# Patient Record
Sex: FEMALE | Race: WHITE | NOT HISPANIC OR LATINO | Employment: OTHER | URBAN - METROPOLITAN AREA
[De-identification: names, ages, dates, MRNs, and addresses within clinical notes are randomized per-mention and may not be internally consistent; named-entity substitution may affect disease eponyms.]

---

## 2017-01-03 ENCOUNTER — TELEPHONE (OUTPATIENT)
Dept: INTERNAL MEDICINE | Facility: CLINIC | Age: 82
End: 2017-01-03

## 2017-01-03 NOTE — TELEPHONE ENCOUNTER
Discussed with her. Recently hospitalized for anemia and non-ST elevation MI.  She will keep her appointment scheduled January 24.  She has cardiology follow-up this week.

## 2017-01-03 NOTE — TELEPHONE ENCOUNTER
----- Message from Epifanio Betancur sent at 1/3/2017  1:05 PM EST -----  Contact: pt  Pt calling would like a call back. Pt has been admitted to [facility]. She says the doctors instructed her to call Dr. Casillas. Please advise.     #[phone]

## 2017-01-05 ENCOUNTER — TELEPHONE (OUTPATIENT)
Dept: CARDIOLOGY | Facility: CLINIC | Age: 82
End: 2017-01-05

## 2017-01-05 ENCOUNTER — LAB (OUTPATIENT)
Dept: LAB | Facility: HOSPITAL | Age: 82
End: 2017-01-05

## 2017-01-05 ENCOUNTER — OFFICE VISIT (OUTPATIENT)
Dept: CARDIOLOGY | Facility: CLINIC | Age: 82
End: 2017-01-05

## 2017-01-05 VITALS
SYSTOLIC BLOOD PRESSURE: 130 MMHG | WEIGHT: 164 LBS | HEART RATE: 74 BPM | HEIGHT: 63 IN | BODY MASS INDEX: 29.06 KG/M2 | DIASTOLIC BLOOD PRESSURE: 72 MMHG

## 2017-01-05 DIAGNOSIS — Z87.19 H/O: GI BLEED: ICD-10-CM

## 2017-01-05 DIAGNOSIS — I21.4 NSTEMI (NON-ST ELEVATED MYOCARDIAL INFARCTION) (HCC): ICD-10-CM

## 2017-01-05 DIAGNOSIS — I10 ESSENTIAL HYPERTENSION: Primary | ICD-10-CM

## 2017-01-05 DIAGNOSIS — D50.0 IRON DEFICIENCY ANEMIA DUE TO CHRONIC BLOOD LOSS: ICD-10-CM

## 2017-01-05 LAB
ANISOCYTOSIS BLD QL: NORMAL
BASOPHILS # BLD AUTO: 0.03 10*3/MM3 (ref 0–0.2)
BASOPHILS NFR BLD AUTO: 0.7 % (ref 0–1.5)
C3 FRG RBC-MCNC: NORMAL
DACRYOCYTES BLD QL SMEAR: NORMAL
DEPRECATED RDW RBC AUTO: 76.8 FL (ref 37–54)
EOSINOPHIL # BLD AUTO: 0.08 10*3/MM3 (ref 0–0.7)
EOSINOPHIL NFR BLD AUTO: 1.8 % (ref 0.3–6.2)
ERYTHROCYTE [DISTWIDTH] IN BLOOD BY AUTOMATED COUNT: 29.2 % (ref 11.7–13)
HCT VFR BLD AUTO: 41.2 % (ref 35.6–45.5)
HGB BLD-MCNC: 12.5 G/DL (ref 11.9–15.5)
IMM GRANULOCYTES # BLD: 0 10*3/MM3 (ref 0–0.03)
IMM GRANULOCYTES NFR BLD: 0 % (ref 0–0.5)
LYMPHOCYTES # BLD AUTO: 0.85 10*3/MM3 (ref 0.9–4.8)
LYMPHOCYTES NFR BLD AUTO: 18.6 % (ref 19.6–45.3)
MCH RBC QN AUTO: 23.3 PG (ref 26.9–32)
MCHC RBC AUTO-ENTMCNC: 30.3 G/DL (ref 32.4–36.3)
MCV RBC AUTO: 76.7 FL (ref 80.5–98.2)
MONOCYTES # BLD AUTO: 0.39 10*3/MM3 (ref 0.2–1.2)
MONOCYTES NFR BLD AUTO: 8.6 % (ref 5–12)
NEUTROPHILS # BLD AUTO: 3.21 10*3/MM3 (ref 1.9–8.1)
NEUTROPHILS NFR BLD AUTO: 70.3 % (ref 42.7–76)
OVALOCYTES BLD QL SMEAR: NORMAL
PLAT MORPH BLD: NORMAL
PLATELET # BLD AUTO: 88 10*3/MM3 (ref 140–500)
POIKILOCYTOSIS BLD QL SMEAR: NORMAL
RBC # BLD AUTO: 5.37 10*6/MM3 (ref 3.9–5.2)
WBC MORPH BLD: NORMAL
WBC NRBC COR # BLD: 4.56 10*3/MM3 (ref 4.5–10.7)

## 2017-01-05 PROCEDURE — 93000 ELECTROCARDIOGRAM COMPLETE: CPT | Performed by: PHYSICIAN ASSISTANT

## 2017-01-05 PROCEDURE — 85025 COMPLETE CBC W/AUTO DIFF WBC: CPT

## 2017-01-05 PROCEDURE — 36415 COLL VENOUS BLD VENIPUNCTURE: CPT

## 2017-01-05 PROCEDURE — 85007 BL SMEAR W/DIFF WBC COUNT: CPT

## 2017-01-05 PROCEDURE — 99214 OFFICE O/P EST MOD 30 MIN: CPT | Performed by: PHYSICIAN ASSISTANT

## 2017-01-05 NOTE — PROGRESS NOTES
Date of Office Visit: 2017  Encounter Provider: MIKE Guerrero  Place of Service: [facility] CARDIOLOGY  Patient Name: Pamela Durham  :1935    Chief Complaint   Patient presents with   • Shortness of Breath     1 week hospital f/u   :     HPI: Pamela Durham is a 81 y.o. female , new to me, who presents today for follow-up.  Old records have been obtained and reviewed by me.  She is a patient with a past medical history significant for cholelithiasis, osteoarthritis, chronic anemia, and hypertension who presented to the emergency room on 2016 with complaints of increased fatigue and dyspnea on exertion ×1 week.  As part of her workup, she was found to have elevated cardiac biomarkers that were consistent with a non-STEMI.  She was also found to have bilateral pleural effusions and microcytic anemia.  Because of her anemia and the fact that she reported bright red blood in her stool, a GI consult was obtained.  Dr. Earl did evaluate the patient for her non-STEMI.  Because there was concern for her bleeding in her intestines, she was not taken to the cardiac Cath Lab.  She ultimately received a lower and upper endoscopy, and was found to have gastric ulcers that were superficial and colonic angiodysplasias that were treated successfully.  For her non-STEMI, she was treated with a beta blocker, aspirin, and a statin.  She was also diuresed, which helped her pleural effusion.  Recommendations were to follow-up with cardiology for consideration of a left heart catheterization in the future when dual antiplatelet therapy would be an option.  She was discharged home in stable condition on 2016.   Since she's been home, she's much improved.  She denies chest pain, shortness of breath, palpitations, edema, dizziness, or syncope.  She is much less fatigued.  She has not had any lab work rechecked since she left the hospital.  She has been weighing herself daily.   On her scale at home, she's lost a total of about 10 pounds.      Past Medical History   Diagnosis Date   • Gallbladder stone without cholecystitis or obstruction      gallstone seen on ultrasound 2007   • Hypertension    • Osteoarthritis        Past Surgical History   Procedure Laterality Date   • Knee surgery Bilateral      2007 & 2008   • Hysterectomy  1986   • Tonsillectomy     • Lung surgery  1965   • Hip arthroplasty Right 04/01/2015   • Endoscopy N/A 12/27/2016     Procedure: ESOPHAGOGASTRODUODENOSCOPY WITH COLD BIOPSIES;  Surgeon: Javier Peñaloza MD;  Location: [facility] ENDOSCOPY;  Service:    • Colonoscopy N/A 12/27/2016     Procedure: COLONOSCOPY INTO CECUM WITH ARGON PLASMA COAGULATION;  Surgeon: Javier Peñaloza MD;  Location: [facility] ENDOSCOPY;  Service:        Social History     Social History   • Marital status:      Spouse name: N/A   • Number of children: N/A   • Years of education: N/A     Occupational History   • Not on file.     Social History Main Topics   • Smoking status: Never Smoker   • Smokeless tobacco: Not on file   • Alcohol use No   • Drug use: Defer   • Sexual activity: Defer     Other Topics Concern   • Not on file     Social History Narrative       Family History   Problem Relation Age of Onset   • Hypertension Other    • Heart disease Other    • Liver cancer Brother    • Ovarian cancer Daughter      diagnosed 2012   • No Known Problems Mother    • Heart disease Father    • Heart attack Father    • Cancer Maternal Grandmother    • No Known Problems Maternal Grandfather    • No Known Problems Paternal Grandmother    • No Known Problems Paternal Grandfather        Review of Systems   Constitution: Negative for chills, fever, malaise/fatigue, weight gain and weight loss.   HENT: Negative for ear pain, headaches, hearing loss, nosebleeds and sore throat.    Eyes: Negative for double vision, pain and visual disturbance.   Cardiovascular: Negative for chest pain, dyspnea on exertion,  irregular heartbeat, leg swelling, near-syncope, orthopnea, palpitations, paroxysmal nocturnal dyspnea and syncope.   Respiratory: Negative for cough, shortness of breath, sleep disturbances due to breathing, snoring and wheezing.    Endocrine: Negative for cold intolerance, heat intolerance and polyuria.   Skin: Negative for itching and rash.   Musculoskeletal: Negative for joint pain, joint swelling and myalgias.   Gastrointestinal: Negative for abdominal pain, diarrhea, melena, nausea and vomiting.   Genitourinary: Negative for frequency, hematuria and hesitancy.   Neurological: Negative for excessive daytime sleepiness, light-headedness, numbness, paresthesias and seizures.   Psychiatric/Behavioral: Negative for altered mental status and depression.   Allergic/Immunologic: Negative.    All other systems reviewed and are negative.      No Known Allergies      Current Outpatient Prescriptions:   •  acetaminophen (TYLENOL) 325 MG tablet, Take 2 tablets by mouth Every 4 (Four) Hours As Needed for mild pain (1-3)., Disp: , Rfl: 0  •  aspirin 81 MG EC tablet, Take 1 tablet by mouth Daily., Disp: 90 tablet, Rfl: 0  •  atorvastatin (LIPITOR) 80 MG tablet, Take 1 tablet by mouth Every Night., Disp: 30 tablet, Rfl: 0  •  BIOTIN PO, Take  by mouth daily., Disp: , Rfl:   •  CALCIUM PO, Take  by mouth 2 (two) times a day., Disp: , Rfl:   •  carvedilol (COREG) 3.125 MG tablet, Take 1 tablet by mouth Every 12 (Twelve) Hours., Disp: 60 tablet, Rfl: 0  •  Cholecalciferol (VITAMIN D-3 PO), Take  by mouth., Disp: , Rfl:   •  docusate sodium 100 MG capsule, Take 100 mg by mouth 2 (Two) Times a Day., Disp: , Rfl: 0  •  felodipine (PLENDIL) 2.5 MG 24 hr tablet, Take  by mouth., Disp: , Rfl:   •  ferrous sulfate 325 (65 FE) MG tablet, Take 1 tablet by mouth 2 (Two) Times a Day With Meals., Disp: 60 tablet, Rfl: 1  •  furosemide (LASIX) 40 MG tablet, Take 1 tablet by mouth Daily., Disp: 30 tablet, Rfl: 0  •  HYDROcodone-acetaminophen  "(NORCO) 7.5-325 MG per tablet, Take 1 tablet by mouth Every 6 (Six) Hours As Needed for moderate pain (4-6)., Disp: 60 tablet, Rfl: 0  •  Multiple Vitamins-Minerals (CENTRUM SILVER) tablet, Take 1 tablet by mouth Daily., Disp: , Rfl:   •  pantoprazole (PROTONIX) 40 MG EC tablet, Take 1 tablet by mouth Daily., Disp: 90 tablet, Rfl: 0  •  potassium chloride (K-DUR,KLOR-CON) 10 MEQ CR tablet, Take 1 tablet by mouth Daily., Disp: 30 tablet, Rfl: 0  •  PROAIR  (90 BASE) MCG/ACT inhaler, INHALE 2 PUFFS QID, Disp: , Rfl: 0  •  QVAR 80 MCG/ACT inhaler, INHALE 2 PUFFS BID, Disp: , Rfl: 0     Objective:     Vitals:    01/05/17 0922 01/05/17 0927   BP: 134/84 130/72   BP Location: Right arm Left arm   Pulse: 74    Weight: 164 lb (74.4 kg)    Height: 63\" (160 cm)      Body mass index is 29.05 kg/(m^2).    PHYSICAL EXAM:    Physical Exam   Constitutional: She is oriented to person, place, and time. She appears well-developed and well-nourished. No distress.   HENT:   Head: Normocephalic and atraumatic.   Eyes: Pupils are equal, round, and reactive to light.   Neck: No JVD present. No thyromegaly present.   Cardiovascular: Normal rate, regular rhythm, normal heart sounds and intact distal pulses.    No murmur heard.  Pulmonary/Chest: Effort normal and breath sounds normal. No respiratory distress.   Abdominal: Soft. Bowel sounds are normal. She exhibits no distension. There is no splenomegaly or hepatomegaly. There is no tenderness.   Musculoskeletal: Normal range of motion. She exhibits no edema.   Neurological: She is alert and oriented to person, place, and time.   Skin: Skin is warm and dry. She is not diaphoretic. No erythema.   Psychiatric: She has a normal mood and affect. Her behavior is normal. Judgment normal.         ECG 12 Lead  Date/Time: 1/5/2017 9:39 AM  Performed by: KACIE SEGAL.  Authorized by: KACIE SEGAL   Comparison: compared with previous ECG from 12/25/2016  Similar to previous ECG  Rhythm: " sinus rhythm  BPM: 74  T flattening: V1, V3, V4, V5, V6 and aVL  Clinical impression: abnormal ECG  Comments: Indication: Non-STEMI.              Assessment:       Diagnosis Plan   1. Essential hypertension     2. NSTEMI (non-ST elevated myocardial infarction)  ECG 12 Lead   3. Iron deficiency anemia due to chronic blood loss  CBC & Differential   4. H/O: GI bleed  CBC & Differential     Orders Placed This Encounter   Procedures   • ECG 12 Lead     This order was created via procedure documentation          Plan:       1.  Hypertension.  Her blood pressure is well-controlled today at 130/72.  Continue current medical regimen.    2.  Non-STEMI.  She has had no chest pain.  She was less fatigued and feeling better.  She denies any shortness of breath.  I did discuss this with Dr. Earl.  I'm going to have the patient follow back up with him in 3 weeks.  At that time, discussion will be had about whether or not to take the patient for a left heart catheterization.  She currently has no symptoms of angina or heart failure.    3.  Iron deficiency anemia/history of GI bleed.  I think that she is improved from the standpoint as well.  She will follow-up with Dr. Peñaloza in 3 months.  I'm going to check a CBC today to assess her anemia.  Further recommendations will be made pending the results of that test.    She will follow-up with Dr. Earl in 3 weeks.    As always, it has been a pleasure to participate in your patient's care.      Sincerely,         Amelia Perez PA-C

## 2017-01-05 NOTE — TELEPHONE ENCOUNTER
The patient  called and would like to know if you can please call him back with the patient RBC results. He can be reached at [phone]        Thanks  Louis

## 2017-01-05 NOTE — MR AVS SNAPSHOT
Pamela Durham   1/5/2017 8:40 AM   Office Visit    Dept Phone:  [phone]   Encounter #:  [id number]    Provider:  MIKE Guerrero   Department:  [facility] CARDIOLOGY                Your Full Care Plan              Your Updated Medication List          This list is accurate as of: 1/5/17  9:55 AM.  Always use your most recent med list.                acetaminophen 325 MG tablet   Commonly known as:  TYLENOL   Take 2 tablets by mouth Every 4 (Four) Hours As Needed for mild pain (1-3).       aspirin 81 MG EC tablet   Take 1 tablet by mouth Daily.       atorvastatin 80 MG tablet   Commonly known as:  LIPITOR   Take 1 tablet by mouth Every Night.       BIOTIN PO       CALCIUM PO       carvedilol 3.125 MG tablet   Commonly known as:  COREG   Take 1 tablet by mouth Every 12 (Twelve) Hours.       CENTRUM SILVER tablet        MG capsule   Take 100 mg by mouth 2 (Two) Times a Day.       felodipine 2.5 MG 24 hr tablet   Commonly known as:  PLENDIL       ferrous sulfate 325 (65 FE) MG tablet   Take 1 tablet by mouth 2 (Two) Times a Day With Meals.       furosemide 40 MG tablet   Commonly known as:  LASIX   Take 1 tablet by mouth Daily.       HYDROcodone-acetaminophen 7.5-325 MG per tablet   Commonly known as:  NORCO   Take 1 tablet by mouth Every 6 (Six) Hours As Needed for moderate pain (4-6).       pantoprazole 40 MG EC tablet   Commonly known as:  PROTONIX   Take 1 tablet by mouth Daily.       potassium chloride 10 MEQ CR tablet   Commonly known as:  K-DUR,KLOR-CON   Take 1 tablet by mouth Daily.       PROAIR  (90 BASE) MCG/ACT inhaler   Generic drug:  albuterol       QVAR 80 MCG/ACT inhaler   Generic drug:  beclomethasone       VITAMIN D-3 PO               We Performed the Following     ECG 12 Lead       You Were Diagnosed With        Codes Comments    Essential hypertension    -  Primary ICD-10-CM: I10  ICD-9-CM: 401.9     NSTEMI (non-ST elevated  myocardial infarction)     ICD-10-CM: I21.4  ICD-9-CM: 410.70     Iron deficiency anemia due to chronic blood loss     ICD-10-CM: D50.0  ICD-9-CM: 280.0     H/O: GI bleed     ICD-10-CM: Z87.19  ICD-9-CM: V12.79       Instructions     None    Patient Instructions History      Upcoming Appointments     Visit Type Date Time Department    HOSPITAL FOLLOW UP 1/5/2017  8:40 AM [facility]    FOLLOW UP 1/24/2017  8:45 AM [facility]    FOLLOW UP 1/26/2017  9:40 AM [facility]      MyChart Signup     Our records indicate that you have an active [email or web address] account.    You can view your After Visit Summary by going to [employer] and logging in with your [employer] username and password.  If you don't have a [employer] username and password but a parent or guardian has access to your record, the parent or guardian should login with their own [employer] username and password and access your record to view the After Visit Summary.    If you have questions, you can email [email or web address] or call [phone] to talk to our [employer] staff.  Remember, [employer] is NOT to be used for urgent needs.  For medical emergencies, dial 911.               Other Info from Your Visit           Your Appointments     Jan 24, 2017  8:45 AM EST   Follow Up with Crista Casillas MD   [facility] INTERNAL MEDICINE (--)    [address]  [facility] [address] [phone]           Arrive 15 minutes prior to appointment.            Jan 26, 2017  9:40 AM EST   Follow Up with Desean Earl MD   [facility] CARDIOLOGY (--)    [address]  [facility] [address] [phone]           Arrive 15 minutes prior to appointment.              Allergies     No Known Allergies      Reason for Visit     Shortness of Breath 1 week hospital f/u      Vital Signs     Blood Pressure Pulse Height Weight Body Mass Index Smoking Status    130/72 (BP  "Location: Left arm) 74 63\" (160 cm) 164 lb (74.4 kg) 29.05 kg/m2 Never Smoker      Problems and Diagnoses Noted     High blood pressure    Iron deficiency anemia    Heart attack    H/O: GI bleed          Results     ECG 12 Lead               "

## 2017-01-05 NOTE — TELEPHONE ENCOUNTER
I called the patient and informed her of the results of her CBC.  Her hemoglobin and hematocrit are now within normal limits.  She indicated that she understood.

## 2017-01-18 RX ORDER — FELODIPINE 2.5 MG/1
2.5 TABLET, EXTENDED RELEASE ORAL DAILY
Qty: 90 TABLET | Refills: 1 | Status: SHIPPED | OUTPATIENT
Start: 2017-01-18 | End: 2017-02-28

## 2017-01-24 ENCOUNTER — OFFICE VISIT (OUTPATIENT)
Dept: INTERNAL MEDICINE | Facility: CLINIC | Age: 82
End: 2017-01-24

## 2017-01-24 VITALS
WEIGHT: 155 LBS | DIASTOLIC BLOOD PRESSURE: 72 MMHG | BODY MASS INDEX: 27.46 KG/M2 | SYSTOLIC BLOOD PRESSURE: 120 MMHG | HEIGHT: 63 IN

## 2017-01-24 DIAGNOSIS — I10 ESSENTIAL HYPERTENSION: Primary | ICD-10-CM

## 2017-01-24 DIAGNOSIS — D69.6 THROMBOCYTOPENIA (HCC): ICD-10-CM

## 2017-01-24 DIAGNOSIS — E78.5 HYPERLIPIDEMIA, UNSPECIFIED HYPERLIPIDEMIA TYPE: ICD-10-CM

## 2017-01-24 DIAGNOSIS — R53.1 GENERAL WEAKNESS: ICD-10-CM

## 2017-01-24 LAB
ALBUMIN SERPL-MCNC: 3.99 G/DL (ref 3.4–4.6)
ALBUMIN/GLOB SERPL: 1.2 G/DL
ALP SERPL-CCNC: 126 U/L (ref 46–116)
ALT SERPL W P-5'-P-CCNC: 44 U/L (ref 14–59)
ANION GAP SERPL CALCULATED.3IONS-SCNC: 8 MMOL/L
AST SERPL-CCNC: 53 U/L (ref 7–37)
BASOPHILS # BLD AUTO: 0.04 10*3/MM3 (ref 0–0.2)
BASOPHILS NFR BLD AUTO: 0.8 % (ref 0–1.5)
BILIRUB SERPL-MCNC: 0.9 MG/DL (ref 0.2–1)
BUN BLD-MCNC: 16 MG/DL (ref 6–22)
BUN/CREAT SERPL: 12 (ref 7–25)
CALCIUM SPEC-SCNC: 9.7 MG/DL (ref 8.6–10.5)
CHLORIDE SERPL-SCNC: 104 MMOL/L (ref 95–107)
CHOLEST SERPL-MCNC: 125 MG/DL (ref 0–200)
CO2 SERPL-SCNC: 30 MMOL/L (ref 23–32)
CREAT BLD-MCNC: 1.33 MG/DL (ref 0.55–1.02)
DIFFERENTIAL COMMENT: NORMAL
EOSINOPHIL # BLD AUTO: 0.22 10*3/MM3 (ref 0–0.7)
EOSINOPHIL # BLD AUTO: 4.6 % (ref 0.3–6.2)
GFR SERPL CREATININE-BSD FRML MDRD: 38 ML/MIN/1.73
GLOBULIN UR ELPH-MCNC: 3.2 GM/DL
GLUCOSE BLD-MCNC: 154 MG/DL (ref 70–100)
HCT VFR BLD AUTO: 45.2 % (ref 35.6–45.5)
HDLC SERPL-MCNC: 42 MG/DL (ref 40–81)
HGB BLD-MCNC: 13.9 G/DL (ref 11.9–15.5)
IMM GRANULOCYTES # BLD: 0 10*3/MM3 (ref 0–0.03)
IMM GRANULOCYTES NFR BLD: 0 % (ref 0–0.5)
LABORATORY COMMENT REPORT: NORMAL
LDLC SERPL CALC-MCNC: 60 MG/DL (ref 0–100)
LDLC/HDLC SERPL: 1.42 {RATIO}
LYMPHOCYTES # BLD AUTO: 1.19 10*3/MM3 (ref 0.9–4.8)
LYMPHOCYTES NFR BLD AUTO: 25.1 % (ref 19.6–45.3)
MCH RBC QN AUTO: 24.6 PG (ref 26.9–32)
MCHC RBC AUTO-ENTMCNC: 30.8 G/DL (ref 32.4–36.3)
MCV RBC AUTO: 80.1 FL (ref 80.5–98.2)
MONOCYTES # BLD AUTO: 0.27 10*3/MM3 (ref 0.2–1.2)
MONOCYTES NFR BLD AUTO: 5.7 % (ref 5–12)
NEUTROPHILS # BLD AUTO: 3.02 10*3/MM3 (ref 1.9–8.1)
NEUTROPHILS NFR BLD AUTO: 63.8 % (ref 42.7–76)
NRBC BLD AUTO-RTO: 0 /100 WBC (ref 0–0)
PLATELET # BLD AUTO: 80 10*3/MM3 (ref 140–500)
PLT COMMENT: NORMAL
POTASSIUM BLD-SCNC: 4.7 MMOL/L (ref 3.3–5.3)
PROT SERPL-MCNC: 7.2 G/DL (ref 6.3–8.4)
RBC # BLD AUTO: 5.64 10*6/MM3 (ref 3.9–5.2)
SODIUM BLD-SCNC: 142 MMOL/L (ref 136–145)
TRIGL SERPL-MCNC: 116 MG/DL (ref 0–150)
VLDLC SERPL-MCNC: 23.2 MG/DL
WBC # BLD AUTO: 4.74 10*3/MM3 (ref 4.5–10.7)

## 2017-01-24 PROCEDURE — 99214 OFFICE O/P EST MOD 30 MIN: CPT | Performed by: INTERNAL MEDICINE

## 2017-01-24 PROCEDURE — 80061 LIPID PANEL: CPT | Performed by: INTERNAL MEDICINE

## 2017-01-24 PROCEDURE — 80053 COMPREHEN METABOLIC PANEL: CPT | Performed by: INTERNAL MEDICINE

## 2017-01-24 NOTE — MR AVS SNAPSHOT
Pamela Durham   1/24/2017 8:45 AM   Office Visit    Dept Phone:  [phone]   Encounter #:  [id number]    Provider:  Crista Casillas MD   Department:  [facility] INTERNAL MEDICINE                Your Full Care Plan              Your Updated Medication List          This list is accurate as of: 1/24/17  9:30 AM.  Always use your most recent med list.                acetaminophen 325 MG tablet   Commonly known as:  TYLENOL   Take 2 tablets by mouth Every 4 (Four) Hours As Needed for mild pain (1-3).       aspirin 81 MG EC tablet   Take 1 tablet by mouth Daily.       atorvastatin 80 MG tablet   Commonly known as:  LIPITOR   Take 1 tablet by mouth Every Night.       BIOTIN PO       CALCIUM PO       carvedilol 3.125 MG tablet   Commonly known as:  COREG   Take 1 tablet by mouth Every 12 (Twelve) Hours.       CENTRUM SILVER tablet        MG capsule   Take 100 mg by mouth 2 (Two) Times a Day.       felodipine 2.5 MG 24 hr tablet   Commonly known as:  PLENDIL   Take 1 tablet by mouth Daily.       ferrous sulfate 325 (65 FE) MG tablet   Take 1 tablet by mouth 2 (Two) Times a Day With Meals.       furosemide 40 MG tablet   Commonly known as:  LASIX   Take 1 tablet by mouth Daily.       HYDROcodone-acetaminophen 7.5-325 MG per tablet   Commonly known as:  NORCO   Take 1 tablet by mouth Every 6 (Six) Hours As Needed for moderate pain (4-6).       pantoprazole 40 MG EC tablet   Commonly known as:  PROTONIX   Take 1 tablet by mouth Daily.       potassium chloride 10 MEQ CR tablet   Commonly known as:  K-DUR,KLOR-CON   Take 1 tablet by mouth Daily.       PROAIR  (90 BASE) MCG/ACT inhaler   Generic drug:  albuterol       QVAR 80 MCG/ACT inhaler   Generic drug:  beclomethasone       VITAMIN D-3 PO               We Performed the Following     Ambulatory Referral to Physical Therapy Evaluate and treat       You Were Diagnosed With        Codes Comments    Essential hypertension     -  Primary ICD-10-CM: I10  ICD-9-CM: 401.9     Hyperlipidemia, unspecified hyperlipidemia type     ICD-10-CM: E78.5  ICD-9-CM: 272.4     General weakness     ICD-10-CM: R53.1  ICD-9-CM: 780.79       Instructions     None    Patient Instructions History      Upcoming Appointments     Visit Type Date Time Department    FOLLOW UP 1/24/2017  8:45 AM [facility]    FOLLOW UP 1/26/2017  9:40 AM [facility]    FOLLOW UP 5/31/2017  8:00 AM [facility] Signup     Our records indicate that you have an active [employer] account.    You can view your After Visit Summary by going to [employer] and logging in with your [email or web address] username and password.  If you don't have a [email or web address] username and password but a parent or guardian has access to your record, the parent or guardian should login with their own [email or web address] username and password and access your record to view the After Visit Summary.    If you have questions, you can email [email or web address] or call [phone] to talk to our [email or web address] staff.  Remember, [email or web address] is NOT to be used for urgent needs.  For medical emergencies, dial 911.               Other Info from Your Visit           Your Appointments     Jan 26, 2017  9:40 AM EST   Follow Up with Desean Earl MD   [facility] CARDIOLOGY (--)    [address]  [facility] [address] [phone]           Arrive 15 minutes prior to appointment.            May 31, 2017  8:00 AM EDT   Follow Up with Crista Casillas MD   [facility] INTERNAL MEDICINE (--)    [address]  [facility] [address] [phone]           Arrive 15 minutes prior to appointment.              Allergies     No Known Allergies      Reason for Visit     Hypertension     Hyperlipidemia           Vital Signs     Blood Pressure Height Weight Body Mass Index Smoking Status       120/72 (BP Location: Left arm, Patient  "Position: Sitting, Cuff Size: Adult) 63\" (160 cm) 155 lb (70.3 kg) 27.46 kg/m2 Never Smoker       Problems and Diagnoses Noted     High blood pressure    General weakness    High cholesterol or triglycerides        "

## 2017-01-24 NOTE — PROGRESS NOTES
Chief Complaint   Patient presents with   • Hypertension   • Hyperlipidemia        Subjective   Pamela Durham is a 81 y.o. female     Hypertension   This is a chronic problem. The problem is controlled. Pertinent negatives include no chest pain, palpitations or shortness of breath. There are no associated agents to hypertension. Risk factors for coronary artery disease include dyslipidemia. Past treatments include calcium channel blockers and beta blockers (Current treatment). The current treatment provides significant improvement. There are no compliance problems.  Hypertensive end-organ damage includes CAD/MI.   Hyperlipidemia   This is a chronic problem. The problem is controlled. Recent lipid tests were reviewed and are normal. Factors aggravating her hyperlipidemia include beta blockers. Pertinent negatives include no chest pain, leg pain, myalgias or shortness of breath. Current antihyperlipidemic treatment includes statins. The current treatment provides significant improvement of lipids. There are no compliance problems.    :     Elevated fasting glucose:  She does not have history of diabetes.  However, she has had elevated fasting glucose in the past.  She denies polyuria, polydipsia, vision change appetite change, unexplained weight loss.   Lab Results   Component Value Date    HGBA1C 5.40 12/25/2016      She was recently hospitalized for weakness.  Found to have profound anemia, GI bleed and also had NSTEMI.  She is now home.  She still feels somewhat weak.  She walks with a cane.  Her appetite is fair.  Energy is fair.  She has had follow-up with cardiology and will see cardiology again this week.  She'll see the gastroenterologist in 3 months.  Lab Results   Component Value Date    WBC 4.56 01/05/2017    HGB 12.5 01/05/2017    HCT 41.2 01/05/2017    MCV 76.7 (L) 01/05/2017    PLT 88 (L) 01/05/2017          The following portions of the patient's history were reviewed and updated as appropriate:  "allergies, current medications, past social history, problem list, past surgical history    Review of Systems   Constitutional: Positive for appetite change (appetite is fair). Negative for fatigue.   Respiratory: Negative for cough and shortness of breath.    Cardiovascular: Negative for chest pain, palpitations and leg swelling.   Gastrointestinal: Negative for abdominal pain, constipation, diarrhea, nausea and vomiting.   Endocrine: Negative for cold intolerance and heat intolerance.   Musculoskeletal: Positive for back pain. Negative for myalgias.   Psychiatric/Behavioral: Negative for sleep disturbance.       Visit Vitals   • /72 (BP Location: Left arm, Patient Position: Sitting, Cuff Size: Adult)   • Ht 63\" (160 cm)   • Wt 155 lb (70.3 kg)   • BMI 27.46 kg/m2        Objective   Physical Exam   Constitutional: She is oriented to person, place, and time. She appears well-developed and well-nourished. No distress.   Neck: Normal carotid pulses present. Carotid bruit is not present.   Cardiovascular: Normal rate, regular rhythm, S1 normal, S2 normal and intact distal pulses.  Exam reveals no gallop and no friction rub.    No murmur heard.  Pulses:       Carotid pulses are 2+ on the right side, and 2+ on the left side.  Pulmonary/Chest: Effort normal and breath sounds normal. No respiratory distress. She has no wheezes. She has no rales. Chest wall is not dull to percussion.   Musculoskeletal: She exhibits no edema.   Neurological: She is alert and oriented to person, place, and time.   Skin: Skin is warm and dry.   Nursing note and vitals reviewed.      Assessment/Plan   Problem List Items Addressed This Visit        Cardiovascular and Mediastinum    Essential hypertension - Primary    Relevant Orders    Comprehensive Metabolic Panel    Lipid Panel    CBC & Differential    Hyperlipemia       Other    General weakness    Relevant Orders    Ambulatory Referral to Physical Therapy Evaluate and treat (Completed) "      Other Visit Diagnoses     Thrombocytopenia        Relevant Orders    CBC & Differential        CBC done earlier this month shows improving hemoglobin but low platelet count.

## 2017-01-26 ENCOUNTER — OFFICE VISIT (OUTPATIENT)
Dept: CARDIOLOGY | Facility: CLINIC | Age: 82
End: 2017-01-26

## 2017-01-26 VITALS
HEIGHT: 63 IN | SYSTOLIC BLOOD PRESSURE: 122 MMHG | WEIGHT: 157 LBS | HEART RATE: 63 BPM | DIASTOLIC BLOOD PRESSURE: 60 MMHG | BODY MASS INDEX: 27.82 KG/M2

## 2017-01-26 DIAGNOSIS — I21.4 NSTEMI (NON-ST ELEVATED MYOCARDIAL INFARCTION) (HCC): ICD-10-CM

## 2017-01-26 DIAGNOSIS — I10 ESSENTIAL HYPERTENSION: ICD-10-CM

## 2017-01-26 PROCEDURE — 99214 OFFICE O/P EST MOD 30 MIN: CPT | Performed by: INTERNAL MEDICINE

## 2017-01-26 PROCEDURE — 93000 ELECTROCARDIOGRAM COMPLETE: CPT | Performed by: INTERNAL MEDICINE

## 2017-01-26 NOTE — MR AVS SNAPSHOT
Pamela Durham   1/26/2017 9:40 AM   Office Visit    Dept Phone:  [phone]   Encounter #:  [id number]    Provider:  Desean Earl MD   Department:  [facility] CARDIOLOGY                Your Full Care Plan              Your Updated Medication List          This list is accurate as of: 1/26/17 10:22 AM.  Always use your most recent med list.                acetaminophen 325 MG tablet   Commonly known as:  TYLENOL   Take 2 tablets by mouth Every 4 (Four) Hours As Needed for mild pain (1-3).       aspirin 81 MG EC tablet   Take 1 tablet by mouth Daily.       atorvastatin 80 MG tablet   Commonly known as:  LIPITOR   Take 1 tablet by mouth Every Night.       BIOTIN PO       CALCIUM PO       carvedilol 3.125 MG tablet   Commonly known as:  COREG   Take 1 tablet by mouth Every 12 (Twelve) Hours.       CENTRUM SILVER tablet        MG capsule   Take 100 mg by mouth 2 (Two) Times a Day.       felodipine 2.5 MG 24 hr tablet   Commonly known as:  PLENDIL   Take 1 tablet by mouth Daily.       ferrous sulfate 325 (65 FE) MG tablet   Take 1 tablet by mouth 2 (Two) Times a Day With Meals.       furosemide 40 MG tablet   Commonly known as:  LASIX   Take 1 tablet by mouth Daily.       HYDROcodone-acetaminophen 7.5-325 MG per tablet   Commonly known as:  NORCO   Take 1 tablet by mouth Every 6 (Six) Hours As Needed for moderate pain (4-6).       pantoprazole 40 MG EC tablet   Commonly known as:  PROTONIX   Take 1 tablet by mouth Daily.       potassium chloride 10 MEQ CR tablet   Commonly known as:  K-DUR,KLOR-CON   Take 1 tablet by mouth Daily.       PROAIR  (90 BASE) MCG/ACT inhaler   Generic drug:  albuterol       QVAR 80 MCG/ACT inhaler   Generic drug:  beclomethasone       VITAMIN D-3 PO               We Performed the Following     ECG 12 Lead     Stress Test With Myocardial Perfusion One Day       You Were Diagnosed With        Codes Comments    NSTEMI  (non-ST elevated myocardial infarction)     ICD-10-CM: I21.4  ICD-9-CM: 410.70     Essential hypertension     ICD-10-CM: I10  ICD-9-CM: 401.9       Instructions     None    Patient Instructions History      Upcoming Appointments     Visit Type Date Time Department    FOLLOW UP 1/26/2017  9:40 AM [facility] STRESS [place] 2/6/2017  9:00 AM  [facility] NUC CARD    FOLLOW UP 5/31/2017  8:00 AM [facility]    FOLLOW UP 7/24/2017 10:20 AM [facility] [place]      MyChart Signup     Our records indicate that you have an active [employer] account.    You can view your After Visit Summary by going to [employer] and logging in with your [facility] username and password.  If you don't have a [employer] username and password but a parent or guardian has access to your record, the parent or guardian should login with their own [employer] username and password and access your record to view the After Visit Summary.    If you have questions, you can email [email or web address] or call [phone] to talk to our [employer] staff.  Remember, [employer] is NOT to be used for urgent needs.  For medical emergencies, dial 911.               Other Info from Your Visit           Your Appointments     Feb 06, 2017  9:00 AM EST    [facility] STRESS [place] with [facility]   [facility]    [address]  [facility] [address] [phone]            May 31, 2017  8:00 AM EDT   Follow Up with Crista Casillas MD   [facility] INTERNAL MEDICINE (--)    [address]  [facility] [address] [phone]           Arrive 15 minutes prior to appointment.            Jul 24, 2017 10:20 AM EDT   Follow Up with Desean Earl MD   [facility] CARDIOLOGY (--)    [address]  [facility] [address] [phone]           Arrive 15 minutes prior to appointment.             "  Allergies     No Known Allergies      Reason for Visit     Follow-up from [facility]    NSTEMI     Hypertension           Vital Signs     Blood Pressure Pulse Height Weight Body Mass Index Smoking Status    122/60 63 63\" (160 cm) 157 lb (71.2 kg) 27.81 kg/m2 Never Smoker      Problems and Diagnoses Noted     High blood pressure    Heart attack      Results         "

## 2017-01-26 NOTE — PROGRESS NOTES
Pamela Durham  1935  Date of Office Visit: 01/26/2017  Encounter Provider: Desean Earl MD  Place of Service: [facility] CARDIOLOGY      CHIEF COMPLAINT:   1. Non-ST elevation myocardial infarction.    2. Iron deficiency anemia.   3. Inferior wall motion abnormality on echocardiogram.    4. Mild-to-moderate mitral regurgitation.         HISTORY OF PRESENT ILLNESS: Dr. Casillas,     I had the pleasure of seeing your patient Pamela Durham in followup after her recent hospitalization with iron deficiency anemia, non-ST elevation myocardial infarction, small bilateral pleural effusions, and the finding on GI evaluation of multiple colonic angiodysplastic lesions treated with argon plasma coagulation.  The patient denied any chest pain at that time.  Secondary to her non-ST elevation myocardial infarction, she did undergo a thoracic echocardiogram which showed her to have a normal ejection fraction but an inferior wall motion defect consistent with mild hypokinesis.  She had mild-to-moderate mitral regurgitation, mild tricuspid regurgitation and a right ventricular systolic pressure of 43 mmHg.      Secondary to her anemia and need for transfusion, she did not undergo an ischemic evaluation at that time as dual antiplatelet therapy would be contraindicated.  In addition, she was asymptomatic from an angina standpoint.      Since receiving blood, she has not noticed any additional bright red blood per stools or melena.  She feels great.  She has no angina or dyspnea with minimal levels of exertion.  No orthopnea, PND or lower extremity edema.        Review of Systems   Constitution: Negative for fever, weakness and malaise/fatigue.   HENT: Negative for nosebleeds and sore throat.    Eyes: Negative for blurred vision and double vision.   Cardiovascular: Negative for chest pain, claudication, palpitations and syncope.   Respiratory: Negative for cough, shortness of breath and  snoring.    Endocrine: Negative for cold intolerance, heat intolerance and polydipsia.   Skin: Negative for itching, poor wound healing and rash.   Musculoskeletal: Negative for joint pain, joint swelling, muscle weakness and myalgias.   Gastrointestinal: Negative for abdominal pain, melena, nausea and vomiting.   Neurological: Negative for light-headedness, loss of balance, seizures and vertigo.   Psychiatric/Behavioral: Negative for altered mental status and depression.          Past Medical History   Diagnosis Date   • Gallbladder stone without cholecystitis or obstruction      gallstone seen on ultrasound 2007   • Health care maintenance    • Hypertension    • Lumbar canal stenosis    • Lumbar radiculopathy    • Osteoarthritis        The following portions of the patient's history were reviewed and updated as appropriate: Social history , Family history and Surgical history     Current Outpatient Prescriptions on File Prior to Visit   Medication Sig Dispense Refill   • acetaminophen (TYLENOL) 325 MG tablet Take 2 tablets by mouth Every 4 (Four) Hours As Needed for mild pain (1-3).  0   • aspirin 81 MG EC tablet Take 1 tablet by mouth Daily. 90 tablet 0   • atorvastatin (LIPITOR) 80 MG tablet Take 1 tablet by mouth Every Night. 30 tablet 0   • BIOTIN PO Take  by mouth daily.     • CALCIUM PO Take  by mouth 2 (two) times a day.     • carvedilol (COREG) 3.125 MG tablet Take 1 tablet by mouth Every 12 (Twelve) Hours. 60 tablet 0   • Cholecalciferol (VITAMIN D-3 PO) Take  by mouth.     • docusate sodium 100 MG capsule Take 100 mg by mouth 2 (Two) Times a Day.  0   • felodipine (PLENDIL) 2.5 MG 24 hr tablet Take 1 tablet by mouth Daily. 90 tablet 1   • ferrous sulfate 325 (65 FE) MG tablet Take 1 tablet by mouth 2 (Two) Times a Day With Meals. 60 tablet 1   • furosemide (LASIX) 40 MG tablet Take 1 tablet by mouth Daily. 30 tablet 0   • HYDROcodone-acetaminophen (NORCO) 7.5-325 MG per tablet Take 1 tablet by mouth  "Every 6 (Six) Hours As Needed for moderate pain (4-6). 60 tablet 0   • Multiple Vitamins-Minerals (CENTRUM SILVER) tablet Take 1 tablet by mouth Daily.     • pantoprazole (PROTONIX) 40 MG EC tablet Take 1 tablet by mouth Daily. 90 tablet 0   • potassium chloride (K-DUR,KLOR-CON) 10 MEQ CR tablet Take 1 tablet by mouth Daily. 30 tablet 0   • PROAIR  (90 BASE) MCG/ACT inhaler INHALE 2 PUFFS QID  0   • QVAR 80 MCG/ACT inhaler INHALE 2 PUFFS BID  0     No current facility-administered medications on file prior to visit.        No Known Allergies    Vitals:    01/26/17 0936   BP: 122/60   Pulse: 63   Weight: 157 lb (71.2 kg)   Height: 63\" (160 cm)     Physical Exam   Constitutional: She is oriented to person, place, and time. She appears well-developed and well-nourished.   HENT:   Head: Normocephalic and atraumatic.   Eyes: Conjunctivae and EOM are normal. No scleral icterus.   Neck: Normal range of motion. Neck supple. Normal carotid pulses, no hepatojugular reflux and no JVD present. Carotid bruit is not present. No tracheal deviation present. No thyromegaly present.   Cardiovascular: Normal rate and regular rhythm.  Exam reveals no gallop and no friction rub.    No murmur heard.  Pulses:       Carotid pulses are 2+ on the right side, and 2+ on the left side.       Radial pulses are 2+ on the right side, and 2+ on the left side.        Femoral pulses are 2+ on the right side, and 2+ on the left side.       Dorsalis pedis pulses are 2+ on the right side, and 2+ on the left side.        Posterior tibial pulses are 2+ on the right side, and 2+ on the left side.   Pulmonary/Chest: Breath sounds normal. No respiratory distress. She has no decreased breath sounds. She has no wheezes. She has no rhonchi. She has no rales. She exhibits no tenderness.   Abdominal: Soft. Bowel sounds are normal. She exhibits no distension. There is no tenderness. There is no rebound.   Musculoskeletal: She exhibits no edema or deformity. "   Neurological: She is alert and oriented to person, place, and time. She has normal strength. No sensory deficit.   Skin: No rash noted. No erythema.   Psychiatric: She has a normal mood and affect. Her behavior is normal.           No components found for: CBC  No results found for: CMP  No components found for: LIPID  No results found for: BMP      ECG 12 Lead  Date/Time: 1/26/2017 10:12 AM  Performed by: ERWIN SWEENEY  Authorized by: ERWIN SWEENEY   Comparison: compared with previous ECG from 1/5/2017  Similar to previous ECG  Rhythm: sinus rhythm  Rate: normal  ST Segments: ST segments normal  QRS axis: normal  Comments: nonspecific T-wave abnormalities anterior leads           DISCUSSION/SUMMARY She is a very pleasant 81-year-old female with a medical history as documented above including non-ST elevation myocardial infarction in the setting of acute blood loss anemia and colonic angiodysplastic lesions requiring argon laser therapy, wall motion abnormality of the inferior wall with a preserved ejection fraction, mitral and tricuspid regurgitation who presents back for followup.  She has been doing very well since her discharge and denies any symptoms consistent with heart failure or angina.  She has minimal levels of exertion, but has no symptoms with that.      1. Non-ST elevation myocardial infarction; she did have this in the setting of significant anemia requiring blood transfusion.  This is likely a type 2 myocardial infarction.  Supply-demand mismatch.  However, she did have an inferior wall motion abnormality and she certainly is likely to have some degree of coronary artery disease.  In the setting of her minimal levels of exertion and wall motion abnormality, I do think that starting out with a stress test to ensure that she does not have a large ischemic defect would be the first route.  The reasoning behind this would be that we need to have a high threshold to put her on dual  antiplatelet therapy and if she is asymptomatic we need to at least confirm that she has a large amount of ischemia and that our intervention would be warranted.    2. Essential hypertension; at goal.  Continue current therapy.     I will plan to see the patient back in the office in six months or earlier with problems.  We will discuss the stress test findings prior to her followup visit.

## 2017-01-27 ENCOUNTER — DOCUMENTATION (OUTPATIENT)
Dept: INTERNAL MEDICINE | Facility: CLINIC | Age: 82
End: 2017-01-27

## 2017-01-27 ENCOUNTER — TELEPHONE (OUTPATIENT)
Dept: INTERNAL MEDICINE | Facility: CLINIC | Age: 82
End: 2017-01-27

## 2017-01-27 DIAGNOSIS — D69.6 THROMBOCYTOPENIA (HCC): Primary | ICD-10-CM

## 2017-01-27 RX ORDER — FUROSEMIDE 40 MG/1
40 TABLET ORAL DAILY
Qty: 90 TABLET | Refills: 3 | Status: SHIPPED | OUTPATIENT
Start: 2017-01-27 | End: 2017-02-28

## 2017-01-27 RX ORDER — POTASSIUM CHLORIDE 750 MG/1
10 TABLET, EXTENDED RELEASE ORAL DAILY
Qty: 90 TABLET | Refills: 3 | Status: SHIPPED | OUTPATIENT
Start: 2017-01-27 | End: 2017-02-28

## 2017-01-27 RX ORDER — ATORVASTATIN CALCIUM 80 MG/1
80 TABLET, FILM COATED ORAL NIGHTLY
Qty: 90 TABLET | Refills: 3 | Status: SHIPPED | OUTPATIENT
Start: 2017-01-27 | End: 2017-02-28

## 2017-01-27 RX ORDER — CARVEDILOL 3.12 MG/1
3.12 TABLET ORAL EVERY 12 HOURS SCHEDULED
Qty: 180 TABLET | Refills: 3 | Status: SHIPPED | OUTPATIENT
Start: 2017-01-27 | End: 2017-02-28

## 2017-01-27 NOTE — TELEPHONE ENCOUNTER
She would like to come in on 2/6/2017 for her blood count.  I already have an order in the chart for blood count.  I think that should be fine for 6 February.

## 2017-01-27 NOTE — PROGRESS NOTES
I spoke to her about her platelet count.  Advised her to stop aspirin.  We will recheck the blood count next week.

## 2017-01-27 NOTE — TELEPHONE ENCOUNTER
"----- Message from Loan Quesada sent at 1/27/2017  1:11 PM EST -----  Contact: Patient  Patient called.  She states she talked to Dr. Casillas today, and now has the \"information.\"  Wants to speak with Dr. Casillas again.  Please advise.     Patient:  [phone]  "

## 2017-02-02 ENCOUNTER — LAB (OUTPATIENT)
Dept: INTERNAL MEDICINE | Facility: CLINIC | Age: 82
End: 2017-02-02

## 2017-02-02 DIAGNOSIS — D69.6 THROMBOCYTOPENIA (HCC): ICD-10-CM

## 2017-02-03 DIAGNOSIS — D69.6 THROMBOCYTOPENIA (HCC): Primary | ICD-10-CM

## 2017-02-03 LAB
HCT VFR BLD AUTO: 43.5 % (ref 34–46.6)
HGB BLD-MCNC: 13.7 G/DL (ref 11.1–15.9)
MCH RBC QN AUTO: 24.7 PG (ref 26.6–33)
MCHC RBC AUTO-ENTMCNC: 31.5 G/DL (ref 31.5–35.7)
MCV RBC AUTO: 78 FL (ref 79–97)
MORPHOLOGY BLD-IMP: ABNORMAL
NRBC BLD AUTO-RTO: 0 %
PLATELET # BLD AUTO: 98 X10E3/UL (ref 150–379)
RBC # BLD AUTO: 5.55 X10E6/UL (ref 3.77–5.28)
WBC # BLD AUTO: 3.5 X10E3/UL (ref 3.4–10.8)

## 2017-02-06 ENCOUNTER — HOSPITAL ENCOUNTER (OUTPATIENT)
Dept: CARDIOLOGY | Facility: HOSPITAL | Age: 82
Discharge: HOME | End: 2017-02-06
Attending: INTERNAL MEDICINE | Admitting: INTERNAL MEDICINE

## 2017-02-06 LAB
BH CV NUCLEAR PRIOR STUDY: 3
BH CV STRESS BP STAGE 1: NORMAL
BH CV STRESS COMMENTS STAGE 1: NORMAL
BH CV STRESS DOSE REGADENOSON STAGE 1: 0.4
BH CV STRESS DURATION MIN STAGE 1: 0
BH CV STRESS DURATION SEC STAGE 1: 15
BH CV STRESS HR STAGE 1: 96
BH CV STRESS PROTOCOL 1: NORMAL
BH CV STRESS RECOVERY BP: NORMAL MMHG
BH CV STRESS RECOVERY HR: 81 BPM
BH CV STRESS STAGE 1: 1
LV EF NUC BP: 50 %
MAXIMAL PREDICTED HEART RATE: 140 BPM
PERCENT MAX PREDICTED HR: 68.57 %
STRESS BASELINE BP: NORMAL MMHG
STRESS BASELINE HR: 66 BPM
STRESS PERCENT HR: 81 %
STRESS POST EXERCISE DUR SEC: 15 SEC
STRESS POST PEAK BP: NORMAL MMHG
STRESS POST PEAK HR: 96 BPM
STRESS TARGET HR: 119 BPM

## 2017-02-06 PROCEDURE — A9502 TC99M TETROFOSMIN: HCPCS | Performed by: INTERNAL MEDICINE

## 2017-02-06 PROCEDURE — 93017 CV STRESS TEST TRACING ONLY: CPT

## 2017-02-06 PROCEDURE — 25010000002 REGADENOSON 0.4 MG/5ML SOLUTION

## 2017-02-06 PROCEDURE — 78452 HT MUSCLE IMAGE SPECT MULT: CPT

## 2017-02-06 PROCEDURE — 93018 CV STRESS TEST I&R ONLY: CPT | Performed by: INTERNAL MEDICINE

## 2017-02-06 PROCEDURE — 93016 CV STRESS TEST SUPVJ ONLY: CPT | Performed by: INTERNAL MEDICINE

## 2017-02-06 PROCEDURE — 0 TECHNETIUM TETROFOSMIN KIT: Performed by: INTERNAL MEDICINE

## 2017-02-06 PROCEDURE — 0 TECHNETIUM TETROFOSMIN KIT

## 2017-02-06 PROCEDURE — A9502 TC99M TETROFOSMIN: HCPCS

## 2017-02-06 PROCEDURE — 78452 HT MUSCLE IMAGE SPECT MULT: CPT | Performed by: INTERNAL MEDICINE

## 2017-02-06 RX ADMIN — TETROFOSMIN 1 DOSE: 1.38 INJECTION, POWDER, LYOPHILIZED, FOR SOLUTION INTRAVENOUS at 08:54

## 2017-02-14 ENCOUNTER — LAB (OUTPATIENT)
Dept: INTERNAL MEDICINE | Facility: CLINIC | Age: 82
End: 2017-02-14

## 2017-02-14 DIAGNOSIS — D69.6 THROMBOCYTOPENIA (HCC): ICD-10-CM

## 2017-02-14 LAB
BASOPHILS # BLD AUTO: 0.01 10*3/MM3 (ref 0–0.2)
BASOPHILS NFR BLD AUTO: 0.2 % (ref 0–1.5)
DIFFERENTIAL COMMENT: NORMAL
EOSINOPHIL # BLD AUTO: 0.15 10*3/MM3 (ref 0–0.7)
EOSINOPHIL # BLD AUTO: 3.6 % (ref 0.3–6.2)
HCT VFR BLD AUTO: 40.5 % (ref 35.6–45.5)
HGB BLD-MCNC: 13.2 G/DL (ref 11.9–15.5)
IMM GRANULOCYTES # BLD: 0 10*3/MM3 (ref 0–0.03)
IMM GRANULOCYTES NFR BLD: 0 % (ref 0–0.5)
LABORATORY COMMENT REPORT: NORMAL
LYMPHOCYTES # BLD AUTO: 0.78 10*3/MM3 (ref 0.9–4.8)
LYMPHOCYTES NFR BLD AUTO: 18.7 % (ref 19.6–45.3)
MCH RBC QN AUTO: 26.2 PG (ref 26.9–32)
MCHC RBC AUTO-ENTMCNC: 32.6 G/DL (ref 32.4–36.3)
MCV RBC AUTO: 80.4 FL (ref 80.5–98.2)
MONOCYTES # BLD AUTO: 0.51 10*3/MM3 (ref 0.2–1.2)
MONOCYTES NFR BLD AUTO: 12.2 % (ref 5–12)
NEUTROPHILS # BLD AUTO: 2.72 10*3/MM3 (ref 1.9–8.1)
NEUTROPHILS NFR BLD AUTO: 65.3 % (ref 42.7–76)
PLATELET # BLD AUTO: 117 10*3/MM3 (ref 140–500)
PLT COMMENT: NORMAL
RBC # BLD AUTO: 5.04 10*6/MM3 (ref 3.9–5.2)
WBC # BLD AUTO: 4.17 10*3/MM3 (ref 4.5–10.7)

## 2017-02-15 ENCOUNTER — TELEPHONE (OUTPATIENT)
Dept: CARDIOLOGY | Facility: CLINIC | Age: 82
End: 2017-02-15

## 2017-02-16 ENCOUNTER — TELEPHONE (OUTPATIENT)
Dept: INTERNAL MEDICINE | Facility: CLINIC | Age: 82
End: 2017-02-16

## 2017-02-16 DIAGNOSIS — I21.4 NSTEMI (NON-ST ELEVATED MYOCARDIAL INFARCTION) (HCC): Primary | ICD-10-CM

## 2017-02-16 NOTE — TELEPHONE ENCOUNTER
I called pt Re:lab results.  Spoke with her son Jesus.  He wanted to make sure that you knew they are trying to get pt scheduled for cath and stent placement with Dr. Earl.  JOAN

## 2017-02-24 ENCOUNTER — LAB (OUTPATIENT)
Dept: LAB | Facility: HOSPITAL | Age: 82
End: 2017-02-24
Attending: INTERNAL MEDICINE

## 2017-02-24 ENCOUNTER — TRANSCRIBE ORDERS (OUTPATIENT)
Dept: CARDIOLOGY | Facility: CLINIC | Age: 82
End: 2017-02-24

## 2017-02-24 DIAGNOSIS — I21.4 ACUTE MYOCARDIAL INFARCTION, SUBENDOCARDIAL INFARCTION, INITIAL EPISODE OF CARE (HCC): ICD-10-CM

## 2017-02-24 DIAGNOSIS — Z13.6 SCREENING FOR ISCHEMIC HEART DISEASE: ICD-10-CM

## 2017-02-24 DIAGNOSIS — Z01.810 PRE-OPERATIVE CARDIOVASCULAR EXAMINATION: Primary | ICD-10-CM

## 2017-02-24 DIAGNOSIS — Z01.810 PRE-OPERATIVE CARDIOVASCULAR EXAMINATION: ICD-10-CM

## 2017-02-24 LAB
ANION GAP SERPL CALCULATED.3IONS-SCNC: 16.3 MMOL/L
BASOPHILS # BLD AUTO: 0.03 10*3/MM3 (ref 0–0.2)
BASOPHILS NFR BLD AUTO: 0.5 % (ref 0–1.5)
BUN BLD-MCNC: 15 MG/DL (ref 8–23)
BUN/CREAT SERPL: 14.6 (ref 7–25)
CALCIUM SPEC-SCNC: 9.8 MG/DL (ref 8.6–10.5)
CHLORIDE SERPL-SCNC: 98 MMOL/L (ref 98–107)
CO2 SERPL-SCNC: 27.7 MMOL/L (ref 22–29)
CREAT BLD-MCNC: 1.03 MG/DL (ref 0.57–1)
DEPRECATED RDW RBC AUTO: 70.2 FL (ref 37–54)
EOSINOPHIL # BLD AUTO: 0.17 10*3/MM3 (ref 0–0.7)
EOSINOPHIL NFR BLD AUTO: 3.1 % (ref 0.3–6.2)
ERYTHROCYTE [DISTWIDTH] IN BLOOD BY AUTOMATED COUNT: 25.8 % (ref 11.7–13)
GFR SERPL CREATININE-BSD FRML MDRD: 52 ML/MIN/1.73
GLUCOSE BLD-MCNC: 154 MG/DL (ref 65–99)
HCT VFR BLD AUTO: 43 % (ref 35.6–45.5)
HGB BLD-MCNC: 14.4 G/DL (ref 11.9–15.5)
IMM GRANULOCYTES # BLD: 0 10*3/MM3 (ref 0–0.03)
IMM GRANULOCYTES NFR BLD: 0 % (ref 0–0.5)
LYMPHOCYTES # BLD AUTO: 1.38 10*3/MM3 (ref 0.9–4.8)
LYMPHOCYTES NFR BLD AUTO: 24.8 % (ref 19.6–45.3)
MCH RBC QN AUTO: 26.6 PG (ref 26.9–32)
MCHC RBC AUTO-ENTMCNC: 33.5 G/DL (ref 32.4–36.3)
MCV RBC AUTO: 79.3 FL (ref 80.5–98.2)
MONOCYTES # BLD AUTO: 0.4 10*3/MM3 (ref 0.2–1.2)
MONOCYTES NFR BLD AUTO: 7.2 % (ref 5–12)
NEUTROPHILS # BLD AUTO: 3.58 10*3/MM3 (ref 1.9–8.1)
NEUTROPHILS NFR BLD AUTO: 64.4 % (ref 42.7–76)
NRBC BLD MANUAL-RTO: 0 /100 WBC (ref 0–0)
PLATELET # BLD AUTO: 130 10*3/MM3 (ref 140–500)
PMV BLD AUTO: ABNORMAL FL (ref 6–12)
POTASSIUM BLD-SCNC: 3.5 MMOL/L (ref 3.5–5.2)
RBC # BLD AUTO: 5.42 10*6/MM3 (ref 3.9–5.2)
SODIUM BLD-SCNC: 142 MMOL/L (ref 136–145)
WBC NRBC COR # BLD: 5.56 10*3/MM3 (ref 4.5–10.7)

## 2017-02-24 PROCEDURE — 80048 BASIC METABOLIC PNL TOTAL CA: CPT

## 2017-02-24 PROCEDURE — 85025 COMPLETE CBC W/AUTO DIFF WBC: CPT

## 2017-02-24 PROCEDURE — 36415 COLL VENOUS BLD VENIPUNCTURE: CPT

## 2017-02-28 RX ORDER — ATORVASTATIN CALCIUM 80 MG/1
80 TABLET, FILM COATED ORAL DAILY
COMMUNITY
End: 2018-02-28 | Stop reason: SDUPTHER

## 2017-02-28 RX ORDER — FELODIPINE 2.5 MG/1
2.5 TABLET, EXTENDED RELEASE ORAL DAILY
COMMUNITY
End: 2018-02-23

## 2017-02-28 RX ORDER — HYDROCODONE BITARTRATE AND ACETAMINOPHEN 7.5; 325 MG/1; MG/1
1 TABLET ORAL EVERY 6 HOURS PRN
Status: ON HOLD | COMMUNITY
End: 2017-03-01

## 2017-02-28 RX ORDER — ACETAMINOPHEN 325 MG/1
650 TABLET ORAL EVERY 6 HOURS PRN
Status: ON HOLD | COMMUNITY
End: 2017-03-01

## 2017-02-28 RX ORDER — FUROSEMIDE 40 MG/1
40 TABLET ORAL 2 TIMES DAILY
COMMUNITY
End: 2017-08-31

## 2017-02-28 RX ORDER — CARVEDILOL 3.12 MG/1
3.12 TABLET ORAL 2 TIMES DAILY WITH MEALS
COMMUNITY
End: 2018-02-28 | Stop reason: SDUPTHER

## 2017-02-28 RX ORDER — ASPIRIN 81 MG/1
81 TABLET, CHEWABLE ORAL DAILY
Status: ON HOLD | COMMUNITY
End: 2017-03-01

## 2017-02-28 RX ORDER — FERROUS SULFATE 325(65) MG
325 TABLET ORAL
COMMUNITY
End: 2017-03-09 | Stop reason: SDUPTHER

## 2017-02-28 RX ORDER — POTASSIUM CHLORIDE 750 MG/1
10 TABLET, EXTENDED RELEASE ORAL 2 TIMES DAILY
COMMUNITY
End: 2017-08-31

## 2017-02-28 RX ORDER — PANTOPRAZOLE SODIUM 40 MG/1
40 TABLET, DELAYED RELEASE ORAL DAILY
Status: ON HOLD | COMMUNITY
End: 2017-03-01

## 2017-02-28 RX ORDER — DOCUSATE SODIUM 100 MG/1
100 CAPSULE, LIQUID FILLED ORAL 2 TIMES DAILY
COMMUNITY
End: 2019-04-03

## 2017-03-01 ENCOUNTER — HOSPITAL ENCOUNTER (OUTPATIENT)
Facility: HOSPITAL | Age: 82
Setting detail: HOSPITAL OUTPATIENT SURGERY
Discharge: HOME | End: 2017-03-01
Attending: INTERNAL MEDICINE | Admitting: INTERNAL MEDICINE

## 2017-03-01 VITALS
TEMPERATURE: 98.8 F | DIASTOLIC BLOOD PRESSURE: 72 MMHG | RESPIRATION RATE: 20 BRPM | WEIGHT: 150 LBS | HEART RATE: 70 BPM | BODY MASS INDEX: 26.58 KG/M2 | HEIGHT: 63 IN | OXYGEN SATURATION: 94 % | SYSTOLIC BLOOD PRESSURE: 145 MMHG

## 2017-03-01 DIAGNOSIS — I21.4 NSTEMI (NON-ST ELEVATED MYOCARDIAL INFARCTION) (HCC): ICD-10-CM

## 2017-03-01 PROCEDURE — S0260 H&P FOR SURGERY: HCPCS | Performed by: INTERNAL MEDICINE

## 2017-03-01 PROCEDURE — 93458 L HRT ARTERY/VENTRICLE ANGIO: CPT | Performed by: INTERNAL MEDICINE

## 2017-03-01 PROCEDURE — C1769 GUIDE WIRE: HCPCS | Performed by: INTERNAL MEDICINE

## 2017-03-01 PROCEDURE — 99153 MOD SED SAME PHYS/QHP EA: CPT | Performed by: INTERNAL MEDICINE

## 2017-03-01 PROCEDURE — 0 IOPAMIDOL PER 1 ML: Performed by: INTERNAL MEDICINE

## 2017-03-01 PROCEDURE — C1894 INTRO/SHEATH, NON-LASER: HCPCS | Performed by: INTERNAL MEDICINE

## 2017-03-01 PROCEDURE — 25010000002 HEPARIN (PORCINE) PER 1000 UNITS: Performed by: INTERNAL MEDICINE

## 2017-03-01 PROCEDURE — 25010000002 FENTANYL CITRATE (PF) 100 MCG/2ML SOLUTION: Performed by: INTERNAL MEDICINE

## 2017-03-01 PROCEDURE — 25010000002 MIDAZOLAM PER 1 MG: Performed by: INTERNAL MEDICINE

## 2017-03-01 PROCEDURE — 99152 MOD SED SAME PHYS/QHP 5/>YRS: CPT | Performed by: INTERNAL MEDICINE

## 2017-03-01 RX ORDER — LIDOCAINE HYDROCHLORIDE 20 MG/ML
INJECTION, SOLUTION INFILTRATION; PERINEURAL AS NEEDED
Status: DISCONTINUED | OUTPATIENT
Start: 2017-03-01 | End: 2017-03-01 | Stop reason: HOSPADM

## 2017-03-01 RX ORDER — SODIUM CHLORIDE 0.9 % (FLUSH) 0.9 %
1-10 SYRINGE (ML) INJECTION AS NEEDED
Status: DISCONTINUED | OUTPATIENT
Start: 2017-03-01 | End: 2017-03-01 | Stop reason: HOSPADM

## 2017-03-01 RX ORDER — SODIUM CHLORIDE 0.9 % (FLUSH) 0.9 %
1-10 SYRINGE (ML) INJECTION AS NEEDED
Status: CANCELLED | OUTPATIENT
Start: 2017-03-01

## 2017-03-01 RX ORDER — SODIUM CHLORIDE 9 MG/ML
75 INJECTION, SOLUTION INTRAVENOUS CONTINUOUS
Status: DISCONTINUED | OUTPATIENT
Start: 2017-03-01 | End: 2017-03-01 | Stop reason: HOSPADM

## 2017-03-01 RX ORDER — MIDAZOLAM HYDROCHLORIDE 1 MG/ML
INJECTION INTRAMUSCULAR; INTRAVENOUS AS NEEDED
Status: DISCONTINUED | OUTPATIENT
Start: 2017-03-01 | End: 2017-03-01 | Stop reason: HOSPADM

## 2017-03-01 RX ORDER — LIDOCAINE HYDROCHLORIDE 10 MG/ML
0.1 INJECTION, SOLUTION EPIDURAL; INFILTRATION; INTRACAUDAL; PERINEURAL ONCE AS NEEDED
Status: DISCONTINUED | OUTPATIENT
Start: 2017-03-01 | End: 2017-03-01 | Stop reason: HOSPADM

## 2017-03-01 RX ORDER — SODIUM CHLORIDE 9 MG/ML
100 INJECTION, SOLUTION INTRAVENOUS CONTINUOUS
Status: CANCELLED | OUTPATIENT
Start: 2017-03-01

## 2017-03-01 RX ORDER — FENTANYL CITRATE 50 UG/ML
INJECTION, SOLUTION INTRAMUSCULAR; INTRAVENOUS AS NEEDED
Status: DISCONTINUED | OUTPATIENT
Start: 2017-03-01 | End: 2017-03-01 | Stop reason: HOSPADM

## 2017-03-01 RX ADMIN — SODIUM CHLORIDE 75 ML/HR: 9 INJECTION, SOLUTION INTRAVENOUS at 09:45

## 2017-03-01 NOTE — DISCHARGE INSTRUCTIONS
Outpatient Surgery Guidelines, Adult  Outpatient procedures are those for which the person having the procedure is allowed to go home the same day as the procedure. Various procedures are done on an outpatient basis. You should follow some general guidelines if you will be having an outpatient procedure.  AFTER THE  PROCEDURE  After surgery, you will be taken to a recovery area, where your progress will be monitored. If there are no complications, you will be allowed to go home when you are awake, stable, and taking fluids well. You may have numbness around the surgical site. Healing will take some time. You will have tenderness at the surgical site and may have some swelling and bruising. You may also have some nausea.  HOME CARE INSTRUCTIONS  · Do not drive for 24 hours, or as directed by your health care provider. Do not drive while taking prescription pain medicines.  · Do not drink alcohol for 24 hours.  · Do not make important decisions or sign legal documents for 24 hours.  · Plan on having a responsible adult stay with your for 24 hours following your procedure.  · You may resume a normal diet and activities as directed.  · Do not lift anything heavier than 10 pounds (4.5 kg) or play contact sports until your health care provider says it is okay.  · Only take over-the-counter or prescription medicines as directed by your health care provider.  · Follow up with your health care provider as directed.  SEEK MEDICAL CARE IF:  · You have increased bleeding (more than a small spot) from the surgical site.  · You have redness, swelling, or increasing pain in the wound.  · You see pus coming from the wound.  · You have a fever > 101.  · You notice a bad smell coming from the wound or dressing.  · You feel lightheaded or faint.  · You develop a rash.  · You have trouble breathing.  · You develop allergies.  MAKE SURE YOU:  · Understand these instructions.  · Will watch your condition.  · Will get help right away if  you are not doing well or get worse          .[facility]  [address] [place] [address]    Coronary Angiogram (Radial/Ulnar Approach) After Care    Refer to this sheet in the next few weeks. These instructions provide you with information on caring for yourself after your procedure. Your caregiver may also give you more specific instructions. Your treatment has been planned according to current medical practices, but problems sometimes occur. Call your caregiver if you have any problems or questions after your procedure.    Home Care Instructions:  You may shower the day after the procedure. Remove the bandage (dressing) and gently wash the site with plain soap and water. Gently pat the site dry. You may apply a band aid daily for 2 days if desired.    Do not apply powder or lotion to the site.  Do not submerge the affected site in water for 3 to 5 days or until the site is completely healed.   Do not lift, push or pull anything over 10 pounds for 2 days after your procedure.  Inspect the site at least twice daily. You may notice some bruising at the site and it may be tender for 1 to 2 weeks.     Increase your fluid intake for the next 2 days.    Keep arm elevated for 24 hours. For the remainder of the day, keep your arm in “Pledge of Allegiance” position when up and about.     You may drive 24 hours after the procedure unless otherwise instructed by your caregiver.  Do not operate machinery or power tools for 24 hours.  A responsible adult should be with you for the first 24 hours after you arrive home. Do not make any important legal decisions or sign legal papers for 24 hours.      Call Your Doctor if:   You have unusual pain at the radial/ulnar (wrist) site.  You have redness, warmth, swelling, or pain at the radial/ulnar (wrist) site.  You have drainage (other than a small amount of blood on the dressing).  You have chills or a fever > 101.  Your arm becomes pale or dark, cool, tingly,  or numb.  You have heavy bleeding from the site, hold pressure on the site for 20 minutes.  If the bleeding stops, apply a fresh bandage and call your cardiologist.  However, if you continue to have bleeding, call 911.      ·

## 2017-03-01 NOTE — H&P
CHIEF COMPLAINT:  1. Non-ST elevation myocardial infarction.   2. Positive stress    HISTORY OF PRESENT ILLNESS:   Recent iron deficiency anemia, non-ST elevation myocardial infarction, small bilateral pleural effusions, and the finding on GI evaluation of multiple colonic angiodysplastic lesions treated with argon plasma coagulation. The patient denied any chest pain at that time. Secondary to her non-ST elevation myocardial infarction, she did undergo a thoracic echocardiogram which showed her to have a normal ejection fraction but an inferior wall motion defect consistent with mild hypokinesis. She had mild-to-moderate mitral regurgitation, mild tricuspid regurgitation and a right ventricular systolic pressure of 43 mmHg.      Secondary to her anemia and need for transfusion, she did not undergo an ischemic evaluation at that time as dual antiplatelet therapy would be contraindicated. In addition, she was asymptomatic from an angina standpoint.      Since receiving blood, she has not noticed any additional bright red blood per stools or melena.     Patient underwent stress test showing a medium-sized infarct in the inferior wall with moderate nova-infarct anemia.  Secondary to the stress test, along with prior non-ST elevation MI she has been referred for cath        Review of Systems   Constitution: Negative for fever, weakness and malaise/fatigue.   HENT: Negative for nosebleeds and sore throat.   Eyes: Negative for blurred vision and double vision.   Cardiovascular: Negative for chest pain, claudication, palpitations and syncope.   Respiratory: Negative for cough, shortness of breath and snoring.   Endocrine: Negative for cold intolerance, heat intolerance and polydipsia.   Skin: Negative for itching, poor wound healing and rash.   Musculoskeletal: Negative for joint pain, joint swelling, muscle weakness and myalgias.   Gastrointestinal: Negative for abdominal pain, melena, nausea and vomiting.   Neurological:  Negative for light-headedness, loss of balance, seizures and vertigo.   Psychiatric/Behavioral: Negative for altered mental status and depression.             Medical History          Past Medical History   Diagnosis Date   • Gallbladder stone without cholecystitis or obstruction         gallstone seen on ultrasound 2007   • Health care maintenance     • Hypertension     • Lumbar canal stenosis     • Lumbar radiculopathy     • Osteoarthritis              The following portions of the patient's history were reviewed and updated as appropriate: Social history , Family history and Surgical history             Current Outpatient Prescriptions on File Prior to Visit   Medication Sig Dispense Refill   • acetaminophen (TYLENOL) 325 MG tablet Take 2 tablets by mouth Every 4 (Four) Hours As Needed for mild pain (1-3).   0   • aspirin 81 MG EC tablet Take 1 tablet by mouth Daily. 90 tablet 0   • atorvastatin (LIPITOR) 80 MG tablet Take 1 tablet by mouth Every Night. 30 tablet 0   • BIOTIN PO Take by mouth daily.       • CALCIUM PO Take by mouth 2 (two) times a day.       • carvedilol (COREG) 3.125 MG tablet Take 1 tablet by mouth Every 12 (Twelve) Hours. 60 tablet 0   • Cholecalciferol (VITAMIN D-3 PO) Take by mouth.       • docusate sodium 100 MG capsule Take 100 mg by mouth 2 (Two) Times a Day.   0   • felodipine (PLENDIL) 2.5 MG 24 hr tablet Take 1 tablet by mouth Daily. 90 tablet 1   • ferrous sulfate 325 (65 FE) MG tablet Take 1 tablet by mouth 2 (Two) Times a Day With Meals. 60 tablet 1   • furosemide (LASIX) 40 MG tablet Take 1 tablet by mouth Daily. 30 tablet 0   • HYDROcodone-acetaminophen (NORCO) 7.5-325 MG per tablet Take 1 tablet by mouth Every 6 (Six) Hours As Needed for moderate pain (4-6). 60 tablet 0   • Multiple Vitamins-Minerals (CENTRUM SILVER) tablet Take 1 tablet by mouth Daily.       • pantoprazole (PROTONIX) 40 MG EC tablet Take 1 tablet by mouth Daily. 90 tablet 0   • potassium chloride  (K-DUR,KLOR-CON) 10 MEQ CR tablet Take 1 tablet by mouth Daily. 30 tablet 0   • PROAIR  (90 BASE) MCG/ACT inhaler INHALE 2 PUFFS QID   0   • QVAR 80 MCG/ACT inhaler INHALE 2 PUFFS BID   0      No current facility-administered medications on file prior to visit.          No Known Allergies      Physical Exam   Constitutional: She is oriented to person, place, and time. She appears well-developed and well-nourished.   HENT:   Head: Normocephalic and atraumatic.   Eyes: Conjunctivae and EOM are normal. No scleral icterus.   Neck: Normal range of motion. Neck supple. Normal carotid pulses, no hepatojugular reflux and no JVD present. Carotid bruit is not present. No tracheal deviation present. No thyromegaly present.   Cardiovascular: Normal rate and regular rhythm. Exam reveals no gallop and no friction rub.   No murmur heard.  Pulses:  Carotid pulses are 2+ on the right side, and 2+ on the left side.  Radial pulses are 2+ on the right side, and 2+ on the left side.   Femoral pulses are 2+ on the right side, and 2+ on the left side.  Dorsalis pedis pulses are 2+ on the right side, and 2+ on the left side.   Posterior tibial pulses are 2+ on the right side, and 2+ on the left side.   Pulmonary/Chest: Breath sounds normal. No respiratory distress. She has no decreased breath sounds. She has no wheezes. She has no rhonchi. She has no rales. She exhibits no tenderness.   Abdominal: Soft. Bowel sounds are normal. She exhibits no distension. There is no tenderness. There is no rebound.   Musculoskeletal: She exhibits no edema or deformity.   Neurological: She is alert and oriented to person, place, and time. She has normal strength. No sensory deficit.   Skin: No rash noted. No erythema.   Psychiatric: She has a normal mood and affect. Her behavior is normal.               No components found for: CBC  No results found for: CMP  No components found for: LIPID  No results found for: BMP           DISCUSSION/SUMMARY    She is a very pleasant 81-year-old female with a medical history as documented above including non-ST elevation myocardial infarction in the setting of acute blood loss anemia and colonic angiodysplastic lesions requiring argon laser therapy, wall motion abnormality of the inferior wall with a preserved ejection fraction, mitral and tricuspid regurgitation who presents back for followup.  She underwent stress test showing an infarct with nova-infarct ischemia that was at least moderate in size.     1. Non-ST elevation myocardial infarction  2. Essential hypertension; at goal. Continue current therapy.   3. Positive stress test

## 2017-03-01 NOTE — PLAN OF CARE
Problem: Patient Care Overview (Adult)  Goal: Plan of Care Review  Outcome: Outcome(s) achieved Date Met:  03/01/17 03/01/17 1253   Coping/Psychosocial Response Interventions   Plan Of Care Reviewed With patient;erica   Patient Care Overview   Progress improving   Outcome Evaluation   Outcome Summary/Follow up Plan READY FOR DISCHARGE       Goal: Adult Individualization and Mutuality  Outcome: Outcome(s) achieved Date Met:  03/01/17  Goal: Discharge Needs Assessment  Outcome: Outcome(s) achieved Date Met:  03/01/17    Problem: Cardiac Catheterization with/without PCI (Adult)  Goal: Signs and Symptoms of Listed Potential Problems Will be Absent or Manageable (Cardiac Catheterization with/without PCI)  Outcome: Outcome(s) achieved Date Met:  03/01/17

## 2017-03-01 NOTE — PERIOPERATIVE NURSING NOTE
.bleeding precautions and activity restrictions reviewed with patient, verbalizes and demonstrate understanding

## 2017-03-03 ENCOUNTER — TELEPHONE (OUTPATIENT)
Dept: CARDIOLOGY | Facility: CLINIC | Age: 82
End: 2017-03-03

## 2017-03-03 NOTE — TELEPHONE ENCOUNTER
Pt son Dewey called stating his mom was recently Cathed (coronary angiography). Son is requesting a call from you to have a review with you about mother's meds. Son states mom went from having no medications to 13. dewey is requesting that his mom be taken off some of the meds. Please call. Thank, Mery

## 2017-03-03 NOTE — TELEPHONE ENCOUNTER
I spoke with her son.  He is going to decrease her Lasix and potassium to once daily and monitor for swelling and shortness of air.  If after one week she is doing well off of both of these medications I told him that he can discontinue as long as he does daily weight monitoring and checks on her swelling

## 2017-03-09 RX ORDER — FERROUS SULFATE 325(65) MG
325 TABLET ORAL
Qty: 30 TABLET | Refills: 1 | Status: SHIPPED | OUTPATIENT
Start: 2017-03-09 | End: 2017-04-18 | Stop reason: SDUPTHER

## 2017-03-09 NOTE — TELEPHONE ENCOUNTER
----- Message from Epifanio Betancur sent at 3/9/2017  8:16 AM EST -----  Contact: pt  Pt calling and would like a refill on RX sent into Pharmacy. Please advise.     ferrous sulfate 325 (65 FE) MG tablet    Prescribed by cardiologist when in the hospital, pt needs Dr. Casillas to refill it.     Send to :[facility] [address] [place] [address] [id number] AT [facility] [place] [facility] - [phone] [facility] [phone] FX    Pt# [phone]

## 2017-03-10 RX ORDER — PANTOPRAZOLE SODIUM 40 MG/1
40 TABLET, DELAYED RELEASE ORAL DAILY
Qty: 30 TABLET | Refills: 3 | Status: SHIPPED | OUTPATIENT
Start: 2017-03-10 | End: 2017-07-13 | Stop reason: SDUPTHER

## 2017-03-10 RX ORDER — PANTOPRAZOLE SODIUM 40 MG/1
TABLET, DELAYED RELEASE ORAL
Qty: 90 TABLET | Refills: 3 | OUTPATIENT
Start: 2017-03-10

## 2017-03-10 NOTE — TELEPHONE ENCOUNTER
----- Message from Loan Quesada sent at 3/10/2017  8:30 AM EST -----  Contact: Patient  Patient called stating she needs refills on     CALCIUM PO  Pantoprazole 40 mg    She was in hospital recently and she cannot get these filled as she was seeing hospitalist.  Please advise.     Patient:  [phone]    Pharmacy:  [facility] [address] [place] [address] [address] AT [facility] - [phone] [facility] [phone] FX

## 2017-03-10 NOTE — TELEPHONE ENCOUNTER
Spoke with patient, patient is unaware of the MG of the calcium or biotin. Patient would like to know what MG she should be taken. Also can she get these through her pharmacy or should they be bought over the counter. Please advise    Also patient is requesting refill of pantoprazole 40 once daily    Last OV 01/24/2017

## 2017-04-18 RX ORDER — FERROUS SULFATE 325(65) MG
TABLET ORAL
Qty: 30 TABLET | Refills: 0 | Status: SHIPPED | OUTPATIENT
Start: 2017-04-18 | End: 2017-05-19 | Stop reason: SDUPTHER

## 2017-05-22 RX ORDER — FERROUS SULFATE 325(65) MG
TABLET ORAL
Qty: 30 TABLET | Refills: 0 | Status: SHIPPED | OUTPATIENT
Start: 2017-05-22 | End: 2017-06-21 | Stop reason: SDUPTHER

## 2017-05-31 ENCOUNTER — OFFICE VISIT (OUTPATIENT)
Dept: INTERNAL MEDICINE | Facility: CLINIC | Age: 82
End: 2017-05-31

## 2017-05-31 VITALS
DIASTOLIC BLOOD PRESSURE: 62 MMHG | SYSTOLIC BLOOD PRESSURE: 158 MMHG | WEIGHT: 149 LBS | BODY MASS INDEX: 26.4 KG/M2 | RESPIRATION RATE: 14 BRPM | HEIGHT: 63 IN

## 2017-05-31 DIAGNOSIS — E78.5 HYPERLIPIDEMIA, UNSPECIFIED HYPERLIPIDEMIA TYPE: ICD-10-CM

## 2017-05-31 DIAGNOSIS — R73.01 ELEVATED FASTING GLUCOSE: ICD-10-CM

## 2017-05-31 DIAGNOSIS — I10 ESSENTIAL HYPERTENSION: Primary | ICD-10-CM

## 2017-05-31 LAB
ALBUMIN SERPL-MCNC: 3.7 G/DL (ref 3.4–4.6)
ALBUMIN/GLOB SERPL: 1.3 G/DL
ALP SERPL-CCNC: 144 U/L (ref 46–116)
ALT SERPL W P-5'-P-CCNC: 60 U/L (ref 14–59)
ANION GAP SERPL CALCULATED.3IONS-SCNC: 11 MMOL/L
AST SERPL-CCNC: 70 U/L (ref 7–37)
BILIRUB SERPL-MCNC: 0.9 MG/DL (ref 0.2–1)
BUN BLD-MCNC: 13 MG/DL (ref 6–22)
BUN/CREAT SERPL: 15.3 (ref 7–25)
CALCIUM SPEC-SCNC: 9.5 MG/DL (ref 8.6–10.5)
CHLORIDE SERPL-SCNC: 106 MMOL/L (ref 95–107)
CHOLEST SERPL-MCNC: 120 MG/DL (ref 0–200)
CO2 SERPL-SCNC: 26 MMOL/L (ref 23–32)
CREAT BLD-MCNC: 0.85 MG/DL (ref 0.55–1.02)
DEPRECATED RDW RBC AUTO: 47.7 FL (ref 37–54)
ERYTHROCYTE [DISTWIDTH] IN BLOOD BY AUTOMATED COUNT: 14.6 % (ref 11.5–15)
GFR SERPL CREATININE-BSD FRML MDRD: 64 ML/MIN/1.73
GLOBULIN UR ELPH-MCNC: 2.9 GM/DL
GLUCOSE BLD-MCNC: 120 MG/DL (ref 70–100)
HBA1C MFR BLD: 5.9 % (ref 4–6)
HCT VFR BLD AUTO: 39 % (ref 34.1–44.9)
HDLC SERPL-MCNC: 39 MG/DL (ref 40–81)
HGB BLD-MCNC: 13 G/DL (ref 11.2–15.7)
LDLC SERPL CALC-MCNC: 59 MG/DL (ref 0–100)
LDLC/HDLC SERPL: 1.52 {RATIO}
MCH RBC QN AUTO: 30.2 PG (ref 26–34)
MCHC RBC AUTO-ENTMCNC: 33.3 G/DL (ref 31–37)
MCV RBC AUTO: 90.5 FL (ref 80–100)
PLATELET # BLD AUTO: 73 10*3/MM3 (ref 150–450)
PMV BLD AUTO: 12.9 FL (ref 6–12)
POTASSIUM BLD-SCNC: 4.2 MMOL/L (ref 3.3–5.3)
PROT SERPL-MCNC: 6.6 G/DL (ref 6.3–8.4)
RBC # BLD AUTO: 4.31 10*6/MM3 (ref 3.93–5.22)
SODIUM BLD-SCNC: 143 MMOL/L (ref 136–145)
TRIGL SERPL-MCNC: 109 MG/DL (ref 0–150)
VLDLC SERPL-MCNC: 21.8 MG/DL
WBC NRBC COR # BLD: 3.56 10*3/MM3 (ref 5–10)

## 2017-05-31 PROCEDURE — 80061 LIPID PANEL: CPT | Performed by: INTERNAL MEDICINE

## 2017-05-31 PROCEDURE — 83036 HEMOGLOBIN GLYCOSYLATED A1C: CPT | Performed by: INTERNAL MEDICINE

## 2017-05-31 PROCEDURE — 36415 COLL VENOUS BLD VENIPUNCTURE: CPT | Performed by: INTERNAL MEDICINE

## 2017-05-31 PROCEDURE — 80053 COMPREHEN METABOLIC PANEL: CPT | Performed by: INTERNAL MEDICINE

## 2017-05-31 PROCEDURE — 85027 COMPLETE CBC AUTOMATED: CPT | Performed by: INTERNAL MEDICINE

## 2017-05-31 PROCEDURE — 99213 OFFICE O/P EST LOW 20 MIN: CPT | Performed by: INTERNAL MEDICINE

## 2017-05-31 RX ORDER — HYDROCODONE BITARTRATE AND ACETAMINOPHEN 7.5; 325 MG/1; MG/1
1 TABLET ORAL EVERY 6 HOURS PRN
Qty: 50 TABLET | Refills: 0 | Status: SHIPPED | OUTPATIENT
Start: 2017-05-31 | End: 2018-02-28 | Stop reason: SDUPTHER

## 2017-06-05 ENCOUNTER — TELEPHONE (OUTPATIENT)
Dept: INTERNAL MEDICINE | Facility: CLINIC | Age: 82
End: 2017-06-05

## 2017-06-05 DIAGNOSIS — D69.6 THROMBOCYTOPENIA (HCC): Primary | ICD-10-CM

## 2017-06-19 ENCOUNTER — LAB (OUTPATIENT)
Dept: LAB | Facility: HOSPITAL | Age: 82
End: 2017-06-19

## 2017-06-19 ENCOUNTER — CONSULT (OUTPATIENT)
Dept: ONCOLOGY | Facility: CLINIC | Age: 82
End: 2017-06-19

## 2017-06-19 VITALS
HEIGHT: 63 IN | DIASTOLIC BLOOD PRESSURE: 70 MMHG | OXYGEN SATURATION: 96 % | SYSTOLIC BLOOD PRESSURE: 156 MMHG | TEMPERATURE: 98 F | RESPIRATION RATE: 16 BRPM | HEART RATE: 57 BPM | BODY MASS INDEX: 26.12 KG/M2 | WEIGHT: 147.4 LBS

## 2017-06-19 DIAGNOSIS — D70.9 NEUTROPENIA, UNSPECIFIED TYPE (HCC): ICD-10-CM

## 2017-06-19 DIAGNOSIS — D69.6 THROMBOCYTOPENIA (HCC): Primary | ICD-10-CM

## 2017-06-19 DIAGNOSIS — D69.6 THROMBOCYTOPENIA (HCC): ICD-10-CM

## 2017-06-19 DIAGNOSIS — D50.9 IRON DEFICIENCY ANEMIA, UNSPECIFIED IRON DEFICIENCY ANEMIA TYPE: ICD-10-CM

## 2017-06-19 DIAGNOSIS — R53.1 GENERAL WEAKNESS: ICD-10-CM

## 2017-06-19 DIAGNOSIS — M48.061 LUMBAR CANAL STENOSIS: Primary | ICD-10-CM

## 2017-06-19 PROBLEM — D72.819 LEUKOPENIA: Status: ACTIVE | Noted: 2017-06-19

## 2017-06-19 LAB
ALBUMIN SERPL-MCNC: 3.9 G/DL (ref 3.5–5.2)
ALBUMIN/GLOB SERPL: 1.3 G/DL (ref 1.1–2.4)
ALP SERPL-CCNC: 128 U/L (ref 38–116)
ALT SERPL W P-5'-P-CCNC: 40 U/L (ref 0–33)
ANION GAP SERPL CALCULATED.3IONS-SCNC: 12.6 MMOL/L
AST SERPL-CCNC: 56 U/L (ref 0–32)
BASOPHILS # BLD AUTO: 0.02 10*3/MM3 (ref 0–0.1)
BASOPHILS NFR BLD AUTO: 0.6 % (ref 0–1.1)
BILIRUB SERPL-MCNC: 0.6 MG/DL (ref 0.1–1.2)
BUN BLD-MCNC: 16 MG/DL (ref 6–20)
BUN/CREAT SERPL: 17 (ref 7.3–30)
CALCIUM SPEC-SCNC: 9.7 MG/DL (ref 8.5–10.2)
CHLORIDE SERPL-SCNC: 103 MMOL/L (ref 98–107)
CHROMATIN AB SERPL-ACNC: 57.4 IU/ML (ref 0–14)
CO2 SERPL-SCNC: 27.4 MMOL/L (ref 22–29)
CREAT BLD-MCNC: 0.94 MG/DL (ref 0.6–1.1)
CRP SERPL-MCNC: 0.12 MG/DL (ref 0–0.5)
DEPRECATED RDW RBC AUTO: 45.7 FL (ref 37–49)
EOSINOPHIL # BLD AUTO: 0.13 10*3/MM3 (ref 0–0.36)
EOSINOPHIL NFR BLD AUTO: 3.6 % (ref 1–5)
ERYTHROCYTE [DISTWIDTH] IN BLOOD BY AUTOMATED COUNT: 14.2 % (ref 11.7–14.5)
ERYTHROCYTE [SEDIMENTATION RATE] IN BLOOD: 11 MM/HR (ref 0–30)
FERRITIN SERPL-MCNC: 136.8 NG/ML
GFR SERPL CREATININE-BSD FRML MDRD: 57 ML/MIN/1.73
GLOBULIN UR ELPH-MCNC: 3 GM/DL (ref 1.8–3.5)
GLUCOSE BLD-MCNC: 123 MG/DL (ref 74–124)
HCT VFR BLD AUTO: 40.6 % (ref 34–45)
HGB BLD-MCNC: 13.8 G/DL (ref 11.5–14.9)
HGB RETIC QN: 35.7 PG (ref 29.8–36.1)
IMM GRANULOCYTES # BLD: 0 10*3/MM3 (ref 0–0.03)
IMM GRANULOCYTES NFR BLD: 0 % (ref 0–0.5)
IMM RETICS NFR: 4.2 % (ref 3–15.8)
IRON 24H UR-MRATE: 89 MCG/DL (ref 37–145)
IRON SATN MFR SERPL: 29 % (ref 14–48)
LDH SERPL-CCNC: 198 U/L (ref 99–259)
LYMPHOCYTES # BLD AUTO: 1.31 10*3/MM3 (ref 1–3.5)
LYMPHOCYTES NFR BLD AUTO: 36.1 % (ref 20–49)
MCH RBC QN AUTO: 29.9 PG (ref 27–33)
MCHC RBC AUTO-ENTMCNC: 34 G/DL (ref 32–35)
MCV RBC AUTO: 88.1 FL (ref 83–97)
MONOCYTES # BLD AUTO: 0.33 10*3/MM3 (ref 0.25–0.8)
MONOCYTES NFR BLD AUTO: 9.1 % (ref 4–12)
NEUTROPHILS # BLD AUTO: 1.84 10*3/MM3 (ref 1.5–7)
NEUTROPHILS NFR BLD AUTO: 50.6 % (ref 39–75)
NRBC BLD MANUAL-RTO: 0 /100 WBC (ref 0–0)
PLATELET # BLD AUTO: 86 10*3/MM3 (ref 150–375)
PLATELETS.RETICULATED NFR BLD AUTO: 9.7 % (ref 1.1–6.1)
PMV BLD AUTO: 11.2 FL (ref 8.9–12.1)
POTASSIUM BLD-SCNC: 4.1 MMOL/L (ref 3.5–4.7)
PROT SERPL-MCNC: 6.9 G/DL (ref 6.3–8)
RBC # BLD AUTO: 4.61 10*6/MM3 (ref 3.9–5)
RETICS/RBC NFR AUTO: 1.33 % (ref 0.6–2)
SODIUM BLD-SCNC: 143 MMOL/L (ref 134–145)
TIBC SERPL-MCNC: 309 MCG/DL (ref 249–505)
TRANSFERRIN SERPL-MCNC: 221 MG/DL (ref 200–360)
VIT B12 BLD-MCNC: 1170 PG/ML (ref 250–999)
WBC NRBC COR # BLD: 3.63 10*3/MM3 (ref 4–10)

## 2017-06-19 PROCEDURE — 82607 VITAMIN B-12: CPT | Performed by: INTERNAL MEDICINE

## 2017-06-19 PROCEDURE — 36415 COLL VENOUS BLD VENIPUNCTURE: CPT | Performed by: INTERNAL MEDICINE

## 2017-06-19 PROCEDURE — 83615 LACTATE (LD) (LDH) ENZYME: CPT | Performed by: INTERNAL MEDICINE

## 2017-06-19 PROCEDURE — 86140 C-REACTIVE PROTEIN: CPT | Performed by: INTERNAL MEDICINE

## 2017-06-19 PROCEDURE — 85652 RBC SED RATE AUTOMATED: CPT | Performed by: INTERNAL MEDICINE

## 2017-06-19 PROCEDURE — 83540 ASSAY OF IRON: CPT | Performed by: INTERNAL MEDICINE

## 2017-06-19 PROCEDURE — 82728 ASSAY OF FERRITIN: CPT | Performed by: INTERNAL MEDICINE

## 2017-06-19 PROCEDURE — 99204 OFFICE O/P NEW MOD 45 MIN: CPT | Performed by: INTERNAL MEDICINE

## 2017-06-19 PROCEDURE — 88189 FLOWCYTOMETRY/READ 16 & >: CPT | Performed by: INTERNAL MEDICINE

## 2017-06-19 PROCEDURE — 88185 FLOWCYTOMETRY/TC ADD-ON: CPT | Performed by: INTERNAL MEDICINE

## 2017-06-19 PROCEDURE — 85046 RETICYTE/HGB CONCENTRATE: CPT | Performed by: INTERNAL MEDICINE

## 2017-06-19 PROCEDURE — 88184 FLOWCYTOMETRY/ TC 1 MARKER: CPT | Performed by: INTERNAL MEDICINE

## 2017-06-19 PROCEDURE — 84466 ASSAY OF TRANSFERRIN: CPT | Performed by: INTERNAL MEDICINE

## 2017-06-19 PROCEDURE — 86431 RHEUMATOID FACTOR QUANT: CPT | Performed by: INTERNAL MEDICINE

## 2017-06-19 PROCEDURE — 85055 RETICULATED PLATELET ASSAY: CPT | Performed by: INTERNAL MEDICINE

## 2017-06-19 PROCEDURE — 80053 COMPREHEN METABOLIC PANEL: CPT | Performed by: INTERNAL MEDICINE

## 2017-06-19 NOTE — PROGRESS NOTES
"  Subjective     REASON FOR CONSULTATION:  Thrombocytopenia  Provide an opinion on any further workup or treatment                             REQUESTING PHYSICIAN:  Dr. Crista Casillas    RECORDS OBTAINED:  Records of the patients history including those obtained from the referring provider were reviewed and summarized in detail.    HISTORY OF PRESENT ILLNESS:  The patient is a 81 y.o. year old female who is here for an opinion about the above issue.    History of Present Illness patient is a 81-year-old female who was referred here for evaluation of thrombus cytopenia.  She has a history of hypertension and hyperlipidemia..  Her CBC her hemoglobin was 13.0 white count of 3.56 platelet of 73 on May 31, 2017.  Looking back even in December 2016 her hemoglobin was 9.2 white count of 7.57\" platelet of 98.  She did drop her hemoglobin to 7.9.  Today her hemoglobin is back up to 13.8.  She has no complaints.  She feels reasonably good and is active.  She is not on any medications that can affect her platelet count.  She is been on Plendil 4 years and we will check if Plendil can cause low platelet count and low white count.  Rest of the drugs do not seem to affect her platelet count.  She has no active bleeding.  Patient underwent EGD and colonoscopy by Dr. Yeboah and apparently underwent EGD which showed chronic gastritis in December 2016.  Colonoscopy showed evidence of multiple angiodysplasias in the ascending colon for which he patient underwent coagulation.  Since then patient has not had GI bleeding.  Patient also had a non-ST elevation myocardial infarction in March 2017.  Which patient is on treatment with medications.  Patient has some chronic fatigue otherwise feeling reasonably well.  She has lost significant amount of weight in the last 6 months.    Past Medical History:   Diagnosis Date   • Anemia    • Gallbladder stone without cholecystitis or obstruction     gallstone seen on ultrasound 2007   • GERD " (gastroesophageal reflux disease)    • Health care maintenance    • Hyperlipidemia    • Hypertension    • Lumbar canal stenosis    • Lumbar radiculopathy    • MI (myocardial infarction) 12/25/2016    NON-STEMI   • Osteoarthritis    • Thrombophlebitis of superficial veins of upper extremities     LEFT        Past Surgical History:   Procedure Laterality Date   • CARDIAC CATHETERIZATION N/A 3/1/2017    Procedure: Coronary angiography;  Surgeon: Desean Earl MD;  Location: [facility] CATH INVASIVE LOCATION;  Service:    • COLONOSCOPY N/A 12/27/2016    Procedure: COLONOSCOPY INTO CECUM WITH ARGON PLASMA COAGULATION;  Surgeon: Javier Peñaloza MD;  Location: [facility] ENDOSCOPY;  Service:    • ENDOSCOPY N/A 12/27/2016    Procedure: ESOPHAGOGASTRODUODENOSCOPY WITH COLD BIOPSIES;  Surgeon: Javier Peñaloza MD;  Location: [facility] ENDOSCOPY;  Service:    • HIP ARTHROPLASTY Right 04/01/2015   • HYSTERECTOMY  1986   • KNEE SURGERY Bilateral     2007 & 2008   • LUNG SURGERY  1965   • TONSILLECTOMY          Current Outpatient Prescriptions on File Prior to Visit   Medication Sig Dispense Refill   • atorvastatin (LIPITOR) 80 MG tablet Take 80 mg by mouth Daily.     • BIOTIN PO Take 1 tablet by mouth Daily.     • CALCIUM PO Take 2 tablets by mouth Daily.     • carvedilol (COREG) 3.125 MG tablet Take 3.125 mg by mouth 2 (Two) Times a Day With Meals.     • Cholecalciferol (VITAMIN D-3 PO) Take 1 tablet by mouth Daily.     • docusate sodium (COLACE) 100 MG capsule Take 100 mg by mouth 2 (Two) Times a Day.     • felodipine (PLENDIL) 2.5 MG 24 hr tablet Take 2.5 mg by mouth Daily.     • ferrous sulfate 325 (65 FE) MG tablet TAKE 1 TABLET BY MOUTH DAILY WITH BREAKFAST 30 tablet 0   • furosemide (LASIX) 40 MG tablet Take 40 mg by mouth 2 (Two) Times a Day.     • HYDROcodone-acetaminophen (NORCO) 7.5-325 MG per tablet Take 1 tablet by mouth Every 6 (Six) Hours As Needed for Moderate Pain (4-6). 50 tablet 0   • Multiple Vitamins-Minerals  "(CENTRUM SILVER) tablet Take 1 tablet by mouth Daily.     • pantoprazole (PROTONIX) 40 MG EC tablet Take 1 tablet by mouth Daily. 30 tablet 3   • potassium chloride (K-DUR,KLOR-CON) 10 MEQ CR tablet Take 10 mEq by mouth 2 (Two) Times a Day.     • [DISCONTINUED] aspirin 81 MG tablet Take 1 tablet by mouth Daily. 30 tablet 11   • [DISCONTINUED] PROAIR  (90 BASE) MCG/ACT inhaler INHALE 2 PUFFS QID  0     No current facility-administered medications on file prior to visit.         ALLERGIES:  No Known Allergies     Social History     Social History   • Marital status:      Spouse name: N/A   • Number of children: N/A   • Years of education: N/A     Occupational History   •  Retired     Social History Main Topics   • Smoking status: Never Smoker   • Smokeless tobacco: None   • Alcohol use No   • Drug use: Defer   • Sexual activity: Defer     Other Topics Concern   • None     Social History Narrative        Family History   Problem Relation Age of Onset   • Hypertension Other    • Heart disease Other    • Liver cancer Brother    • Ovarian cancer Daughter      diagnosed 2012   • No Known Problems Mother    • Heart disease Father    • Heart attack Father    • Cancer Maternal Grandmother    • No Known Problems Maternal Grandfather    • No Known Problems Paternal Grandmother    • No Known Problems Paternal Grandfather         Review of Systems   Constitutional: Negative for appetite change, fatigue and fever.   Respiratory: Negative for apnea, shortness of breath and wheezing.    Cardiovascular: Negative for chest pain and palpitations.   Gastrointestinal: Negative for abdominal distention, blood in stool, diarrhea, nausea and vomiting.   All other systems reviewed and are negative.       Objective     Vitals:    06/19/17 0818   BP: 156/70   Pulse: 57   Resp: 16   Temp: 98 °F (36.7 °C)   TempSrc: Oral   SpO2: 96%   Weight: 147 lb 6.4 oz (66.9 kg)   Height: 62.99\" (160 cm)  Comment: new height with out shoes on "   PainSc: 0-No pain     Current Status 6/19/2017   ECOG score 1       Physical Exam    GENERAL:  Well-developed, well-nourished in no acute distress.   SKIN:  Warm, dry without rashes, purpura or petechiae.  EYES:  Pupils equal, round and reactive to light.  EOMs intact.  Conjunctivae normal.  EARS:  Hearing intact.  NOSE:  Septum midline.  No excoriations or nasal discharge.  MOUTH:  Tongue is well-papillated; no stomatitis or ulcers.  Lips normal.  THROAT:  Oropharynx without lesions or exudates.  NECK:  Supple with good range of motion; no thyromegaly or masses, no JVD.  LYMPHATICS:  No cervical, supraclavicular, axillary or inguinal adenopathy.  CHEST:  Lungs clear to auscultation. Good airflow.  CARDIAC:  Regular rate and rhythm without murmurs, rubs or gallops. Normal S1,S2.  ABDOMEN:  Soft, nontender with no hepatosplenomegaly or masses.  EXTREMITIES:  No clubbing, cyanosis or edema.  NEUROLOGICAL:  Cranial Nerves II-XII grossly intact.  No focal neurological deficits.  PSYCHIATRIC:  Normal affect and mood.        RECENT LABS:  Hematology WBC   Date Value Ref Range Status   06/19/2017 3.63 (L) 4.00 - 10.00 10*3/mm3 Final     RBC   Date Value Ref Range Status   06/19/2017 4.61 3.90 - 5.00 10*6/mm3 Final     Hemoglobin   Date Value Ref Range Status   06/19/2017 13.8 11.5 - 14.9 g/dL Final     Hematocrit   Date Value Ref Range Status   06/19/2017 40.6 34.0 - 45.0 % Final     Platelets   Date Value Ref Range Status   06/19/2017 86 (L) 150 - 375 10*3/mm3 Final          Assessment/Plan     1.  Chronic pancytopenia, hemoglobin improved now after patient underwent coagulation of angiodysplasia of the ascending colon.  However the white count and the platelet count are still low.  I do not believe any of her medications caused her counts to be low.  Certainly we can obtain a B12, RBC folate, MIRANDA and rheumatoid factor as well as C-reactive protein and ESR.  She'll also obtain a flow cytometry.  On examination of the  peripheral smear there is decreased platelet counts, there is no evidence of platelet clumps, no evidence of immature cells including blasts.  I do not appreciate a splenomegaly on examination.  We did discuss about obtaining a bone marrow aspiration and biopsy to rule out underlying myelodysplasia.  Patient does not want to go through any bone marrow aspiration or biopsy.  She has lost significant amount of weight about 15 pounds in the last 6 months and we will obtain a CT scan of the chest abdomen pelvis to rule out an underlying malignancy.  We will see her back in 2 weeks to check on the results.    Leyla Gutiérrez MD

## 2017-06-20 LAB
ALBUMIN SERPL-MCNC: 3.6 G/DL (ref 2.9–4.4)
ALBUMIN/GLOB SERPL: 1.3 {RATIO} (ref 0.7–1.7)
ALPHA1 GLOB FLD ELPH-MCNC: 0.2 G/DL (ref 0–0.4)
ALPHA2 GLOB SERPL ELPH-MCNC: 0.7 G/DL (ref 0.4–1)
B-GLOBULIN SERPL ELPH-MCNC: 0.9 G/DL (ref 0.7–1.3)
CENTROMERE B AB SER-ACNC: <0.2 AI (ref 0–0.9)
CHROMATIN AB SERPL-ACNC: 0.2 AI (ref 0–0.9)
DSDNA AB SER-ACNC: 2 IU/ML (ref 0–9)
ENA JO1 AB SER-ACNC: <0.2 AI (ref 0–0.9)
ENA RNP AB SER-ACNC: 1.2 AI (ref 0–0.9)
ENA SCL70 AB SER-ACNC: 5 AI (ref 0–0.9)
ENA SM AB SER-ACNC: <0.2 AI (ref 0–0.9)
ENA SS-A AB SER-ACNC: <0.2 AI (ref 0–0.9)
ENA SS-B AB SER-ACNC: <0.2 AI (ref 0–0.9)
ETHNIC BACKGROUND STATED: 15.6 MIU/ML (ref 2.6–18.5)
FOLATE BLD-MCNC: 566.5 NG/ML
FOLATE RBC-MCNC: 1388 NG/ML
GAMMA GLOB SERPL ELPH-MCNC: 1.1 G/DL (ref 0.4–1.8)
GLOBULIN SER CALC-MCNC: 3 G/DL (ref 2.2–3.9)
HCT VFR BLD AUTO: 40.8 % (ref 34–46.6)
IGA SERPL-MCNC: 304 MG/DL (ref 64–422)
IGG SERPL-MCNC: 848 MG/DL (ref 700–1600)
IGM SERPL-MCNC: 132 MG/DL (ref 26–217)
INTERPRETATION SERPL IEP-IMP: ABNORMAL
KAPPA LC SERPL-MCNC: 28 MG/L (ref 3.3–19.4)
KAPPA LC/LAMBDA SER: 0.97 {RATIO} (ref 0.26–1.65)
LAMBDA LC FREE SERPL-MCNC: 28.9 MG/L (ref 5.7–26.3)
Lab: ABNORMAL
Lab: ABNORMAL
M-SPIKE: ABNORMAL G/DL
PROT SERPL-MCNC: 6.6 G/DL (ref 6–8.5)

## 2017-06-22 LAB — REF LAB TEST METHOD: NORMAL

## 2017-06-23 RX ORDER — FERROUS SULFATE 325(65) MG
TABLET ORAL
Qty: 30 TABLET | Refills: 0 | Status: SHIPPED | OUTPATIENT
Start: 2017-06-23 | End: 2017-07-16 | Stop reason: SDUPTHER

## 2017-06-23 RX ORDER — FERROUS SULFATE 325(65) MG
TABLET ORAL
Qty: 30 TABLET | Refills: 0 | Status: SHIPPED | OUTPATIENT
Start: 2017-06-23 | End: 2018-02-28 | Stop reason: SDUPTHER

## 2017-06-24 LAB — METHYLMALONATE SERPL-SCNC: 243 NMOL/L (ref 0–378)

## 2017-06-28 ENCOUNTER — HOSPITAL ENCOUNTER (OUTPATIENT)
Dept: CT IMAGING | Facility: HOSPITAL | Age: 82
Discharge: HOME | End: 2017-06-28
Attending: INTERNAL MEDICINE | Admitting: INTERNAL MEDICINE

## 2017-06-28 DIAGNOSIS — D69.6 THROMBOCYTOPENIA (HCC): ICD-10-CM

## 2017-06-28 PROCEDURE — 74177 CT ABD & PELVIS W/CONTRAST: CPT

## 2017-06-28 PROCEDURE — 0 IOPAMIDOL 61 % SOLUTION: Performed by: INTERNAL MEDICINE

## 2017-06-28 PROCEDURE — 82565 ASSAY OF CREATININE: CPT

## 2017-06-28 PROCEDURE — 0 DIATRIZOATE MEGLUMINE & SODIUM PER 1 ML: Performed by: INTERNAL MEDICINE

## 2017-06-28 PROCEDURE — 71260 CT THORAX DX C+: CPT

## 2017-06-28 RX ADMIN — IOPAMIDOL 85 ML: 612 INJECTION, SOLUTION INTRAVENOUS at 13:30

## 2017-06-28 RX ADMIN — DIATRIZOATE MEGLUMINE AND DIATRIZOATE SODIUM 30 ML: 660; 100 LIQUID ORAL; RECTAL at 12:35

## 2017-06-29 LAB — CREAT BLDA-MCNC: 0.9 MG/DL (ref 0.6–1.3)

## 2017-07-05 DIAGNOSIS — D69.6 THROMBOCYTOPENIA (HCC): ICD-10-CM

## 2017-07-05 DIAGNOSIS — D50.9 IRON DEFICIENCY ANEMIA, UNSPECIFIED IRON DEFICIENCY ANEMIA TYPE: Primary | ICD-10-CM

## 2017-07-06 ENCOUNTER — APPOINTMENT (OUTPATIENT)
Dept: OTHER | Facility: HOSPITAL | Age: 82
End: 2017-07-06

## 2017-07-06 ENCOUNTER — OFFICE VISIT (OUTPATIENT)
Dept: ONCOLOGY | Facility: CLINIC | Age: 82
End: 2017-07-06

## 2017-07-06 ENCOUNTER — APPOINTMENT (OUTPATIENT)
Dept: ONCOLOGY | Facility: CLINIC | Age: 82
End: 2017-07-06

## 2017-07-06 ENCOUNTER — LAB (OUTPATIENT)
Dept: OTHER | Facility: HOSPITAL | Age: 82
End: 2017-07-06

## 2017-07-06 VITALS
DIASTOLIC BLOOD PRESSURE: 64 MMHG | RESPIRATION RATE: 16 BRPM | HEART RATE: 64 BPM | OXYGEN SATURATION: 95 % | TEMPERATURE: 98.8 F | HEIGHT: 63 IN | BODY MASS INDEX: 26.26 KG/M2 | SYSTOLIC BLOOD PRESSURE: 132 MMHG | WEIGHT: 148.2 LBS

## 2017-07-06 DIAGNOSIS — D69.6 THROMBOCYTOPENIA (HCC): ICD-10-CM

## 2017-07-06 DIAGNOSIS — D61.818 PANCYTOPENIA (HCC): Primary | ICD-10-CM

## 2017-07-06 DIAGNOSIS — D70.9 NEUTROPENIA, UNSPECIFIED TYPE (HCC): ICD-10-CM

## 2017-07-06 LAB
BASOPHILS # BLD AUTO: 0.03 10*3/MM3 (ref 0–0.2)
BASOPHILS NFR BLD AUTO: 0.7 % (ref 0–1.5)
DEPRECATED RDW RBC AUTO: 46 FL (ref 37–54)
EOSINOPHIL # BLD AUTO: 0.16 10*3/MM3 (ref 0–0.7)
EOSINOPHIL NFR BLD AUTO: 3.9 % (ref 0.3–6.2)
ERYTHROCYTE [DISTWIDTH] IN BLOOD BY AUTOMATED COUNT: 14.4 % (ref 11.7–13)
HCT VFR BLD AUTO: 40.4 % (ref 35.6–45.5)
HGB BLD-MCNC: 14.1 G/DL (ref 11.9–15.5)
IMM GRANULOCYTES # BLD: 0.03 10*3/MM3 (ref 0–0.03)
IMM GRANULOCYTES NFR BLD: 0.7 % (ref 0–0.5)
LYMPHOCYTES # BLD AUTO: 1.09 10*3/MM3 (ref 0.9–4.8)
LYMPHOCYTES NFR BLD AUTO: 26.6 % (ref 19.6–45.3)
MCH RBC QN AUTO: 30.3 PG (ref 26.9–32)
MCHC RBC AUTO-ENTMCNC: 34.9 G/DL (ref 32.4–36.3)
MCV RBC AUTO: 86.9 FL (ref 80.5–98.2)
MONOCYTES # BLD AUTO: 0.27 10*3/MM3 (ref 0.2–1.2)
MONOCYTES NFR BLD AUTO: 6.6 % (ref 5–12)
NEUTROPHILS # BLD AUTO: 2.52 10*3/MM3 (ref 1.9–8.1)
NEUTROPHILS NFR BLD AUTO: 61.5 % (ref 42.7–76)
NRBC BLD MANUAL-RTO: 0 /100 WBC (ref 0–0)
PLATELET # BLD AUTO: 79 10*3/MM3 (ref 140–500)
PMV BLD AUTO: 12.4 FL (ref 6–12)
RBC # BLD AUTO: 4.65 10*6/MM3 (ref 3.9–5.2)
WBC NRBC COR # BLD: 4.1 10*3/MM3 (ref 4.5–10.7)

## 2017-07-06 PROCEDURE — 36415 COLL VENOUS BLD VENIPUNCTURE: CPT

## 2017-07-06 PROCEDURE — 99214 OFFICE O/P EST MOD 30 MIN: CPT | Performed by: INTERNAL MEDICINE

## 2017-07-06 PROCEDURE — 85025 COMPLETE CBC W/AUTO DIFF WBC: CPT | Performed by: INTERNAL MEDICINE

## 2017-07-06 NOTE — PROGRESS NOTES
"  Subjective .     REASONS FOR FOLLOWUP:1.  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  Patient has no history of alcohol use.    HISTORY OF PRESENT ILLNESS:  The patient is a 81 y.o. year old female who is here for follow-up with the above-mentioned history.    History of Present Illness   he shouldn't is a 81-year-old female who was referred here for thrombocytopenia.  She has leukopenia and thrombocytopenia which has been stable.  Her white count goes anywhere from 3-4.  And her platelet count is anywhere from 70-90.  Today's her level is 79.  Nasal negative except aren't.  The port is mildly elevated at 1.2.  Her anti-scleroderma-70 antibodies is elevated to 5.0 which is significantly high as the normal lumbar 0-0.9.  MMA is 243.  RBC folate is normal.  ESR is normal.  SPEP does not show any monoclonal protein.  Rheumatoid factor is elevated to 57.  IPF is mildly elevated to 9.7.  Vitamin B12 is normal.  MMA is normal.  Past Medical History:   Diagnosis Date   • Anemia    • Gallbladder stone without cholecystitis or obstruction     gallstone seen on ultrasound 2007   • GERD (gastroesophageal reflux disease)    • Health care maintenance    • Hyperlipidemia    • Hypertension    • Lumbar canal stenosis    • Lumbar radiculopathy    • MI (myocardial infarction) 12/25/2016    NON-STEMI   • Osteoarthritis    • Thrombophlebitis of superficial veins of upper extremities     LEFT       ONCOLOGIC HISTORY:  (History from previous dates can be found in the separate document.)   patient is a 81-year-old female who was referred here for evaluation of thrombus cytopenia. She has a history of hypertension and hyperlipidemia.. Her CBC her hemoglobin was 13.0 white count of 3.56 platelet of 73 on May 31, 2017. Looking back even in December 2016 her hemoglobin was 9.2 white count of 7.57\" platelet of 98. She did drop her hemoglobin to 7.9. Today her hemoglobin is back up to 13.8. She has " no complaints. She feels reasonably good and is active. She is not on any medications that can affect her platelet count. She is been on Plendil 4 years and we will check if Plendil can cause low platelet count and low white count. Rest of the drugs do not seem to affect her platelet count. She has no active bleeding. Patient underwent EGD and colonoscopy by Dr. Yeboah and apparently underwent EGD which showed chronic gastritis in December 2016. Colonoscopy showed evidence of multiple angiodysplasias in the ascending colon for which he patient underwent coagulation. Since then patient has not had GI bleeding. Patient also had a non-ST elevation myocardial infarction in March 2017. Which patient is on treatment with medications. Patient has some chronic fatigue otherwise feeling reasonably well. She has lost significant amount of weight in the last 6 months.    Chronic pancytopenia, hemoglobin improved now after patient underwent coagulation of angiodysplasia of the ascending colon. However the white count and the platelet count are still low. I do not believe any of her medications caused her counts to be low. Certainly we can obtain a B12, RBC folate, MIRANDA and rheumatoid factor as well as C-reactive protein and ESR. She'll also obtain a flow cytometry. On examination of the peripheral smear there is decreased platelet counts, there is no evidence of platelet clumps, no evidence of immature cells including blasts. I do not appreciate a splenomegaly on examination. We did discuss about obtaining a bone marrow aspiration and biopsy to rule out underlying myelodysplasia. Patient does not want to go through       Current Outpatient Prescriptions on File Prior to Visit   Medication Sig Dispense Refill   • atorvastatin (LIPITOR) 80 MG tablet Take 80 mg by mouth Daily.     • BIOTIN PO Take 1 tablet by mouth Daily.     • CALCIUM PO Take 2 tablets by mouth Daily.     • carvedilol (COREG) 3.125 MG tablet Take 3.125 mg by mouth 2  "(Two) Times a Day With Meals.     • Cholecalciferol (VITAMIN D-3 PO) Take 1 tablet by mouth Daily.     • docusate sodium (COLACE) 100 MG capsule Take 100 mg by mouth 2 (Two) Times a Day.     • felodipine (PLENDIL) 2.5 MG 24 hr tablet Take 2.5 mg by mouth Daily.     • ferrous sulfate 325 (65 FE) MG tablet TAKE 1 TABLET BY MOUTH DAILY WITH BREAKFAST 30 tablet 0   • ferrous sulfate 325 (65 FE) MG tablet TAKE 1 TABLET BY MOUTH DAILY WITH BREAKFAST 30 tablet 0   • furosemide (LASIX) 40 MG tablet Take 40 mg by mouth 2 (Two) Times a Day.     • HYDROcodone-acetaminophen (NORCO) 7.5-325 MG per tablet Take 1 tablet by mouth Every 6 (Six) Hours As Needed for Moderate Pain (4-6). 50 tablet 0   • Multiple Vitamins-Minerals (CENTRUM SILVER) tablet Take 1 tablet by mouth Daily.     • pantoprazole (PROTONIX) 40 MG EC tablet Take 1 tablet by mouth Daily. 30 tablet 3   • potassium chloride (K-DUR,KLOR-CON) 10 MEQ CR tablet Take 10 mEq by mouth 2 (Two) Times a Day.       No current facility-administered medications on file prior to visit.        ALLERGIES:   No Known Allergies    Social History     Social History   • Marital status:      Spouse name: N/A   • Number of children: N/A   • Years of education: College     Occupational History   • Secretarial Retired     Social History Main Topics   • Smoking status: Never Smoker   • Smokeless tobacco: Not on file   • Alcohol use No   • Drug use: No   • Sexual activity: Defer     Other Topics Concern   • Not on file     Social History Narrative         Cancer-related family history includes Cancer in her maternal grandmother; Liver cancer in her brother; Ovarian cancer in her daughter.     Review of Systems  A comprehensive 14 point review of systems was performed and was negative except as mentioned.    Objective      Vitals:    07/06/17 1429   BP: 132/64   Pulse: 64   Resp: 16   Temp: 98.8 °F (37.1 °C)   TempSrc: Oral   SpO2: 95%   Weight: 148 lb 3.2 oz (67.2 kg)   Height: 62.99\" " (160 cm)   PainSc: 0-No pain     Current Status 7/6/2017   ECOG score 1       Physical Exam    GENERAL:  Well-developed, well-nourished in no acute distress.   NECK:  Supple with good range of motion; no thyromegaly or masses, no JVD.  LYMPHATICS:  No cervical, supraclavicular, axillary or inguinal adenopathy.  CHEST:  Lungs clear to auscultation. Good airflow.  CARDIAC:  Regular rate and rhythm without murmurs, rubs or gallops. Normal S1,S2.  ABDOMEN:  Soft, nontender with no hepatosplenomegaly or masses.  EXTREMITIES:  No clubbing, cyanosis or edema.  NEUROLOGICAL:  Cranial Nerves II-XII grossly intact.  No focal neurological deficits.  PSYCHIATRIC:  Normal affect and mood.        RECENT LABS:  Hematology WBC   Date Value Ref Range Status   07/06/2017 4.10 (L) 4.50 - 10.70 10*3/mm3 Final     RBC   Date Value Ref Range Status   07/06/2017 4.65 3.90 - 5.20 10*6/mm3 Final     Hemoglobin   Date Value Ref Range Status   07/06/2017 14.1 11.9 - 15.5 g/dL Final     Hematocrit   Date Value Ref Range Status   07/06/2017 40.4 35.6 - 45.5 % Final     Platelets   Date Value Ref Range Status   07/06/2017 79 (L) 140 - 500 10*3/mm3 Final        Assessment/Plan   1.  Chronic pancytopenia, hemoglobin improved now after patient underwent coagulation of angiodysplasia of the ascending colon. However the white count and the platelet count are still low. I do not believe any of her medications caused her counts to be low. Looking at all labs patient has cirrhosis of the liver with splenomegaly as the cause for her pancytopenia.  Her labs are all negative except elevated rheumatoid factor and elevated and the scleroderma antibodies.  I'm unsure if there is an autoimmune component for her chronic pancytopenia.  We will refer her to both gastroenterology to evaluate the cause of her cirrhosis as she does not drink alcohol and we will also refer her to rheumatology 2 be sure there is no underlying autoimmune disease as the cause of her low  counts.    I have reviewed the CT scan with the patient.    Plan 1.  Refer to gastroenterology Dr. Peñaloza.Evaluate the cause of her cirrhosis.    2.  Ref  to rheumatology Dr. Rudolph June    3.  Follow-up in 8 weeks with me.  With CBC.    Leyla Gutiérrez MD                Cc:  Crista Casillas MD

## 2017-07-13 RX ORDER — PANTOPRAZOLE SODIUM 40 MG/1
TABLET, DELAYED RELEASE ORAL
Qty: 30 TABLET | Refills: 5 | Status: SHIPPED | OUTPATIENT
Start: 2017-07-13 | End: 2018-01-01 | Stop reason: SDUPTHER

## 2017-07-17 RX ORDER — FERROUS SULFATE 325(65) MG
TABLET ORAL
Qty: 30 TABLET | Refills: 5 | Status: SHIPPED | OUTPATIENT
Start: 2017-07-17 | End: 2017-07-24 | Stop reason: SDUPTHER

## 2017-07-24 ENCOUNTER — OFFICE VISIT (OUTPATIENT)
Dept: CARDIOLOGY | Facility: CLINIC | Age: 82
End: 2017-07-24

## 2017-07-24 VITALS
HEART RATE: 65 BPM | HEIGHT: 63 IN | WEIGHT: 146 LBS | BODY MASS INDEX: 25.87 KG/M2 | SYSTOLIC BLOOD PRESSURE: 152 MMHG | DIASTOLIC BLOOD PRESSURE: 90 MMHG

## 2017-07-24 DIAGNOSIS — I25.10 CORONARY ARTERY DISEASE INVOLVING NATIVE CORONARY ARTERY OF NATIVE HEART WITHOUT ANGINA PECTORIS: ICD-10-CM

## 2017-07-24 DIAGNOSIS — E78.5 HYPERLIPIDEMIA, UNSPECIFIED HYPERLIPIDEMIA TYPE: Primary | ICD-10-CM

## 2017-07-24 DIAGNOSIS — D69.6 THROMBOCYTOPENIA (HCC): ICD-10-CM

## 2017-07-24 DIAGNOSIS — I10 ESSENTIAL HYPERTENSION: ICD-10-CM

## 2017-07-24 PROCEDURE — 93000 ELECTROCARDIOGRAM COMPLETE: CPT | Performed by: INTERNAL MEDICINE

## 2017-07-24 PROCEDURE — 99214 OFFICE O/P EST MOD 30 MIN: CPT | Performed by: INTERNAL MEDICINE

## 2017-07-24 NOTE — PROGRESS NOTES
Pamela Durham  1935  Date of Office Visit: 01/26/2017  Encounter Provider: Desean Earl MD  Place of Service: [facility] CARDIOLOGY      CHIEF COMPLAINT:   1. Coronary artery disease  2. Iron deficiency anemia.   3. Inferior wall motion abnormality on echocardiogram.    4. Mild-to-moderate mitral regurgitation.     HISTORY OF PRESENT ILLNESS: Dr. Casillas,   I had the pleasure of seeing your patient Pamela Durham in followup secondary to her medical history of iron deficiency anemia, non-ST elevation myocardial infarction, small bilateral pleural effusions, and the finding on GI evaluation of multiple colonic angiodysplastic lesions treated with argon plasma coagulation.  The patient denied any chest pain at that time.  Secondary to her non-ST elevation myocardial infarction, she did undergo a thoracic echocardiogram which showed her to have a normal ejection fraction but an inferior wall motion defect consistent with mild hypokinesis.  She had mild-to-moderate mitral regurgitation, mild tricuspid regurgitation and a right ventricular systolic pressure of 43 mmHg.      She was sent for myocardial perfusion stress test which showed a medium-sized infarct of the inferior wall with moderate nova-infarct ischemia.  As such she was referred for coronary angiography which showed a chronic total occlusion of the circumflex along with 40% RCA stenosis.  The OM1 branch filled via left to left collaterals.  There was also a 70% LAD stenosis, however this was not intervened upon secondary to the normal perfusion in that territory but also patient being asymptomatic and having a high risk of bleeding on the lab that therapy.  Since that time she's been doing well and is not having any chest pain or significant dyspnea on exertion.  She did have imaging secondary to her weight loss showing cirrhosis which is now going to be evaluated by gastroenterology.  She follows with CBC secondary  to her thrombocytopenia.         Review of Systems   Constitution: Negative for fever, weakness and malaise/fatigue.   HENT: Negative for nosebleeds and sore throat.    Eyes: Negative for blurred vision and double vision.   Cardiovascular: Negative for chest pain, claudication, palpitations and syncope.   Respiratory: Negative for cough, shortness of breath and snoring.    Endocrine: Negative for cold intolerance, heat intolerance and polydipsia.   Skin: Negative for itching, poor wound healing and rash.   Musculoskeletal: Negative for joint pain, joint swelling, muscle weakness and myalgias.   Gastrointestinal: Negative for abdominal pain, melena, nausea and vomiting.   Neurological: Negative for light-headedness, loss of balance, seizures and vertigo.   Psychiatric/Behavioral: Negative for altered mental status and depression.          Past Medical History:   Diagnosis Date   • Anemia    • Gallbladder stone without cholecystitis or obstruction     gallstone seen on ultrasound 2007   • GERD (gastroesophageal reflux disease)    • Health care maintenance    • Hyperlipidemia    • Hypertension    • Lumbar canal stenosis    • Lumbar radiculopathy    • MI (myocardial infarction) 12/25/2016    NON-STEMI   • NSTEMI (non-ST elevated myocardial infarction)    • Osteoarthritis    • Thrombophlebitis of superficial veins of upper extremities     LEFT       The following portions of the patient's history were reviewed and updated as appropriate: Social history , Family history and Surgical history     Current Outpatient Prescriptions on File Prior to Visit   Medication Sig Dispense Refill   • atorvastatin (LIPITOR) 80 MG tablet Take 80 mg by mouth Daily.     • BIOTIN PO Take 1 tablet by mouth Daily.     • CALCIUM PO Take 2 tablets by mouth Daily.     • carvedilol (COREG) 3.125 MG tablet Take 3.125 mg by mouth 2 (Two) Times a Day With Meals.     • Cholecalciferol (VITAMIN D-3 PO) Take 1 tablet by mouth Daily.     • docusate  "sodium (COLACE) 100 MG capsule Take 100 mg by mouth 2 (Two) Times a Day.     • felodipine (PLENDIL) 2.5 MG 24 hr tablet Take 2.5 mg by mouth Daily.     • ferrous sulfate 325 (65 FE) MG tablet TAKE 1 TABLET BY MOUTH DAILY WITH BREAKFAST 30 tablet 0   • furosemide (LASIX) 40 MG tablet Take 40 mg by mouth 2 (Two) Times a Day.     • HYDROcodone-acetaminophen (NORCO) 7.5-325 MG per tablet Take 1 tablet by mouth Every 6 (Six) Hours As Needed for Moderate Pain (4-6). 50 tablet 0   • Multiple Vitamins-Minerals (CENTRUM SILVER) tablet Take 1 tablet by mouth Daily.     • pantoprazole (PROTONIX) 40 MG EC tablet TAKE 1 TABLET BY MOUTH DAILY 30 tablet 5   • potassium chloride (K-DUR,KLOR-CON) 10 MEQ CR tablet Take 10 mEq by mouth 2 (Two) Times a Day.     • [DISCONTINUED] ferrous sulfate 325 (65 FE) MG tablet TAKE 1 TABLET BY MOUTH DAILY WITH BREAKFAST 30 tablet 5     No current facility-administered medications on file prior to visit.        No Known Allergies    Vitals:    07/24/17 1030   BP: 152/90   Pulse: 65   Weight: 146 lb (66.2 kg)   Height: 63\" (160 cm)     Physical Exam   Constitutional: She is oriented to person, place, and time. She appears well-developed and well-nourished.   HENT:   Head: Normocephalic and atraumatic.   Eyes: Conjunctivae and EOM are normal. No scleral icterus.   Neck: Normal range of motion. Neck supple. Normal carotid pulses, no hepatojugular reflux and no JVD present. Carotid bruit is not present. No tracheal deviation present. No thyromegaly present.   Cardiovascular: Normal rate and regular rhythm.  Exam reveals no gallop and no friction rub.    No murmur heard.  Pulses:       Carotid pulses are 2+ on the right side, and 2+ on the left side.       Radial pulses are 2+ on the right side, and 2+ on the left side.        Femoral pulses are 2+ on the right side, and 2+ on the left side.       Dorsalis pedis pulses are 2+ on the right side, and 2+ on the left side.        Posterior tibial pulses are " 2+ on the right side, and 2+ on the left side.   Pulmonary/Chest: Breath sounds normal. No respiratory distress. She has no decreased breath sounds. She has no wheezes. She has no rhonchi. She has no rales. She exhibits no tenderness.   Abdominal: Soft. Bowel sounds are normal. She exhibits no distension. There is no tenderness. There is no rebound.   Musculoskeletal: She exhibits no edema or deformity.   Neurological: She is alert and oriented to person, place, and time. She has normal strength. No sensory deficit.   Skin: No rash noted. No erythema.   Psychiatric: She has a normal mood and affect. Her behavior is normal.           No components found for: CBC  No results found for: CMP  No components found for: LIPID  No results found for: BMP      ECG 12 Lead  Date/Time: 7/24/2017 11:10 AM  Performed by: ERWIN SWEENEY  Authorized by: ERWIN SWEENEY   Comparison: compared with previous ECG from 1/26/2017  Similar to previous ECG  Rhythm: sinus rhythm  Rate: normal  Conduction: conduction normal  ST Segments: ST segments normal  QRS axis: normal  Clinical impression: non-specific ECG  Comments: Nonspecific ST-T wave abnormalities diffuse leads             3/1/17  Conclusions:   1. Left main:  severe distal calcification.  Discrete 40% distal stenosis.    2. LAD: severe proximal calcification.  Discrete 70% proximal stenosis.    3. LCX:chronic total occlusion OM1.  Fills via left to left collaterals.    4. RCA: discrete 40% ostial stenosis.    5. Mild inferolateral hypokinesis.  Normal ejection fraction.          Recommendations: Patient has previously had a an infarction secondary to the obtuse marginal branch.  She also has disease of the proximal LAD.  There is severe calcification and this would require rotational atherectomy to fix.  As she is asymptomatic at this point in time medical management will be offered initially.  If she has dyspnea or chest discomfort I would schedule her initially for  a rotational atherectomy and PCI of the proximal LAD.     DISCUSSION/SUMMARY   81-year-old female with a medical history as documented above including non-ST elevation myocardial infarction in the setting of acute blood loss anemia and colonic angiodysplastic lesions requiring argon laser therapy, wall motion abnormality of the inferior wall with a preserved ejection fraction, mitral and tricuspid regurgitation who presents back for followup.  She underwent stress test showing inferior infarct with nova-infarct ischemia.  She has a chronic total occlusion of the obtuse marginal branch which fills via left to left collaterals.  She also had LAD disease but no evidence of ischemia in that territory and was asymptomatic.  She is receiving medical management.  No changes since her last visit.  She is doing very well.      1. Coronary artery disease.  No angina.  Medical management   -Continue atorvastatin at current dose.   - continue aspirin 81 mg daily.    2. Essential hypertension; Not at goal.  I have encouraged the patient to check her blood pressure over the next couple of weeks and give me a call.  She may need an increased dose of felodipine therapy  3.  Hyperlipidemia: As above.  Continue atorvastatin at current dose     -I have encouraged the patient to come off of the Lasix therapy.  I don't think she has a need for it at this time and her ejection fraction is previously been documented to be normal.    I will plan to see the patient back in the office in six months or earlier with problems.      Coronary Artery Disease  Assessment  • The patient has no angina    Plan  • Lifestyle modifications discussed include adhering to a heart healthy diet, avoidance of tobacco products, maintenance of a healthy weight, regular exercise and regular monitoring of cholesterol and blood pressure    Subjective - Objective  • Current antiplatelet therapy includes aspirin 81 mg

## 2017-08-31 ENCOUNTER — OFFICE VISIT (OUTPATIENT)
Dept: ONCOLOGY | Facility: CLINIC | Age: 82
End: 2017-08-31

## 2017-08-31 ENCOUNTER — LAB (OUTPATIENT)
Dept: OTHER | Facility: HOSPITAL | Age: 82
End: 2017-08-31

## 2017-08-31 VITALS
BODY MASS INDEX: 25.69 KG/M2 | WEIGHT: 145 LBS | DIASTOLIC BLOOD PRESSURE: 67 MMHG | TEMPERATURE: 98.3 F | SYSTOLIC BLOOD PRESSURE: 138 MMHG | OXYGEN SATURATION: 96 % | HEART RATE: 63 BPM | RESPIRATION RATE: 16 BRPM | HEIGHT: 63 IN

## 2017-08-31 DIAGNOSIS — D70.9 NEUTROPENIA, UNSPECIFIED TYPE (HCC): ICD-10-CM

## 2017-08-31 DIAGNOSIS — D61.818 PANCYTOPENIA (HCC): ICD-10-CM

## 2017-08-31 DIAGNOSIS — D69.6 THROMBOCYTOPENIA (HCC): Primary | ICD-10-CM

## 2017-08-31 LAB
ALBUMIN SERPL-MCNC: 4 G/DL (ref 3.5–5.2)
ALBUMIN/GLOB SERPL: 1.4 G/DL
ALP SERPL-CCNC: 132 U/L (ref 39–117)
ALT SERPL W P-5'-P-CCNC: 42 U/L (ref 1–33)
ANION GAP SERPL CALCULATED.3IONS-SCNC: 12.2 MMOL/L
AST SERPL-CCNC: 55 U/L (ref 1–32)
BASOPHILS # BLD AUTO: 0.01 10*3/MM3 (ref 0–0.2)
BASOPHILS NFR BLD AUTO: 0.3 % (ref 0–1.5)
BILIRUB SERPL-MCNC: 0.6 MG/DL (ref 0.1–1.2)
BUN BLD-MCNC: 14 MG/DL (ref 8–23)
BUN/CREAT SERPL: 16.7 (ref 7–25)
CALCIUM SPEC-SCNC: 9.7 MG/DL (ref 8.6–10.5)
CHLORIDE SERPL-SCNC: 103 MMOL/L (ref 98–107)
CO2 SERPL-SCNC: 24.8 MMOL/L (ref 22–29)
CREAT BLD-MCNC: 0.84 MG/DL (ref 0.57–1)
DEPRECATED RDW RBC AUTO: 45.6 FL (ref 37–54)
EOSINOPHIL # BLD AUTO: 0.13 10*3/MM3 (ref 0–0.7)
EOSINOPHIL NFR BLD AUTO: 3.4 % (ref 0.3–6.2)
ERYTHROCYTE [DISTWIDTH] IN BLOOD BY AUTOMATED COUNT: 14.3 % (ref 11.7–13)
GFR SERPL CREATININE-BSD FRML MDRD: 65 ML/MIN/1.73
GLOBULIN UR ELPH-MCNC: 2.8 GM/DL
GLUCOSE BLD-MCNC: 122 MG/DL (ref 65–99)
HCT VFR BLD AUTO: 40 % (ref 35.6–45.5)
HGB BLD-MCNC: 13.7 G/DL (ref 11.9–15.5)
IMM GRANULOCYTES # BLD: 0 10*3/MM3 (ref 0–0.03)
IMM GRANULOCYTES NFR BLD: 0 % (ref 0–0.5)
LYMPHOCYTES # BLD AUTO: 1.11 10*3/MM3 (ref 0.9–4.8)
LYMPHOCYTES NFR BLD AUTO: 28.9 % (ref 19.6–45.3)
MCH RBC QN AUTO: 30 PG (ref 26.9–32)
MCHC RBC AUTO-ENTMCNC: 34.3 G/DL (ref 32.4–36.3)
MCV RBC AUTO: 87.5 FL (ref 80.5–98.2)
MONOCYTES # BLD AUTO: 0.34 10*3/MM3 (ref 0.2–1.2)
MONOCYTES NFR BLD AUTO: 8.9 % (ref 5–12)
NEUTROPHILS # BLD AUTO: 2.25 10*3/MM3 (ref 1.9–8.1)
NEUTROPHILS NFR BLD AUTO: 58.5 % (ref 42.7–76)
NRBC BLD MANUAL-RTO: 0 /100 WBC (ref 0–0)
PLATELET # BLD AUTO: 86 10*3/MM3 (ref 140–500)
PMV BLD AUTO: 11.5 FL (ref 6–12)
POTASSIUM BLD-SCNC: 4.3 MMOL/L (ref 3.5–5.2)
PROT SERPL-MCNC: 6.8 G/DL (ref 6–8.5)
RBC # BLD AUTO: 4.57 10*6/MM3 (ref 3.9–5.2)
SODIUM BLD-SCNC: 140 MMOL/L (ref 136–145)
WBC NRBC COR # BLD: 3.84 10*3/MM3 (ref 4.5–10.7)

## 2017-08-31 PROCEDURE — 80053 COMPREHEN METABOLIC PANEL: CPT | Performed by: INTERNAL MEDICINE

## 2017-08-31 PROCEDURE — 99213 OFFICE O/P EST LOW 20 MIN: CPT | Performed by: INTERNAL MEDICINE

## 2017-08-31 PROCEDURE — 36415 COLL VENOUS BLD VENIPUNCTURE: CPT

## 2017-08-31 PROCEDURE — 85025 COMPLETE CBC W/AUTO DIFF WBC: CPT | Performed by: INTERNAL MEDICINE

## 2017-09-05 ENCOUNTER — APPOINTMENT (OUTPATIENT)
Dept: WOMENS IMAGING | Facility: HOSPITAL | Age: 82
End: 2017-09-05

## 2017-09-05 PROCEDURE — G0202 SCR MAMMO BI INCL CAD: HCPCS | Performed by: RADIOLOGY

## 2017-09-05 PROCEDURE — 77063 BREAST TOMOSYNTHESIS BI: CPT | Performed by: RADIOLOGY

## 2017-09-05 PROCEDURE — MDREVIEWSP: Performed by: RADIOLOGY

## 2017-09-07 ENCOUNTER — LAB (OUTPATIENT)
Dept: LAB | Facility: HOSPITAL | Age: 82
End: 2017-09-07
Attending: INTERNAL MEDICINE

## 2017-09-07 ENCOUNTER — TRANSCRIBE ORDERS (OUTPATIENT)
Dept: ADMINISTRATIVE | Facility: HOSPITAL | Age: 82
End: 2017-09-07

## 2017-09-07 ENCOUNTER — OFFICE (INPATIENT)
Dept: URBAN - METROPOLITAN AREA OTHER 6 | Facility: OTHER | Age: 82
End: 2017-09-07

## 2017-09-07 VITALS
SYSTOLIC BLOOD PRESSURE: 140 MMHG | WEIGHT: 147 LBS | DIASTOLIC BLOOD PRESSURE: 60 MMHG | HEIGHT: 63 IN | HEART RATE: 56 BPM

## 2017-09-07 DIAGNOSIS — K74.69 OTHER CIRRHOSIS OF LIVER: ICD-10-CM

## 2017-09-07 DIAGNOSIS — K55.20 ANGIODYSPLASIA OF THE COLON: ICD-10-CM

## 2017-09-07 DIAGNOSIS — I25.10 CORONARY ARTERY DISEASE INVOLVING NATIVE CORONARY ARTERY OF NATIVE HEART WITHOUT ANGINA PECTORIS: ICD-10-CM

## 2017-09-07 DIAGNOSIS — K55.20 ANGIODYSPLASIA OF COLON WITHOUT HEMORRHAGE: ICD-10-CM

## 2017-09-07 DIAGNOSIS — R93.5 ABNORMAL CT OF THE ABDOMEN: ICD-10-CM

## 2017-09-07 DIAGNOSIS — R53.1 GENERAL WEAKNESS: ICD-10-CM

## 2017-09-07 DIAGNOSIS — K74.69 FLORID CIRRHOSIS (HCC): Primary | ICD-10-CM

## 2017-09-07 LAB
ALBUMIN SERPL-MCNC: 3.7 G/DL (ref 3.5–5.2)
ALBUMIN/GLOB SERPL: 1.1 G/DL
ALP SERPL-CCNC: 139 U/L (ref 39–117)
ALT SERPL W P-5'-P-CCNC: 39 U/L (ref 1–33)
ANION GAP SERPL CALCULATED.3IONS-SCNC: 10.6 MMOL/L
AST SERPL-CCNC: 50 U/L (ref 1–32)
BILIRUB SERPL-MCNC: 0.8 MG/DL (ref 0.1–1.2)
BUN BLD-MCNC: 15 MG/DL (ref 8–23)
BUN/CREAT SERPL: 17 (ref 7–25)
CALCIUM SPEC-SCNC: 9.5 MG/DL (ref 8.6–10.5)
CHLORIDE SERPL-SCNC: 105 MMOL/L (ref 98–107)
CO2 SERPL-SCNC: 27.4 MMOL/L (ref 22–29)
CREAT BLD-MCNC: 0.88 MG/DL (ref 0.57–1)
DACRYOCYTES BLD QL SMEAR: NORMAL
DEPRECATED RDW RBC AUTO: 48.4 FL (ref 37–54)
EOSINOPHIL # BLD MANUAL: 0.14 10*3/MM3 (ref 0–0.7)
EOSINOPHIL NFR BLD MANUAL: 4 % (ref 0.3–6.2)
ERYTHROCYTE [DISTWIDTH] IN BLOOD BY AUTOMATED COUNT: 14.7 % (ref 11.7–13)
FERRITIN SERPL-MCNC: 200.5 NG/ML (ref 13–150)
GFR SERPL CREATININE-BSD FRML MDRD: 62 ML/MIN/1.73
GLOBULIN UR ELPH-MCNC: 3.3 GM/DL
GLUCOSE BLD-MCNC: 90 MG/DL (ref 65–99)
HAV IGM SERPL QL IA: NORMAL
HBV CORE IGM SERPL QL IA: NORMAL
HBV SURFACE AG SERPL QL IA: NORMAL
HCT VFR BLD AUTO: 39.3 % (ref 35.6–45.5)
HCV AB SER DONR QL: NORMAL
HGB BLD-MCNC: 13.3 G/DL (ref 11.9–15.5)
IRON 24H UR-MRATE: 71 MCG/DL (ref 37–145)
IRON SATN MFR SERPL: 24 % (ref 20–50)
LYMPHOCYTES # BLD MANUAL: 0.96 10*3/MM3 (ref 0.9–4.8)
LYMPHOCYTES NFR BLD MANUAL: 11 % (ref 5–12)
LYMPHOCYTES NFR BLD MANUAL: 27 % (ref 19.6–45.3)
MCH RBC QN AUTO: 30.2 PG (ref 26.9–32)
MCHC RBC AUTO-ENTMCNC: 33.8 G/DL (ref 32.4–36.3)
MCV RBC AUTO: 89.1 FL (ref 80.5–98.2)
MONOCYTES # BLD AUTO: 0.39 10*3/MM3 (ref 0.2–1.2)
NEUTROPHILS # BLD AUTO: 2.05 10*3/MM3 (ref 1.9–8.1)
NEUTROPHILS NFR BLD MANUAL: 58 % (ref 42.7–76)
OVALOCYTES BLD QL SMEAR: NORMAL
PLAT MORPH BLD: NORMAL
PLATELET # BLD AUTO: 80 10*3/MM3 (ref 140–500)
PMV BLD AUTO: 11.6 FL (ref 6–12)
POTASSIUM BLD-SCNC: 4 MMOL/L (ref 3.5–5.2)
PROT SERPL-MCNC: 7 G/DL (ref 6–8.5)
RBC # BLD AUTO: 4.41 10*6/MM3 (ref 3.9–5.2)
SODIUM BLD-SCNC: 143 MMOL/L (ref 136–145)
TIBC SERPL-MCNC: 292 MCG/DL
TRANSFERRIN SERPL-MCNC: 196 MG/DL (ref 200–360)
WBC MORPH BLD: NORMAL
WBC NRBC COR # BLD: 3.54 10*3/MM3 (ref 4.5–10.7)

## 2017-09-07 PROCEDURE — 82105 ALPHA-FETOPROTEIN SERUM: CPT

## 2017-09-07 PROCEDURE — 82977 ASSAY OF GGT: CPT

## 2017-09-07 PROCEDURE — 99212 OFFICE O/P EST SF 10 MIN: CPT | Performed by: INTERNAL MEDICINE

## 2017-09-07 PROCEDURE — 84460 ALANINE AMINO (ALT) (SGPT): CPT

## 2017-09-07 PROCEDURE — 80053 COMPREHEN METABOLIC PANEL: CPT

## 2017-09-07 PROCEDURE — 84466 ASSAY OF TRANSFERRIN: CPT

## 2017-09-07 PROCEDURE — 83516 IMMUNOASSAY NONANTIBODY: CPT

## 2017-09-07 PROCEDURE — 83883 ASSAY NEPHELOMETRY NOT SPEC: CPT

## 2017-09-07 PROCEDURE — 84450 TRANSFERASE (AST) (SGOT): CPT

## 2017-09-07 PROCEDURE — 82465 ASSAY BLD/SERUM CHOLESTEROL: CPT

## 2017-09-07 PROCEDURE — 80074 ACUTE HEPATITIS PANEL: CPT

## 2017-09-07 PROCEDURE — 83010 ASSAY OF HAPTOGLOBIN QUANT: CPT

## 2017-09-07 PROCEDURE — 82728 ASSAY OF FERRITIN: CPT

## 2017-09-07 PROCEDURE — 82247 BILIRUBIN TOTAL: CPT

## 2017-09-07 PROCEDURE — 36415 COLL VENOUS BLD VENIPUNCTURE: CPT

## 2017-09-07 PROCEDURE — 84478 ASSAY OF TRIGLYCERIDES: CPT

## 2017-09-07 PROCEDURE — 82947 ASSAY GLUCOSE BLOOD QUANT: CPT

## 2017-09-07 PROCEDURE — 85007 BL SMEAR W/DIFF WBC COUNT: CPT

## 2017-09-07 PROCEDURE — 83540 ASSAY OF IRON: CPT

## 2017-09-07 PROCEDURE — 85027 COMPLETE CBC AUTOMATED: CPT

## 2017-09-07 PROCEDURE — 82390 ASSAY OF CERULOPLASMIN: CPT

## 2017-09-08 PROBLEM — R93.5 ABNORMAL CT OF THE ABDOMEN: Status: ACTIVE | Noted: 2017-09-08

## 2017-09-08 PROBLEM — R53.1 GENERAL WEAKNESS: Status: ACTIVE | Noted: 2017-09-08

## 2017-09-08 PROCEDURE — 83883 ASSAY NEPHELOMETRY NOT SPEC: CPT

## 2017-09-08 PROCEDURE — 84460 ALANINE AMINO (ALT) (SGPT): CPT

## 2017-09-08 PROCEDURE — 82247 BILIRUBIN TOTAL: CPT

## 2017-09-08 PROCEDURE — 82465 ASSAY BLD/SERUM CHOLESTEROL: CPT

## 2017-09-08 PROCEDURE — 82977 ASSAY OF GGT: CPT

## 2017-09-08 PROCEDURE — 84450 TRANSFERASE (AST) (SGOT): CPT

## 2017-09-08 PROCEDURE — 83010 ASSAY OF HAPTOGLOBIN QUANT: CPT

## 2017-09-08 PROCEDURE — 82947 ASSAY GLUCOSE BLOOD QUANT: CPT

## 2017-09-08 PROCEDURE — 84478 ASSAY OF TRIGLYCERIDES: CPT

## 2017-09-09 LAB
AFP-TM SERPL-MCNC: 4.7 NG/ML (ref 0–8.3)
CERULOPLASMIN SERPL-MCNC: 26.3 MG/DL (ref 19–39)

## 2017-09-11 LAB — ACTIN IGG SERPL-ACNC: 9 UNITS (ref 0–19)

## 2017-09-12 LAB
A2 MACROGLOB SERPL-MCNC: 408 MG/DL (ref 110–276)
ALT SERPL W P-5'-P-CCNC: 44 IU/L (ref 0–40)
APO A-I SERPL-MCNC: 123 MG/DL (ref 116–209)
AST SERPL W P-5'-P-CCNC: 55 IU/L (ref 0–40)
BILIRUB SERPL-MCNC: 0.2 MG/DL (ref 0–1.2)
CHOLEST SERPL-MCNC: 120 MG/DL (ref 100–199)
FIBROSIS SCORING:: ABNORMAL
FIBROSIS STAGE SERPL QL: ABNORMAL
GGT SERPL-CCNC: 288 IU/L (ref 0–60)
GLUCOSE SERPL-MCNC: 82 MG/DL (ref 65–99)
HAPTOGLOB SERPL-MCNC: 111 MG/DL (ref 34–200)
INTERPRETATIONS: (REFERENCE): ABNORMAL
LABORATORY COMMENT REPORT: ABNORMAL
LIMITATIONS: (REFERENCE): ABNORMAL
LIVER FIBR SCORE SERPL CALC.FIBROSURE: 0.8 (ref 0–0.21)
NASH GRADE (REFERENCE): ABNORMAL
NASH SCORE (REFERENCE): 0.25
NASH SCORING (REFERENCE): ABNORMAL
STEATOSIS GRADE (REFERENCE): ABNORMAL
STEATOSIS GRADING (REFERENCE): ABNORMAL
STEATOSIS SCORE (REFERENCE): 0.76 (ref 0–0.3)
TRIGL SERPL-MCNC: 129 MG/DL (ref 0–149)
WEIGHT: (REFERENCE): 145 LBS

## 2017-10-06 ENCOUNTER — OFFICE VISIT (OUTPATIENT)
Dept: INTERNAL MEDICINE | Facility: CLINIC | Age: 82
End: 2017-10-06

## 2017-10-06 VITALS
HEIGHT: 63 IN | WEIGHT: 144 LBS | DIASTOLIC BLOOD PRESSURE: 58 MMHG | SYSTOLIC BLOOD PRESSURE: 116 MMHG | BODY MASS INDEX: 25.52 KG/M2

## 2017-10-06 DIAGNOSIS — Z23 NEED FOR VACCINATION: ICD-10-CM

## 2017-10-06 DIAGNOSIS — Z00.00 MEDICARE ANNUAL WELLNESS VISIT, SUBSEQUENT: Primary | ICD-10-CM

## 2017-10-06 DIAGNOSIS — R73.01 ELEVATED FASTING GLUCOSE: ICD-10-CM

## 2017-10-06 DIAGNOSIS — Z13.820 OSTEOPOROSIS SCREENING: ICD-10-CM

## 2017-10-06 DIAGNOSIS — E78.5 HYPERLIPIDEMIA, UNSPECIFIED HYPERLIPIDEMIA TYPE: ICD-10-CM

## 2017-10-06 DIAGNOSIS — I10 ESSENTIAL HYPERTENSION: ICD-10-CM

## 2017-10-06 LAB
CHOLEST SERPL-MCNC: 116 MG/DL (ref 0–200)
HBA1C MFR BLD: 5.87 % (ref 4.8–5.6)
HDLC SERPL-MCNC: 40 MG/DL (ref 40–60)
LDLC SERPL CALC-MCNC: 51 MG/DL (ref 0–100)
LDLC/HDLC SERPL: 1.28 {RATIO}
TRIGL SERPL-MCNC: 125 MG/DL (ref 0–150)
VLDLC SERPL-MCNC: 25 MG/DL (ref 5–40)

## 2017-10-06 PROCEDURE — 90670 PCV13 VACCINE IM: CPT | Performed by: INTERNAL MEDICINE

## 2017-10-06 PROCEDURE — G0009 ADMIN PNEUMOCOCCAL VACCINE: HCPCS | Performed by: INTERNAL MEDICINE

## 2017-10-06 PROCEDURE — 83036 HEMOGLOBIN GLYCOSYLATED A1C: CPT | Performed by: INTERNAL MEDICINE

## 2017-10-06 PROCEDURE — G0439 PPPS, SUBSEQ VISIT: HCPCS | Performed by: INTERNAL MEDICINE

## 2017-10-06 PROCEDURE — 80061 LIPID PANEL: CPT | Performed by: INTERNAL MEDICINE

## 2017-10-06 PROCEDURE — 99213 OFFICE O/P EST LOW 20 MIN: CPT | Performed by: INTERNAL MEDICINE

## 2017-10-06 NOTE — PROGRESS NOTES
QUICK REFERENCE INFORMATION:  The ABCs of the Annual Wellness Visit    Subsequent Medicare Wellness Visit    HEALTH RISK ASSESSMENT    1935    Recent Hospitalizations:  Recently treated at the following:  [facility].        Current Medical Providers:  Patient Care Team:  Crista Casillas MD as PCP - General (Internal Medicine)  Crista Casillas MD as PCP - Claims Attributed  Crista Casillas MD as Referring Physician (Internal Medicine)  Leyla Gutiérrez MD as Consulting Physician (Hematology and Oncology)  Desean Earl MD as Consulting Physician (Cardiology)        Smoking Status:  History   Smoking Status   • Never Smoker   Smokeless Tobacco   • Not on file       Alcohol Consumption:  History   Alcohol Use No       Depression Screen:   PHQ-2/PHQ-9 Depression Screening 10/6/2017   Little interest or pleasure in doing things 0   Feeling down, depressed, or hopeless 0   Trouble falling or staying asleep, or sleeping too much 1   Feeling tired or having little energy 0   Poor appetite or overeating 0   Feeling bad about yourself - or that you are a failure or have let yourself or your family down 0   Trouble concentrating on things, such as reading the newspaper or watching television 0   Moving or speaking so slowly that other people could have noticed. Or the opposite - being so fidgety or restless that you have been moving around a lot more than usual 0   Thoughts that you would be better off dead, or of hurting yourself in some way 0   Total Score 1       Health Habits and Functional and Cognitive Screening:  Functional & Cognitive Status 10/6/2017   Do you have difficulty preparing food and eating? No   Do you have difficulty bathing yourself? No   Do you have difficulty getting dressed? No   Do you have difficulty using the toilet? No   Do you have difficulty moving around from place to place? No   In the past year have you fallen or experienced a near fall? No   Do you need help using the  phone?  No   Are you deaf or do you have serious difficulty hearing?  No   Do you need help with transportation? No   Do you need help shopping? No   Do you need help preparing meals?  No   Do you need help with housework?  No   Do you need help with laundry? No   Do you need help taking your medications? No   Do you need help managing money? No   Do you have difficulty concentrating, remembering or making decisions? No       Health Habits  Current Diet: Well Balanced Diet  Dental Exam: Up to date  Eye Exam: Up to date  Exercise (times per week): 0 times per week  Current Exercise Activities Include: None      Does the patient have evidence of cognitive impairment? No    Aspirin use counseling: Contraindicated from taking ASA      Recent Lab Results:  CMP:  Lab Results   Component Value Date     (H) 07/28/2016    BUN 15 09/07/2017    CREATININE 0.88 09/07/2017    EGFRIFNONA 62 09/07/2017    EGFRIFAFRI 57 (L) 07/28/2016    BCR 17.0 09/07/2017     09/07/2017    K 4.0 09/07/2017    CO2 27.4 09/07/2017    CALCIUM 9.5 09/07/2017    PROTENTOTREF 6.6 06/19/2017    ALBUMIN 3.70 09/07/2017    LABGLOBREF 3.0 06/19/2017    LABIL2 1.1 09/07/2017    BILITOT 0.8 09/07/2017    ALKPHOS 139 (H) 09/07/2017    AST 50 (H) 09/07/2017    ALT 39 (H) 09/07/2017     Lipid Panel:  Lab Results   Component Value Date    CHOL 120 05/31/2017    TRIG 129 09/07/2017    HDL 39 (L) 05/31/2017    VLDL 21.8 05/31/2017    LDLCALC 59 05/31/2017    LDLHDL 1.52 05/31/2017     HbA1c:  Lab Results   Component Value Date    HGBA1C 5.9 05/31/2017       Visual Acuity:  No exam data present    Age-appropriate Screening Schedule:  Refer to the list below for future screening recommendations based on patient's age, sex and/or medical conditions. Orders for these recommended tests are listed in the plan section. The patient has been provided with a written plan.    Health Maintenance   Topic Date Due   • TDAP/TD VACCINES (1 - Tdap) 11/07/1955   •  PNEUMOCOCCAL VACCINES (65+ LOW/MEDIUM RISK) (1 of 2 - PCV13) 11/07/2001   • ZOSTER VACCINE  07/28/2016   • INFLUENZA VACCINE  08/01/2017   • LIPID PANEL  05/31/2018   • MAMMOGRAM  09/05/2019        Subjective   History of Present Illness    Pamela Durham is a 81 y.o. female who presents for an Subsequent Wellness Visit.    The following portions of the patient's history were reviewed and updated as appropriate: allergies, current medications, past family history, past medical history, past social history, past surgical history and problem list.    Outpatient Medications Prior to Visit   Medication Sig Dispense Refill   • atorvastatin (LIPITOR) 80 MG tablet Take 80 mg by mouth Daily.     • BIOTIN PO Take 1 tablet by mouth Daily.     • carvedilol (COREG) 3.125 MG tablet Take 3.125 mg by mouth 2 (Two) Times a Day With Meals.     • Cholecalciferol (VITAMIN D-3 PO) Take 1 tablet by mouth Daily.     • docusate sodium (COLACE) 100 MG capsule Take 100 mg by mouth 2 (Two) Times a Day.     • felodipine (PLENDIL) 2.5 MG 24 hr tablet Take 2.5 mg by mouth Daily.     • ferrous sulfate 325 (65 FE) MG tablet TAKE 1 TABLET BY MOUTH DAILY WITH BREAKFAST 30 tablet 0   • Multiple Vitamins-Minerals (CENTRUM SILVER) tablet Take 1 tablet by mouth Daily.     • pantoprazole (PROTONIX) 40 MG EC tablet TAKE 1 TABLET BY MOUTH DAILY 30 tablet 5   • aspirin 81 MG tablet Take 1 tablet by mouth Daily. 30 tablet 11   • CALCIUM PO Take 2 tablets by mouth Daily.     • HYDROcodone-acetaminophen (NORCO) 7.5-325 MG per tablet Take 1 tablet by mouth Every 6 (Six) Hours As Needed for Moderate Pain (4-6). 50 tablet 0     No facility-administered medications prior to visit.        Patient Active Problem List   Diagnosis   • History of total hip arthroplasty   • Lumbar canal stenosis   • Lumbar radiculopathy   • Essential hypertension   • Hyperlipemia   • Elevated fasting glucose   • NSTEMI (non-ST elevated myocardial infarction)   • Iron deficiency anemia  "  • General weakness   • Lung nodule   • Abnormal CT of the abdomen   • Superficial thrombophlebitis of left upper extremity   • Thrombocytopenia   • Leukopenia   • Coronary artery disease involving native coronary artery of native heart without angina pectoris   • Abnormal CT of the abdomen   • General weakness   • Abnormal CT of the abdomen   • Abnormal CT of the abdomen   • General weakness       Advance Care Planning:  She does not have advanced directeve.  She has the paperwork for it    Identification of Risk Factors:  Risk factors include: No risk factors apply.  She does use a walker but denies increased risk for falls.    Review of Systems    Compared to one year ago, the patient feels her physical health is the same.  Compared to one year ago, the patient feels her mental health is the same.    Objective     Physical Exam    Vitals:    10/06/17 0848   BP: 116/58   Weight: 144 lb (65.3 kg)   Height: 62.9\" (159.8 cm)       Body mass index is 25.59 kg/(m^2).  Discussed the patient's BMI with her. The BMI is in the acceptable range.    Assessment/Plan   Patient Self-Management and Personalized Health Advice  The patient has been provided with information about: diet, exercise, fall prevention and designing advance directives and preventive services including:   · She is up-to-date on colonoscopy, mammogram.  She is due for bone density, due for flu shot today.  Due for Prevnar today.  She will follow recommendations from her gynecologist in regard to pelvic exam and Pap smears..    Visit Diagnoses:  No diagnosis found.    No orders of the defined types were placed in this encounter.      Outpatient Encounter Prescriptions as of 10/6/2017   Medication Sig Dispense Refill   • atorvastatin (LIPITOR) 80 MG tablet Take 80 mg by mouth Daily.     • BIOTIN PO Take 1 tablet by mouth Daily.     • carvedilol (COREG) 3.125 MG tablet Take 3.125 mg by mouth 2 (Two) Times a Day With Meals.     • Cholecalciferol (VITAMIN D-3 " PO) Take 1 tablet by mouth Daily.     • docusate sodium (COLACE) 100 MG capsule Take 100 mg by mouth 2 (Two) Times a Day.     • felodipine (PLENDIL) 2.5 MG 24 hr tablet Take 2.5 mg by mouth Daily.     • ferrous sulfate 325 (65 FE) MG tablet TAKE 1 TABLET BY MOUTH DAILY WITH BREAKFAST 30 tablet 0   • Multiple Vitamins-Minerals (CENTRUM SILVER) tablet Take 1 tablet by mouth Daily.     • pantoprazole (PROTONIX) 40 MG EC tablet TAKE 1 TABLET BY MOUTH DAILY 30 tablet 5   • [DISCONTINUED] aspirin 81 MG tablet Take 1 tablet by mouth Daily. 30 tablet 11   • CALCIUM PO Take 2 tablets by mouth Daily.     • HYDROcodone-acetaminophen (NORCO) 7.5-325 MG per tablet Take 1 tablet by mouth Every 6 (Six) Hours As Needed for Moderate Pain (4-6). 50 tablet 0     No facility-administered encounter medications on file as of 10/6/2017.        Reviewed use of high risk medication in the elderly: yes  Reviewed for potential of harmful drug interactions in the elderly: yes    Follow Up:  No Follow-up on file.     An After Visit Summary and PPPS with all of these plans were given to the patient.

## 2017-10-06 NOTE — PATIENT INSTRUCTIONS
Medicare Wellness  Personal Prevention Plan of Service     Date of Office Visit:  10/06/2017  Encounter Provider:  Crista Casillas MD  Place of Service:  [facility] INTERNAL MEDICINE  Patient Name: Pamela Durham  :  1935    As part of the Medicare Wellness portion of your visit today, we are providing you with this personalized preventive plan of services (PPPS). This plan is based upon recommendations of the [place] Preventive Services Task Force (USPSTF) and the Advisory Committee on Immunization Practices (ACIP).    This lists the preventive care services that should be considered, and provides dates of when you are due. Items listed as completed are up-to-date and do not require any further intervention.    Health Maintenance   Topic Date Due   • TDAP/TD VACCINES (1 - Tdap) 1955   • PNEUMOCOCCAL VACCINES (65+ LOW/MEDIUM RISK) (1 of 2 - PCV13) 2001   • MEDICARE ANNUAL WELLNESS  2016   • ZOSTER VACCINE  2016   • INFLUENZA VACCINE  2017   • LIPID PANEL  2018   • MAMMOGRAM  2019   Colonoscopy - you are up to date  Mammogram- you are up to date and will be due for this next year  Flu shot - continue to get flu shot annually  Pelvic exam, pap smear - follow recommendations from your gynecologist  Bone density - you are due for this  Pneumonia vaccines - There are two.  You have had one and we will give the second one today.         No orders of the defined types were placed in this encounter.      No Follow-up on file.

## 2017-10-06 NOTE — PROGRESS NOTES
Chief Complaint   Patient presents with   • Annual Exam     subsequent wellness exam   • Hyperlipidemia   • Hypertension        Subjective   Pamela Durham is a 81 y.o. female     HPI: Hyperlipidemia:  This is a chronic problem.   No management changes were made at her last appointment.   Her most recent lipid panel was done on 5/31/2017.  Total cholesterol was 120, Triglycerides 109, HDL 39, LDL 59.   Current treatment: Statin medication.   Prudent diet and regular exercise have also been recommended. By report, there is good compliance with treatment and good tolerance.    She has not experienced statin associated myalgias, dyspepsia.     Hypertension: This is a chronic problem.   No management changes were made at her last appointment.       She has not had problems with headaches, visual disturbance, dizziness.  Recent blood pressures have been controlled.    She has not had associated symptoms of chest pain, syncope, edema, orthopnea, HOOPER, PND.     Current treatment: CCB, BB  Prudent diet and regular exercise have also been recommended. By report, there is good compliance and good tolerance of medication.     Elevated fasting glucose: She has history of elevated fasting glucose.  She has not had problems with unusual thirst or urinary frequency.  No vision changes.  No appetite changes.  She has had some weight loss recently.  Hemoglobin A1c done 5/31/2017 was 5.9.     She has recently had hematology evaluation for thrombocytopenia.  This prompted a CT scan of abdomen and pelvis.  She has mild splenomegaly.  Liver cirrhosis was also noted.  She has had gastroenterology evaluation for that.  Evaluation for viral hepatitis, autoimmune problems negative.     The following portions of the patient's history were reviewed and updated as appropriate: allergies, current medications, past social history, problem list, past surgical history    Review of Systems   Constitutional: Negative for activity change and appetite  "change.   HENT: Negative for nosebleeds.    Eyes: Negative for visual disturbance.   Respiratory: Negative for cough and shortness of breath.    Cardiovascular: Negative for chest pain, palpitations and leg swelling.   Neurological: Negative for headaches.   Psychiatric/Behavioral: Negative for sleep disturbance.           Objective     /58  Ht 62.9\" (159.8 cm)  Wt 144 lb (65.3 kg)  BMI 25.59 kg/m2     Physical Exam   Constitutional: She is oriented to person, place, and time. She appears well-developed and well-nourished. No distress.   Neck: Normal carotid pulses present. Carotid bruit is not present.   Cardiovascular: Normal rate, regular rhythm, S1 normal, S2 normal and intact distal pulses.  Exam reveals no gallop and no friction rub.    No murmur heard.  Pulses:       Carotid pulses are 2+ on the right side, and 2+ on the left side.  Pulmonary/Chest: Effort normal and breath sounds normal. No respiratory distress. She has no wheezes. She has no rhonchi. She has no rales. Chest wall is not dull to percussion.   Musculoskeletal: She exhibits no edema.   Neurological: She is alert and oriented to person, place, and time.   Skin: Skin is warm and dry.   Nursing note and vitals reviewed.      Assessment/Plan   Problem List Items Addressed This Visit        Cardiovascular and Mediastinum    Essential hypertension    Hyperlipemia    Relevant Orders    Lipid Panel (Completed)       Endocrine    Elevated fasting glucose    Relevant Orders    Hemoglobin A1c (Completed)      Other Visit Diagnoses     Medicare annual wellness visit, subsequent    -  Primary    Need for vaccination        Relevant Orders    Pneumococcal Conjugate Vaccine 13-Valent All (Completed)    Osteoporosis screening        Relevant Orders    DEXA Bone Density Axial            "

## 2017-10-06 NOTE — PROGRESS NOTES
Chief Complaint   Patient presents with   • Annual Exam     subsequent wellness exam   • Hyperlipidemia   • Hypertension        Subjective   Pamela Durham is a 81 y.o. female     HPI: Hyperlipidemia:  This is a chronic problem.   No management changes were made at her last appointment.   Her most recent lipid panel was done on 5/31/2017.  Total cholesterol was 120, Triglycerides 109, HDL 39, LDL 59.   Current treatment: Statin medication.   Prudent diet and regular exercise have also been recommended. By report, there is good compliance with treatment and good tolerance.    She has not experienced statin associated myalgias, dyspepsia.      Hypertension: This is a chronic problem.   No management changes were made at her last appointment.       She has not had problems with headaches, visual disturbance, dizziness.  Recent blood pressures have been controlled.    She has not had associated symptoms of chest pain, syncope, edema, orthopnea, HOOPER, PND.     Current treatment: BB, CCB  Prudent diet and regular exercise have also been recommended. By report, there is good compliance and good tolerance of medication.     Elevated fasting glucose:  She  has had elevated fasting glucose in the past.  She denies polyuria, polydipsia, vision change appetite change, unexplained weight loss.   Lab Results   Component Value Date    HGBA1C 5.87 (H) 10/06/2017        The following portions of the patient's history were reviewed and updated as appropriate: allergies, current medications, past social history, problem list, past surgical history    Review of Systems   Constitutional: Negative for activity change and appetite change.   HENT: Negative for nosebleeds.    Eyes: Negative for visual disturbance.   Respiratory: Negative for cough and shortness of breath.    Cardiovascular: Negative for chest pain, palpitations and leg swelling.   Neurological: Negative for headaches.   Psychiatric/Behavioral: Negative for sleep disturbance.  "          Objective     /58  Ht 62.9\" (159.8 cm)  Wt 144 lb (65.3 kg)  BMI 25.59 kg/m2     Physical Exam   Constitutional: She is oriented to person, place, and time. She appears well-developed and well-nourished. No distress.   Neck: Normal carotid pulses present. Carotid bruit is not present.   Cardiovascular: Normal rate, regular rhythm, S1 normal, S2 normal and intact distal pulses.  Exam reveals no gallop and no friction rub.    No murmur heard.  Pulses:       Carotid pulses are 2+ on the right side, and 2+ on the left side.  Pulmonary/Chest: Effort normal and breath sounds normal. No respiratory distress. She has no wheezes. She has no rhonchi. She has no rales. Chest wall is not dull to percussion.   Musculoskeletal: She exhibits no edema.   Neurological: She is alert and oriented to person, place, and time.   Skin: Skin is warm and dry.   Nursing note and vitals reviewed.      Assessment/Plan   Problem List Items Addressed This Visit        Cardiovascular and Mediastinum    Essential hypertension    Hyperlipemia       Endocrine    Elevated fasting glucose      Other Visit Diagnoses     Medicare annual wellness visit, subsequent    -  Primary            "

## 2017-10-12 ENCOUNTER — OFFICE VISIT (OUTPATIENT)
Dept: INTERNAL MEDICINE | Facility: CLINIC | Age: 82
End: 2017-10-12

## 2017-10-12 VITALS
WEIGHT: 146 LBS | SYSTOLIC BLOOD PRESSURE: 128 MMHG | BODY MASS INDEX: 25.95 KG/M2 | DIASTOLIC BLOOD PRESSURE: 80 MMHG | TEMPERATURE: 98.4 F

## 2017-10-12 DIAGNOSIS — H92.01 OTALGIA OF RIGHT EAR: Primary | ICD-10-CM

## 2017-10-12 DIAGNOSIS — H61.21 IMPACTED CERUMEN OF RIGHT EAR: ICD-10-CM

## 2017-10-12 PROCEDURE — 69209 REMOVE IMPACTED EAR WAX UNI: CPT | Performed by: NURSE PRACTITIONER

## 2017-10-12 PROCEDURE — 99213 OFFICE O/P EST LOW 20 MIN: CPT | Performed by: NURSE PRACTITIONER

## 2017-10-12 RX ORDER — AMOXICILLIN 500 MG/1
500 CAPSULE ORAL 2 TIMES DAILY
Qty: 20 CAPSULE | Refills: 0 | Status: SHIPPED | OUTPATIENT
Start: 2017-10-12 | End: 2017-10-22

## 2017-10-12 NOTE — PATIENT INSTRUCTIONS
Earwax Buildup  Your ears make a substance called earwax. It may also be called cerumen. Sometimes, too much earwax builds up in your ear canal. This can cause ear pain and make it harder for you to hear.  CAUSES  This condition is caused by too much earwax production or buildup.  RISK FACTORS  The following factors may make you more likely to develop this condition:  · Cleaning your ears often with swabs.  · Having narrow ear canals.  · Having earwax that is overly thick or sticky.  · Having eczema.  · Being dehydrated.  SYMPTOMS  Symptoms of this condition include:  · Reduced hearing.  · Ear drainage.  · Ear pain.  · Ear itch.  · A feeling of fullness in the ear or feeling that the ear is plugged.  · Ringing in the ear.  · Coughing.  DIAGNOSIS  Your health care provider can diagnose this condition based on your symptoms and medical history. Your health care provider will also do an ear exam to look inside your ear with a scope (otoscope). You may also have a hearing test.  TREATMENT  Treatment for this condition includes:  · Over-the-counter or prescription ear drops to soften the earwax.  · Earwax removal by a health care provider. This may be done:    By flushing the ear with body-temperature water.    With a medical instrument that has a loop at the end (earwax curette).    With a suction device.  HOME CARE INSTRUCTIONS  · Take over-the-counter and prescription medicines only as told by your health care provider.  · Do not put any objects, including an ear swab, into your ear. You can clean the opening of your ear canal with a washcloth.  · Drink enough water to keep your urine clear or pale yellow.  · If you have frequent earwax buildup or you use hearing aids, consider seeing your health care provider every 6-12 months for routine preventive ear cleanings. Keep all follow-up visits as told by your health care provider.  SEEK MEDICAL CARE IF:  · You have ear pain.  · Your condition does not improve with  treatment.  · You have hearing loss.  · You have blood, pus, or other fluid coming from your ear.     This information is not intended to replace advice given to you by your health care provider. Make sure you discuss any questions you have with your health care provider.     Document Released: 01/25/2006 Document Revised: 04/10/2017 Document Reviewed: 08/03/2016  [employer] Interactive Patient Education ©2017 [employer]  Earache  An earache, also called otalgia, can be caused by many things. Pain from an earache can be sharp, dull, or burning. The pain may be temporary or constant.  Earaches can be caused by problems with the ear, such as infection in either the middle ear or the ear canal, injury, impacted ear wax, middle ear pressure, or a foreign body in the ear. Ear pain can also result from problems in other areas. This is called referred pain. For example, pain can come from a sore throat, a tooth infection, or problems with the jaw or the joint between the jaw and the skull (temporomandibular joint, or TMJ).  The cause of an earache is not always easy to identify. Watchful waiting may be appropriate for some earaches until a clear cause of the pain can be found.  HOME CARE INSTRUCTIONS  Watch your condition for any changes. The following actions may help to lessen any discomfort that you are feeling:  · Take medicines only as directed by your health care provider. This includes ear drops.  · Apply ice to your outer ear to help reduce pain.    Put ice in a plastic bag.    Place a towel between your skin and the bag.    Leave the ice on for 20 minutes, 2-3 times per day.  · Do not put anything in your ear other than medicine that is prescribed by your health care provider.  · Try resting in an upright position instead of lying down. This may help to reduce pressure in the middle ear and relieve pain.  · Chew gum if it helps to relieve your ear pain.  · Control any allergies that you have.  · Keep all  follow-up visits as directed by your health care provider. This is important.  SEEK MEDICAL CARE IF:  · Your pain does not improve within 2 days.  · You have a fever.  · You have new or worsening symptoms.  SEEK IMMEDIATE MEDICAL CARE IF:  · You have a severe headache.  · You have a stiff neck.  · You have difficulty swallowing.  · You have redness or swelling behind your ear.  · You have drainage from your ear.  · You have hearing loss.  · You feel dizzy.     This information is not intended to replace advice given to you by your health care provider. Make sure you discuss any questions you have with your health care provider.     Document Released: 08/04/2005 Document Revised: 01/08/2016 Document Reviewed: 07/19/2015  [employer] Interactive Patient Education ©2017 [employer]

## 2017-10-12 NOTE — PROGRESS NOTES
Subjective   Pamela Durham is a 81 y.o. female.     HPI Comments: She does use q-tips in ears but reports she doesn't push them in the ear canal far.     Earache    There is pain in the right ear. This is a new problem. The current episode started in the past 7 days. The problem has been unchanged. There has been no fever. The pain is at a severity of 8/10. The pain is severe. Pertinent negatives include no abdominal pain, coughing, ear discharge, headaches, hearing loss, neck pain, rhinorrhea or sore throat. She has tried NSAIDs (aleve ) for the symptoms. The treatment provided moderate relief. Her past medical history is significant for a chronic ear infection (childhood only ). There is no history of hearing loss or a tympanostomy tube.        The following portions of the patient's history were reviewed and updated as appropriate: allergies, current medications and problem list.    Review of Systems   Constitutional: Negative for appetite change, chills, fatigue and fever.   HENT: Positive for ear pain (pressure/fullness sensation in right ear only ). Negative for congestion, ear discharge, facial swelling, hearing loss, postnasal drip, rhinorrhea, sinus pressure, sneezing, sore throat and tinnitus.    Respiratory: Negative for cough, chest tightness, shortness of breath and wheezing.    Cardiovascular: Negative for chest pain.   Gastrointestinal: Negative for abdominal pain.   Musculoskeletal: Negative for neck pain and neck stiffness.   Allergic/Immunologic: Negative for environmental allergies.   Neurological: Negative for dizziness and headaches.   Hematological: Negative for adenopathy.       Objective   Physical Exam   Constitutional: She appears well-developed and well-nourished. She is cooperative. She does not have a sickly appearance. She does not appear ill.   HENT:   Head: Normocephalic.   Right Ear: Hearing and external ear normal. No drainage, swelling or tenderness. No mastoid tenderness.  Tympanic membrane is erythematous and bulging. Tympanic membrane is not injected and not scarred. Tympanic membrane mobility is normal. No middle ear effusion. No decreased hearing is noted.   Left Ear: Hearing and external ear normal. No drainage, swelling or tenderness. No mastoid tenderness. Tympanic membrane is not injected, not scarred, not erythematous and not bulging. Tympanic membrane mobility is normal.  No middle ear effusion. No decreased hearing is noted.   Nose: Nose normal. No mucosal edema, rhinorrhea, sinus tenderness or nasal deformity. Right sinus exhibits no maxillary sinus tenderness and no frontal sinus tenderness. Left sinus exhibits no maxillary sinus tenderness and no frontal sinus tenderness.   Mouth/Throat: Oropharynx is clear and moist and mucous membranes are normal. Normal dentition.   Right ear with cerumen impaction    Eyes: Conjunctivae and lids are normal. Pupils are equal, round, and reactive to light. Right eye exhibits no discharge and no exudate. Left eye exhibits no discharge and no exudate.   Neck: Trachea normal and normal range of motion. Carotid bruit is not present. No edema present. No thyroid mass and no thyromegaly present.   Cardiovascular: Regular rhythm, normal heart sounds and normal pulses.    No murmur heard.  Pulmonary/Chest: Breath sounds normal. No respiratory distress. She has no decreased breath sounds. She has no wheezes. She has no rhonchi. She has no rales.   Lymphadenopathy:        Head (right side): No submental, no submandibular, no tonsillar, no preauricular, no posterior auricular and no occipital adenopathy present.        Head (left side): No submental, no submandibular, no tonsillar, no preauricular, no posterior auricular and no occipital adenopathy present.     She has no cervical adenopathy.   Neurological: She is alert.   Skin: Skin is warm, dry and intact. No cyanosis. Nails show no clubbing.       Assessment/Plan   Pamela was seen today for  earache.    Diagnoses and all orders for this visit:    Otalgia of right ear  -     amoxicillin (AMOXIL) 500 MG capsule; Take 1 capsule by mouth 2 (Two) Times a Day for 10 days.    Impacted cerumen of right ear  -     Ear Cerumen Removal Lavage    Ear Cerumen Removal Lavage  Date/Time: 10/12/2017 9:43 AM  Performed by: DIONNE BLANCO  Authorized by: DIONNE BLANCO   Consent: Verbal consent obtained.  Risks and benefits: risks, benefits and alternatives were discussed  Consent given by: patient  Ceruminolytics applied: Ceruminolytics applied prior to the procedure.  Location details: right ear  Procedure type: irrigation  Patient tolerance: Patient tolerated the procedure well with no immediate complications

## 2018-01-02 RX ORDER — PANTOPRAZOLE SODIUM 40 MG/1
TABLET, DELAYED RELEASE ORAL
Qty: 30 TABLET | Refills: 0 | Status: SHIPPED | OUTPATIENT
Start: 2018-01-02 | End: 2018-01-30 | Stop reason: SDUPTHER

## 2018-01-11 RX ORDER — FERROUS SULFATE 325(65) MG
TABLET ORAL
Qty: 30 TABLET | Refills: 5 | Status: SHIPPED | OUTPATIENT
Start: 2018-01-11 | End: 2018-07-16 | Stop reason: SDUPTHER

## 2018-01-30 RX ORDER — PANTOPRAZOLE SODIUM 40 MG/1
TABLET, DELAYED RELEASE ORAL
Qty: 30 TABLET | Refills: 5 | Status: SHIPPED | OUTPATIENT
Start: 2018-01-30 | End: 2018-07-19 | Stop reason: SDUPTHER

## 2018-01-31 RX ORDER — CARVEDILOL 3.12 MG/1
TABLET ORAL
Qty: 180 TABLET | Refills: 1 | Status: SHIPPED | OUTPATIENT
Start: 2018-01-31 | End: 2018-07-26 | Stop reason: SDUPTHER

## 2018-01-31 RX ORDER — ATORVASTATIN CALCIUM 80 MG/1
TABLET, FILM COATED ORAL
Qty: 90 TABLET | Refills: 1 | Status: SHIPPED | OUTPATIENT
Start: 2018-01-31 | End: 2018-07-25 | Stop reason: SDUPTHER

## 2018-02-23 ENCOUNTER — OFFICE VISIT (OUTPATIENT)
Dept: CARDIOLOGY | Facility: CLINIC | Age: 83
End: 2018-02-23

## 2018-02-23 VITALS
SYSTOLIC BLOOD PRESSURE: 152 MMHG | WEIGHT: 149 LBS | HEIGHT: 63 IN | HEART RATE: 66 BPM | BODY MASS INDEX: 26.4 KG/M2 | DIASTOLIC BLOOD PRESSURE: 78 MMHG

## 2018-02-23 DIAGNOSIS — I10 ESSENTIAL HYPERTENSION: ICD-10-CM

## 2018-02-23 DIAGNOSIS — I25.10 CORONARY ARTERY DISEASE INVOLVING NATIVE CORONARY ARTERY OF NATIVE HEART WITHOUT ANGINA PECTORIS: ICD-10-CM

## 2018-02-23 DIAGNOSIS — E78.5 HYPERLIPIDEMIA, UNSPECIFIED HYPERLIPIDEMIA TYPE: Primary | ICD-10-CM

## 2018-02-23 PROCEDURE — 93000 ELECTROCARDIOGRAM COMPLETE: CPT | Performed by: INTERNAL MEDICINE

## 2018-02-23 PROCEDURE — 99214 OFFICE O/P EST MOD 30 MIN: CPT | Performed by: INTERNAL MEDICINE

## 2018-02-23 RX ORDER — AMLODIPINE BESYLATE 2.5 MG/1
2.5 TABLET ORAL DAILY
Qty: 90 TABLET | Refills: 3 | Status: SHIPPED | OUTPATIENT
Start: 2018-02-23 | End: 2019-01-18 | Stop reason: SDUPTHER

## 2018-02-23 NOTE — PROGRESS NOTES
Pamela Durham  1935  Date of Office Visit:2/23/18  Encounter Provider: Desean Earl MD  Place of Service: [facility]      CHIEF COMPLAINT:   1. Coronary artery disease  2. Iron deficiency anemia.   3. Inferior wall motion abnormality on echocardiogram.    4. Mild-to-moderate mitral regurgitation.     HISTORY OF PRESENT ILLNESS: Dr. Casillas,   I had the pleasure of seeing your patient Pamela Durham in followup secondary to her medical history of iron deficiency anemia, non-ST elevation myocardial infarction, small bilateral pleural effusions, and the finding on GI evaluation of multiple colonic angiodysplastic lesions treated with argon plasma coagulation.  The patient denied any chest pain at that time.  Secondary to her non-ST elevation myocardial infarction, she did undergo a thoracic echocardiogram which showed her to have a normal ejection fraction but an inferior wall motion defect consistent with mild hypokinesis.  She had mild-to-moderate mitral regurgitation, mild tricuspid regurgitation and a right ventricular systolic pressure of 43 mmHg.      She was sent for myocardial perfusion stress test which showed a medium-sized infarct of the inferior wall with moderate nova-infarct ischemia.  As such she was referred for coronary angiography which showed a chronic total occlusion of the circumflex along with 40% RCA stenosis.  The OM1 branch filled via left to left collaterals.  There was also a 70% LAD stenosis, however this was not intervened upon secondary to the normal perfusion in that territory but also patient being asymptomatic and having a high risk of bleeding on the lab that therapy.     Since our last visit she has actually doing very well.  She denies any discomfort or dyspnea on exertion.  She has not been taking her felodipine therapy.  Her blood pressure is elevated today.          Review of Systems   Constitution: Negative for fever, weakness  and malaise/fatigue.   HENT: Negative for nosebleeds and sore throat.    Eyes: Negative for blurred vision and double vision.   Cardiovascular: Negative for chest pain, claudication, palpitations and syncope.   Respiratory: Negative for cough, shortness of breath and snoring.    Endocrine: Negative for cold intolerance, heat intolerance and polydipsia.   Skin: Negative for itching, poor wound healing and rash.   Musculoskeletal: Negative for joint pain, joint swelling, muscle weakness and myalgias.   Gastrointestinal: Negative for abdominal pain, melena, nausea and vomiting.   Neurological: Negative for light-headedness, loss of balance, seizures and vertigo.   Psychiatric/Behavioral: Negative for altered mental status and depression.          Past Medical History:   Diagnosis Date   • Anemia    • Cirrhosis, non-alcoholic    • Gallbladder stone without cholecystitis or obstruction     gallstone seen on ultrasound 2007   • GERD (gastroesophageal reflux disease)    • Health care maintenance    • Hyperlipidemia    • Hypertension    • Lumbar canal stenosis    • Lumbar radiculopathy    • MI (myocardial infarction) 12/25/2016    NON-STEMI   • NSTEMI (non-ST elevated myocardial infarction)    • Osteoarthritis    • Thrombophlebitis of superficial veins of upper extremities     LEFT       The following portions of the patient's history were reviewed and updated as appropriate: Social history , Family history and Surgical history     Current Outpatient Prescriptions on File Prior to Visit   Medication Sig Dispense Refill   • atorvastatin (LIPITOR) 80 MG tablet Take 80 mg by mouth Daily.     • atorvastatin (LIPITOR) 80 MG tablet TAKE 1 TABLET BY MOUTH EVERY NIGHT 90 tablet 1   • BIOTIN PO Take 1 tablet by mouth Daily.     • CALCIUM PO Take 2 tablets by mouth Daily.     • carvedilol (COREG) 3.125 MG tablet Take 3.125 mg by mouth 2 (Two) Times a Day With Meals.     • carvedilol (COREG) 3.125 MG tablet TAKE 1 TABLET BY MOUTH  "EVERY 12 HOURS 180 tablet 1   • Cholecalciferol (VITAMIN D-3 PO) Take 1 tablet by mouth Daily.     • docusate sodium (COLACE) 100 MG capsule Take 100 mg by mouth 2 (Two) Times a Day.     • felodipine (PLENDIL) 2.5 MG 24 hr tablet Take 2.5 mg by mouth Daily.     • ferrous sulfate 325 (65 FE) MG tablet TAKE 1 TABLET BY MOUTH DAILY WITH BREAKFAST 30 tablet 0   • ferrous sulfate 325 (65 FE) MG tablet TAKE 1 TABLET BY MOUTH DAILY WITH BREAKFAST 30 tablet 5   • HYDROcodone-acetaminophen (NORCO) 7.5-325 MG per tablet Take 1 tablet by mouth Every 6 (Six) Hours As Needed for Moderate Pain (4-6). 50 tablet 0   • Multiple Vitamins-Minerals (CENTRUM SILVER) tablet Take 1 tablet by mouth Daily.     • pantoprazole (PROTONIX) 40 MG EC tablet TAKE 1 TABLET BY MOUTH DAILY 30 tablet 5     No current facility-administered medications on file prior to visit.        No Known Allergies    Vitals:    02/23/18 1356   BP: 152/78   Pulse: 66   Weight: 67.6 kg (149 lb)   Height: 160 cm (63\")     Physical Exam   Constitutional: She is oriented to person, place, and time. She appears well-developed and well-nourished.   HENT:   Head: Normocephalic and atraumatic.   Eyes: Conjunctivae and EOM are normal. No scleral icterus.   Neck: Normal range of motion. Neck supple. Normal carotid pulses, no hepatojugular reflux and no JVD present. Carotid bruit is not present. No tracheal deviation present. No thyromegaly present.   Cardiovascular: Normal rate and regular rhythm.  Exam reveals no gallop and no friction rub.    No murmur heard.  Pulses:       Carotid pulses are 2+ on the right side, and 2+ on the left side.       Radial pulses are 2+ on the right side, and 2+ on the left side.        Femoral pulses are 2+ on the right side, and 2+ on the left side.       Dorsalis pedis pulses are 2+ on the right side, and 2+ on the left side.        Posterior tibial pulses are 2+ on the right side, and 2+ on the left side.   Pulmonary/Chest: Breath sounds " normal. No respiratory distress. She has no decreased breath sounds. She has no wheezes. She has no rhonchi. She has no rales. She exhibits no tenderness.   Abdominal: Soft. Bowel sounds are normal. She exhibits no distension. There is no tenderness. There is no rebound.   Musculoskeletal: She exhibits no edema or deformity.   Neurological: She is alert and oriented to person, place, and time. She has normal strength. No sensory deficit.   Skin: No rash noted. No erythema.   Psychiatric: She has a normal mood and affect. Her behavior is normal.       No components found for: CBC  No results found for: CMP  No components found for: LIPID  No results found for: BMP      ECG 12 Lead  Date/Time: 2/23/2018 2:23 PM  Performed by: ERWIN SWEENEY  Authorized by: ERWIN SWEENEY   Comparison: compared with previous ECG from 7/24/2017  Rhythm: sinus rhythm  Ectopy: unifocal PVCs  Rate: normal  QRS axis: normal  Clinical impression: abnormal ECG  Comments: Nonspecific ST-T wave abnormalities diffuse leads             3/1/17  Conclusions:   1. Left main:  severe distal calcification.  Discrete 40% distal stenosis.    2. LAD: severe proximal calcification.  Discrete 70% proximal stenosis.    3. LCX:chronic total occlusion OM1.  Fills via left to left collaterals.    4. RCA: discrete 40% ostial stenosis.    5. Mild inferolateral hypokinesis.  Normal ejection fraction.          Recommendations: Patient has previously had a an infarction secondary to the obtuse marginal branch.  She also has disease of the proximal LAD.  There is severe calcification and this would require rotational atherectomy to fix.  As she is asymptomatic at this point in time medical management will be offered initially.  If she has dyspnea or chest discomfort I would schedule her initially for a rotational atherectomy and PCI of the proximal LAD.     DISCUSSION/SUMMARY   82-year-old female with a medical history as documented above including  non-ST elevation myocardial infarction in the setting of acute blood loss anemia and colonic angiodysplastic lesions requiring argon laser therapy, wall motion abnormality of the inferior wall with a preserved ejection fraction, mitral and tricuspid regurgitation who presents back for followup.  She underwent stress test showing inferior infarct with nova-infarct ischemia.  She has a chronic total occlusion of the obtuse marginal branch which fills via left to left collaterals.  She also had LAD disease but no evidence of ischemia in that territory and was asymptomatic.  She is receiving medical management.  She has been doing well and is asymptomatic.  She has a mildly elevated blood pressure, however this is well controlled at home.      1. Coronary artery disease.  No angina.  Medical management   -Continue atorvastatin at current dose.   - continue aspirin 81 mg daily.    2. Essential hypertension; Not at goal.  This has been well controlled at home.  She has not been taking her felodipine.  I will move her to amlodipine 2.5 mg by mouth daily.  3.  Hyperlipidemia: As above.  Continue atorvastatin at current dose     I will plan to see the patient back in the office in 1 year or earlier with problems    Coronary Artery Disease  Assessment  • The patient has no angina    Plan  • Lifestyle modifications discussed include adhering to a heart healthy diet, avoidance of tobacco products, maintenance of a healthy weight, regular exercise and regular monitoring of cholesterol and blood pressure    Subjective - Objective  • Current antiplatelet therapy includes aspirin 81 mg

## 2018-02-28 ENCOUNTER — CLINICAL SUPPORT (OUTPATIENT)
Dept: INTERNAL MEDICINE | Facility: CLINIC | Age: 83
End: 2018-02-28

## 2018-02-28 ENCOUNTER — OFFICE VISIT (OUTPATIENT)
Dept: INTERNAL MEDICINE | Facility: CLINIC | Age: 83
End: 2018-02-28

## 2018-02-28 VITALS
WEIGHT: 147 LBS | SYSTOLIC BLOOD PRESSURE: 122 MMHG | BODY MASS INDEX: 26.05 KG/M2 | DIASTOLIC BLOOD PRESSURE: 64 MMHG | HEIGHT: 63 IN

## 2018-02-28 DIAGNOSIS — I10 ESSENTIAL HYPERTENSION: Primary | ICD-10-CM

## 2018-02-28 DIAGNOSIS — Z13.820 OSTEOPOROSIS SCREENING: ICD-10-CM

## 2018-02-28 DIAGNOSIS — M54.16 LUMBAR RADICULOPATHY: ICD-10-CM

## 2018-02-28 DIAGNOSIS — E78.5 HYPERLIPIDEMIA, UNSPECIFIED HYPERLIPIDEMIA TYPE: ICD-10-CM

## 2018-02-28 DIAGNOSIS — R73.01 ELEVATED FASTING GLUCOSE: ICD-10-CM

## 2018-02-28 DIAGNOSIS — Z79.891 OPIOID CONTRACT EXISTS: ICD-10-CM

## 2018-02-28 LAB
ALBUMIN SERPL-MCNC: 4 G/DL (ref 3.5–5.2)
ALBUMIN/GLOB SERPL: 1.3 G/DL
ALP SERPL-CCNC: 98 U/L (ref 39–117)
ALT SERPL W P-5'-P-CCNC: 29 U/L (ref 1–33)
ANION GAP SERPL CALCULATED.3IONS-SCNC: 10.7 MMOL/L
AST SERPL-CCNC: 39 U/L (ref 1–32)
BILIRUB SERPL-MCNC: 0.9 MG/DL (ref 0.1–1.2)
BUN BLD-MCNC: 14 MG/DL (ref 8–23)
BUN/CREAT SERPL: 14.9 (ref 7–25)
CALCIUM SPEC-SCNC: 9.9 MG/DL (ref 8.6–10.5)
CHLORIDE SERPL-SCNC: 104 MMOL/L (ref 98–107)
CHOLEST SERPL-MCNC: 119 MG/DL (ref 0–200)
CO2 SERPL-SCNC: 27.3 MMOL/L (ref 22–29)
CREAT BLD-MCNC: 0.94 MG/DL (ref 0.57–1)
DEPRECATED RDW RBC AUTO: 45.6 FL (ref 37–54)
ERYTHROCYTE [DISTWIDTH] IN BLOOD BY AUTOMATED COUNT: 14.3 % (ref 11.5–15)
GFR SERPL CREATININE-BSD FRML MDRD: 57 ML/MIN/1.73
GLOBULIN UR ELPH-MCNC: 3 GM/DL
GLUCOSE BLD-MCNC: 119 MG/DL (ref 65–99)
HBA1C MFR BLD: 6.06 % (ref 4.8–5.6)
HCT VFR BLD AUTO: 43 % (ref 34.1–44.9)
HDLC SERPL-MCNC: 43 MG/DL (ref 40–60)
HGB BLD-MCNC: 14.5 G/DL (ref 11.2–15.7)
LDLC SERPL CALC-MCNC: 56 MG/DL (ref 0–100)
LDLC/HDLC SERPL: 1.29 {RATIO}
MCH RBC QN AUTO: 30.2 PG (ref 26–34)
MCHC RBC AUTO-ENTMCNC: 33.7 G/DL (ref 31–37)
MCV RBC AUTO: 89.6 FL (ref 80–100)
PLATELET # BLD AUTO: 82 10*3/MM3 (ref 150–450)
PMV BLD AUTO: 12.1 FL (ref 6–12)
POTASSIUM BLD-SCNC: 4.2 MMOL/L (ref 3.5–5.2)
PROT SERPL-MCNC: 7 G/DL (ref 6–8.5)
RBC # BLD AUTO: 4.8 10*6/MM3 (ref 3.93–5.22)
SODIUM BLD-SCNC: 142 MMOL/L (ref 136–145)
TRIGL SERPL-MCNC: 102 MG/DL (ref 0–150)
VLDLC SERPL-MCNC: 20.4 MG/DL (ref 5–40)
WBC NRBC COR # BLD: 3.66 10*3/MM3 (ref 5–10)

## 2018-02-28 PROCEDURE — 80053 COMPREHEN METABOLIC PANEL: CPT | Performed by: INTERNAL MEDICINE

## 2018-02-28 PROCEDURE — 85027 COMPLETE CBC AUTOMATED: CPT | Performed by: INTERNAL MEDICINE

## 2018-02-28 PROCEDURE — 80061 LIPID PANEL: CPT | Performed by: INTERNAL MEDICINE

## 2018-02-28 PROCEDURE — 99214 OFFICE O/P EST MOD 30 MIN: CPT | Performed by: INTERNAL MEDICINE

## 2018-02-28 PROCEDURE — 36415 COLL VENOUS BLD VENIPUNCTURE: CPT | Performed by: INTERNAL MEDICINE

## 2018-02-28 PROCEDURE — 77080 DXA BONE DENSITY AXIAL: CPT | Performed by: INTERNAL MEDICINE

## 2018-02-28 PROCEDURE — 83036 HEMOGLOBIN GLYCOSYLATED A1C: CPT | Performed by: INTERNAL MEDICINE

## 2018-02-28 RX ORDER — HYDROCODONE BITARTRATE AND ACETAMINOPHEN 7.5; 325 MG/1; MG/1
1 TABLET ORAL EVERY 6 HOURS PRN
Qty: 50 TABLET | Refills: 0 | Status: SHIPPED | OUTPATIENT
Start: 2018-02-28 | End: 2018-07-17 | Stop reason: SDUPTHER

## 2018-02-28 NOTE — PROGRESS NOTES
Chief Complaint   Patient presents with   • Hypertension     4 month follow up   • Hyperlipidemia        Subjective   Pamela Durham is a 82 y.o. female     Hypertension   This is a chronic problem. The problem is controlled. Pertinent negatives include no blurred vision, chest pain, headaches, orthopnea, palpitations, peripheral edema, PND or shortness of breath. There are no associated agents to hypertension. Risk factors for coronary artery disease include dyslipidemia and sedentary lifestyle. Past treatments include calcium channel blockers and beta blockers (This is current treatment). The current treatment provides significant improvement. There are no compliance problems.  Hypertensive end-organ damage includes CAD/MI.   Hyperlipidemia   This is a chronic problem. The problem is controlled. Recent lipid tests were reviewed and are normal. She has no history of diabetes. Factors aggravating her hyperlipidemia include beta blockers. Pertinent negatives include no chest pain, leg pain, myalgias or shortness of breath. Current antihyperlipidemic treatment includes statins. The current treatment provides significant improvement of lipids. There are no compliance problems.      Elevated fasting glucose:  She  has had elevated fasting glucose in the past.  She denies polyuria, polydipsia, vision change appetite change, unexplained weight loss.   Lab Results   Component Value Date    HGBA1C 5.87 (H) 10/06/2017           Lumbar radiculopathy: needs refill on hydrocodone    The following portions of the patient's history were reviewed and updated as appropriate: allergies, current medications, past social history, problem list, past surgical history    Review of Systems   Constitutional: Negative for activity change and appetite change.   HENT: Negative for nosebleeds.    Eyes: Negative for blurred vision and visual disturbance.   Respiratory: Negative for cough and shortness of breath.    Cardiovascular: Negative for  "chest pain, palpitations, orthopnea, leg swelling and PND.   Gastrointestinal: Negative for nausea and vomiting.   Endocrine: Negative for cold intolerance and heat intolerance.   Musculoskeletal: Negative for myalgias.   Neurological: Negative for headaches.   Psychiatric/Behavioral: Negative for sleep disturbance.           Objective     /64  Ht 160 cm (63\")  Wt 66.7 kg (147 lb)  BMI 26.04 kg/m2     Physical Exam   Constitutional: She is oriented to person, place, and time. She appears well-developed and well-nourished. No distress.   Neck: Normal carotid pulses present. Carotid bruit is not present.   Cardiovascular: Normal rate, regular rhythm, S1 normal, S2 normal and intact distal pulses.  Exam reveals no gallop and no friction rub.    No murmur heard.  Pulses:       Carotid pulses are 2+ on the right side, and 2+ on the left side.  Pulmonary/Chest: Effort normal and breath sounds normal. No respiratory distress. She has no wheezes. She has no rhonchi. She has no rales. Chest wall is not dull to percussion.   Musculoskeletal: She exhibits no edema.   Neurological: She is alert and oriented to person, place, and time.   Skin: Skin is warm and dry.   Nursing note and vitals reviewed.      Assessment/Plan   Problem List Items Addressed This Visit        Cardiovascular and Mediastinum    Essential hypertension - Primary    Relevant Orders    Comprehensive Metabolic Panel    CBC (No Diff)    Lipid Panel    Hyperlipemia       Endocrine    Elevated fasting glucose    Relevant Orders    Hemoglobin A1c       Nervous and Auditory    Lumbar radiculopathy        Hydrocodone refilled.    "

## 2018-03-01 ENCOUNTER — OFFICE VISIT (OUTPATIENT)
Dept: ONCOLOGY | Facility: CLINIC | Age: 83
End: 2018-03-01

## 2018-03-01 ENCOUNTER — LAB (OUTPATIENT)
Dept: OTHER | Facility: HOSPITAL | Age: 83
End: 2018-03-01

## 2018-03-01 VITALS
OXYGEN SATURATION: 96 % | DIASTOLIC BLOOD PRESSURE: 79 MMHG | TEMPERATURE: 98.4 F | SYSTOLIC BLOOD PRESSURE: 151 MMHG | HEART RATE: 65 BPM | HEIGHT: 63 IN | WEIGHT: 149.4 LBS | BODY MASS INDEX: 26.47 KG/M2 | RESPIRATION RATE: 16 BRPM

## 2018-03-01 DIAGNOSIS — D70.9 NEUTROPENIA, UNSPECIFIED TYPE (HCC): ICD-10-CM

## 2018-03-01 DIAGNOSIS — D69.6 THROMBOCYTOPENIA (HCC): ICD-10-CM

## 2018-03-01 DIAGNOSIS — D69.6 THROMBOCYTOPENIA (HCC): Primary | ICD-10-CM

## 2018-03-01 LAB
ALBUMIN SERPL-MCNC: 4.2 G/DL (ref 3.5–5.2)
ALBUMIN/GLOB SERPL: 1.6 G/DL
ALP SERPL-CCNC: 95 U/L (ref 39–117)
ALT SERPL W P-5'-P-CCNC: 27 U/L (ref 1–33)
ANION GAP SERPL CALCULATED.3IONS-SCNC: 8.9 MMOL/L
AST SERPL-CCNC: 42 U/L (ref 1–32)
BASOPHILS # BLD AUTO: 0.02 10*3/MM3 (ref 0–0.2)
BASOPHILS NFR BLD AUTO: 0.6 % (ref 0–1.5)
BILIRUB SERPL-MCNC: 0.7 MG/DL (ref 0.1–1.2)
BUN BLD-MCNC: 13 MG/DL (ref 8–23)
BUN/CREAT SERPL: 15.1 (ref 7–25)
CALCIUM SPEC-SCNC: 9.7 MG/DL (ref 8.6–10.5)
CHLORIDE SERPL-SCNC: 106 MMOL/L (ref 98–107)
CO2 SERPL-SCNC: 29.1 MMOL/L (ref 22–29)
CREAT BLD-MCNC: 0.86 MG/DL (ref 0.57–1)
DEPRECATED RDW RBC AUTO: 47.2 FL (ref 37–54)
EOSINOPHIL # BLD AUTO: 0.13 10*3/MM3 (ref 0–0.7)
EOSINOPHIL NFR BLD AUTO: 4 % (ref 0.3–6.2)
ERYTHROCYTE [DISTWIDTH] IN BLOOD BY AUTOMATED COUNT: 14.4 % (ref 11.7–13)
GFR SERPL CREATININE-BSD FRML MDRD: 63 ML/MIN/1.73
GLOBULIN UR ELPH-MCNC: 2.7 GM/DL
GLUCOSE BLD-MCNC: 156 MG/DL (ref 65–99)
HCT VFR BLD AUTO: 42.2 % (ref 35.6–45.5)
HGB BLD-MCNC: 14 G/DL (ref 11.9–15.5)
IMM GRANULOCYTES # BLD: 0.01 10*3/MM3 (ref 0–0.03)
IMM GRANULOCYTES NFR BLD: 0.3 % (ref 0–0.5)
LYMPHOCYTES # BLD AUTO: 1.01 10*3/MM3 (ref 0.9–4.8)
LYMPHOCYTES NFR BLD AUTO: 31.1 % (ref 19.6–45.3)
MCH RBC QN AUTO: 29.7 PG (ref 26.9–32)
MCHC RBC AUTO-ENTMCNC: 33.2 G/DL (ref 32.4–36.3)
MCV RBC AUTO: 89.6 FL (ref 80.5–98.2)
MONOCYTES # BLD AUTO: 0.25 10*3/MM3 (ref 0.2–1.2)
MONOCYTES NFR BLD AUTO: 7.7 % (ref 5–12)
NEUTROPHILS # BLD AUTO: 1.83 10*3/MM3 (ref 1.9–8.1)
NEUTROPHILS NFR BLD AUTO: 56.3 % (ref 42.7–76)
NRBC BLD MANUAL-RTO: 0 /100 WBC (ref 0–0)
PLATELET # BLD AUTO: 83 10*3/MM3 (ref 140–500)
PMV BLD AUTO: 11.5 FL (ref 6–12)
POTASSIUM BLD-SCNC: 4.4 MMOL/L (ref 3.5–5.2)
PROT SERPL-MCNC: 6.9 G/DL (ref 6–8.5)
RBC # BLD AUTO: 4.71 10*6/MM3 (ref 3.9–5.2)
SODIUM BLD-SCNC: 144 MMOL/L (ref 136–145)
WBC NRBC COR # BLD: 3.25 10*3/MM3 (ref 4.5–10.7)

## 2018-03-01 PROCEDURE — 80053 COMPREHEN METABOLIC PANEL: CPT | Performed by: INTERNAL MEDICINE

## 2018-03-01 PROCEDURE — 85025 COMPLETE CBC W/AUTO DIFF WBC: CPT | Performed by: INTERNAL MEDICINE

## 2018-03-01 PROCEDURE — 99213 OFFICE O/P EST LOW 20 MIN: CPT | Performed by: INTERNAL MEDICINE

## 2018-03-01 PROCEDURE — 36415 COLL VENOUS BLD VENIPUNCTURE: CPT

## 2018-03-01 NOTE — PROGRESS NOTES
Subjective .     REASONS FOR FOLLOWUP:1.  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  Patient has no history of alcohol use.    HISTORY OF PRESENT ILLNESS:  The patient is a 82 y.o. year old female who is here for follow-up with the above-mentioned history.    History of Present Illness   he shouldn't is a 81-year-old female who was referred here for thrombocytopenia.  She has leukopenia and thrombocytopenia which has been stable.  Her white count goes anywhere from 3-4.  And her platelet count is anywhere from 70-90.  Today's her level is 79.   Her anti-scleroderma-70 antibodies is elevated to 5.0 which is significantly high as the normal lumbar 0-0.9.  MMA is 243.  RBC folate is normal.  ESR is normal.  SPEP does not show any monoclonal protein.  Rheumatoid factor is elevated to 57.  IPF is mildly elevated to 9.7.  Vitamin B12 is normal.  MMA is normal.    Patient has follow-up appointment with Dr. Peñaloza  Tomorrow to evaluate the cause of her cirrhosis of the liver as she is not an alcoholic.  She has SPLENOmegaly related to cirrhosis and her leukopenia thrombocytopenia secondary to splenomegaly    Patient was referred to Dr. Peñaloza and patient is diagnosed with non-alcoholic steatohepatitis.  She was also referred to Dr. Klaudia Gaitan but the did not keep her appointment.  Looking back at her blood counts are stable for more than a year.     Past Medical History:   Diagnosis Date   • Anemia    • Cirrhosis, non-alcoholic    • Gallbladder stone without cholecystitis or obstruction     gallstone seen on ultrasound 2007   • GERD (gastroesophageal reflux disease)    • Health care maintenance    • Hyperlipidemia    • Hypertension    • Lumbar canal stenosis    • Lumbar radiculopathy    • MI (myocardial infarction) 12/25/2016    NON-STEMI   • NSTEMI (non-ST elevated myocardial infarction)    • Osteoarthritis    • Thrombophlebitis of superficial veins of upper extremities     LEFT  "      ONCOLOGIC HISTORY:  (History from previous dates can be found in the separate document.)   patient is a 81-year-old female who was referred here for evaluation of thrombus cytopenia. She has a history of hypertension and hyperlipidemia.. Her CBC her hemoglobin was 13.0 white count of 3.56 platelet of 73 on May 31, 2017. Looking back even in December 2016 her hemoglobin was 9.2 white count of 7.57\" platelet of 98. She did drop her hemoglobin to 7.9. Today her hemoglobin is back up to 13.8. She has no complaints. She feels reasonably good and is active. She is not on any medications that can affect her platelet count. She is been on Plendil 4 years and we will check if Plendil can cause low platelet count and low white count. Rest of the drugs do not seem to affect her platelet count. She has no active bleeding. Patient underwent EGD and colonoscopy by Dr. Yeboah and apparently underwent EGD which showed chronic gastritis in December 2016. Colonoscopy showed evidence of multiple angiodysplasias in the ascending colon for which he patient underwent coagulation. Since then patient has not had GI bleeding. Patient also had a non-ST elevation myocardial infarction in March 2017. Which patient is on treatment with medications. Patient has some chronic fatigue otherwise feeling reasonably well. She has lost significant amount of weight in the last 6 months.    Chronic pancytopenia, hemoglobin improved now after patient underwent coagulation of angiodysplasia of the ascending colon. However the white count and the platelet count are still low. I do not believe any of her medications caused her counts to be low. Certainly we can obtain a B12, RBC folate, MIRANDA and rheumatoid factor as well as C-reactive protein and ESR. She'll also obtain a flow cytometry. On examination of the peripheral smear there is decreased platelet counts, there is no evidence of platelet clumps, no evidence of immature cells including blasts. I do " not appreciate a splenomegaly on examination. We did discuss about obtaining a bone marrow aspiration and biopsy to rule out underlying myelodysplasia. Patient does not want to go through       Current Outpatient Prescriptions on File Prior to Visit   Medication Sig Dispense Refill   • amLODIPine (NORVASC) 2.5 MG tablet Take 1 tablet by mouth Daily. 90 tablet 3   • atorvastatin (LIPITOR) 80 MG tablet TAKE 1 TABLET BY MOUTH EVERY NIGHT 90 tablet 1   • BIOTIN PO Take 1 tablet by mouth Daily.     • CALCIUM PO Take 2 tablets by mouth Daily.     • carvedilol (COREG) 3.125 MG tablet TAKE 1 TABLET BY MOUTH EVERY 12 HOURS 180 tablet 1   • Cholecalciferol (VITAMIN D-3 PO) Take 1 tablet by mouth Daily.     • docusate sodium (COLACE) 100 MG capsule Take 100 mg by mouth 2 (Two) Times a Day.     • ferrous sulfate 325 (65 FE) MG tablet TAKE 1 TABLET BY MOUTH DAILY WITH BREAKFAST 30 tablet 5   • HYDROcodone-acetaminophen (NORCO) 7.5-325 MG per tablet Take 1 tablet by mouth Every 6 (Six) Hours As Needed for Moderate Pain . 50 tablet 0   • Multiple Vitamins-Minerals (CENTRUM SILVER) tablet Take 1 tablet by mouth Daily.     • pantoprazole (PROTONIX) 40 MG EC tablet TAKE 1 TABLET BY MOUTH DAILY 30 tablet 5     No current facility-administered medications on file prior to visit.        ALLERGIES:   No Known Allergies    Social History     Social History   • Marital status:      Spouse name: N/A   • Number of children: N/A   • Years of education: College     Occupational History   • Secretarial Retired     Social History Main Topics   • Smoking status: Never Smoker   • Smokeless tobacco: Never Used   • Alcohol use No   • Drug use: No   • Sexual activity: Defer     Other Topics Concern   • Not on file     Social History Narrative         Cancer-related family history includes Cancer in her maternal grandmother; Liver cancer in her brother; Ovarian cancer in her daughter.     Review of Systems  A comprehensive 14 point review of  systems was performed and was negative except as mentioned.    Objective      There were no vitals filed for this visit.  Current Status 8/31/2017   ECOG score 1       Physical Exam    GENERAL:  Well-developed, well-nourished in no acute distress.   NECK:  Supple with good range of motion; no thyromegaly or masses, no JVD.  LYMPHATICS:  No cervical, supraclavicular, axillary or inguinal adenopathy.  CHEST:  Lungs clear to auscultation. Good airflow.  CARDIAC:  Regular rate and rhythm without murmurs, rubs or gallops. Normal S1,S2.  ABDOMEN:  Soft, nontender with no hepatosplenomegaly or masses.  EXTREMITIES:  No clubbing, cyanosis or edema.  NEUROLOGICAL:  Cranial Nerves II-XII grossly intact.  No focal neurological deficits.  PSYCHIATRIC:  Normal affect and mood.        RECENT LABS:  Hematology WBC   Date Value Ref Range Status   02/28/2018 3.66 (L) 5.00 - 10.00 10*3/mm3 Final     RBC   Date Value Ref Range Status   02/28/2018 4.80 3.93 - 5.22 10*6/mm3 Final     Hemoglobin   Date Value Ref Range Status   02/28/2018 14.5 11.2 - 15.7 g/dL Final     Hematocrit   Date Value Ref Range Status   02/28/2018 43.0 34.1 - 44.9 % Final     Platelets   Date Value Ref Range Status   02/28/2018 82 (C) 150 - 450 10*3/mm3 Final     Comment:     Verified with blue top tube.        Assessment/Plan   1.  Chronic pancytopenia, hemoglobin improved now after patient underwent coagulation of angiodysplasia of the ascending colon. However the white count and the platelet count are still low. I do not believe any of her medications caused her counts to be low. Looking at all labs patient has cirrhosis of the liver with splenomegaly as the cause for her pancytopenia.  Her labs are all negative except elevated rheumatoid factor and elevated and the scleroderma antibodies.  I'm unsure if there is an autoimmune component for her chronic pancytopenia.  We will refer her to both gastroenterology to evaluate the cause of her cirrhosis as she does  not drink alcohol and we will also refer her to rheumatology 2 be sure there is no underlying autoimmune disease as the cause of her low counts.    I have reviewed the CT scan with the patient.    Patient has not alcoholic steatohepatitis.  But stable pancytopenia.    Plan 1.  we'll release the patient from our office.  She will follow-up with her primary care physician's.  In future if she needs to see us for any reason we will be happy to see her.  At this time she is very stable.        Leyla Gutiérrez MD                Cc:  Crista Casillas MD

## 2018-03-12 ENCOUNTER — OFFICE (INPATIENT)
Dept: URBAN - METROPOLITAN AREA OTHER 6 | Facility: OTHER | Age: 83
End: 2018-03-12

## 2018-03-12 VITALS
SYSTOLIC BLOOD PRESSURE: 140 MMHG | HEART RATE: 60 BPM | HEIGHT: 63 IN | WEIGHT: 152 LBS | DIASTOLIC BLOOD PRESSURE: 80 MMHG

## 2018-03-12 DIAGNOSIS — K75.81 NONALCOHOLIC STEATOHEPATITIS (NASH): ICD-10-CM

## 2018-03-12 DIAGNOSIS — K74.69 OTHER CIRRHOSIS OF LIVER: ICD-10-CM

## 2018-03-12 PROCEDURE — 99212 OFFICE O/P EST SF 10 MIN: CPT | Performed by: INTERNAL MEDICINE

## 2018-05-29 ENCOUNTER — APPOINTMENT (OUTPATIENT)
Dept: GENERAL RADIOLOGY | Facility: HOSPITAL | Age: 83
End: 2018-05-29

## 2018-05-29 PROCEDURE — 73560 X-RAY EXAM OF KNEE 1 OR 2: CPT | Performed by: NURSE PRACTITIONER

## 2018-05-29 PROCEDURE — 70160 X-RAY EXAM OF NASAL BONES: CPT | Performed by: NURSE PRACTITIONER

## 2018-07-16 RX ORDER — FERROUS SULFATE 325(65) MG
TABLET ORAL
Qty: 30 TABLET | Refills: 5 | Status: SHIPPED | OUTPATIENT
Start: 2018-07-16 | End: 2019-01-10 | Stop reason: SDUPTHER

## 2018-07-17 ENCOUNTER — OFFICE VISIT (OUTPATIENT)
Dept: INTERNAL MEDICINE | Facility: CLINIC | Age: 83
End: 2018-07-17

## 2018-07-17 VITALS
HEART RATE: 70 BPM | BODY MASS INDEX: 26.28 KG/M2 | DIASTOLIC BLOOD PRESSURE: 74 MMHG | SYSTOLIC BLOOD PRESSURE: 148 MMHG | OXYGEN SATURATION: 96 % | WEIGHT: 150 LBS

## 2018-07-17 DIAGNOSIS — M48.061 SPINAL STENOSIS OF LUMBAR REGION, UNSPECIFIED WHETHER NEUROGENIC CLAUDICATION PRESENT: ICD-10-CM

## 2018-07-17 DIAGNOSIS — I10 ESSENTIAL HYPERTENSION: ICD-10-CM

## 2018-07-17 DIAGNOSIS — E78.5 HYPERLIPIDEMIA, UNSPECIFIED HYPERLIPIDEMIA TYPE: Primary | ICD-10-CM

## 2018-07-17 LAB
ALBUMIN SERPL-MCNC: 4.2 G/DL (ref 3.5–5.2)
ALBUMIN/GLOB SERPL: 1.8 G/DL
ALP SERPL-CCNC: 122 U/L (ref 39–117)
ALT SERPL-CCNC: 45 U/L (ref 1–33)
AST SERPL-CCNC: 61 U/L (ref 1–32)
BILIRUB SERPL-MCNC: 0.6 MG/DL (ref 0.1–1.2)
BUN SERPL-MCNC: 12 MG/DL (ref 8–23)
BUN/CREAT SERPL: 12.1 (ref 7–25)
CALCIUM SERPL-MCNC: 10 MG/DL (ref 8.6–10.5)
CHLORIDE SERPL-SCNC: 104 MMOL/L (ref 98–107)
CHOLEST SERPL-MCNC: 129 MG/DL (ref 0–200)
CO2 SERPL-SCNC: 28.1 MMOL/L (ref 22–29)
CREAT SERPL-MCNC: 0.99 MG/DL (ref 0.57–1)
DEPRECATED RDW RBC AUTO: 44.6 FL (ref 37–54)
ERYTHROCYTE [DISTWIDTH] IN BLOOD BY AUTOMATED COUNT: 14 % (ref 11.5–15)
GLOBULIN SER CALC-MCNC: 2.4 GM/DL
GLUCOSE SERPL-MCNC: 132 MG/DL (ref 65–99)
HCT VFR BLD AUTO: 40.9 % (ref 34.1–44.9)
HDLC SERPL-MCNC: 43 MG/DL (ref 40–60)
HGB BLD-MCNC: 14 G/DL (ref 11.2–15.7)
LDLC SERPL CALC-MCNC: 55 MG/DL (ref 0–100)
MCH RBC QN AUTO: 30.6 PG (ref 26–34)
MCHC RBC AUTO-ENTMCNC: 34.2 G/DL (ref 31–37)
MCV RBC AUTO: 89.5 FL (ref 80–100)
PLATELET # BLD AUTO: 83 10*3/MM3 (ref 150–450)
PMV BLD AUTO: 11.9 FL (ref 6–12)
POTASSIUM SERPL-SCNC: 4.3 MMOL/L (ref 3.5–5.2)
PROT SERPL-MCNC: 6.6 G/DL (ref 6–8.5)
RBC # BLD AUTO: 4.57 10*6/MM3 (ref 3.93–5.22)
SODIUM SERPL-SCNC: 145 MMOL/L (ref 136–145)
TRIGL SERPL-MCNC: 154 MG/DL (ref 0–150)
VLDLC SERPL-MCNC: 30.8 MG/DL (ref 5–40)
WBC NRBC COR # BLD: 4.08 10*3/MM3 (ref 5–10)

## 2018-07-17 PROCEDURE — 85027 COMPLETE CBC AUTOMATED: CPT | Performed by: NURSE PRACTITIONER

## 2018-07-17 PROCEDURE — 99214 OFFICE O/P EST MOD 30 MIN: CPT | Performed by: NURSE PRACTITIONER

## 2018-07-17 RX ORDER — HYDROCODONE BITARTRATE AND ACETAMINOPHEN 7.5; 325 MG/1; MG/1
1 TABLET ORAL EVERY 6 HOURS PRN
Qty: 50 TABLET | Refills: 0 | Status: SHIPPED | OUTPATIENT
Start: 2018-07-17 | End: 2018-12-12 | Stop reason: SDUPTHER

## 2018-07-17 NOTE — PROGRESS NOTES
Subjective   Pamela Durham is a 82 y.o. female.     She checks her blood pressure and readings less than 140/90. She does watch sodium intake. She does use a stationary bike. She goes to eye exam.        Hyperlipidemia   This is a chronic problem. The current episode started more than 1 year ago. The problem is controlled. Recent lipid tests were reviewed and are normal. Pertinent negatives include no chest pain, leg pain, myalgias or shortness of breath. Current antihyperlipidemic treatment includes statins. The current treatment provides significant improvement of lipids.   Hypertension   This is a chronic problem. The problem is controlled. Pertinent negatives include no anxiety, blurred vision, chest pain, headaches, orthopnea, palpitations, peripheral edema, PND or shortness of breath.        The following portions of the patient's history were reviewed and updated as appropriate: allergies, current medications, past family history, past medical history, past social history, past surgical history and problem list.    Review of Systems   Eyes: Negative for blurred vision.   Respiratory: Negative for shortness of breath.    Cardiovascular: Negative for chest pain, palpitations, orthopnea, leg swelling and PND.   Musculoskeletal: Positive for back pain (chronic, lower back ). Negative for myalgias.   Neurological: Positive for weakness (using walker ). Negative for dizziness and headaches.       Objective   Physical Exam   Constitutional: She is oriented to person, place, and time. She appears well-developed and well-nourished.   HENT:   Head: Normocephalic.   Nose: Nose normal.   Neck: Carotid bruit is not present. No thyroid mass and no thyromegaly present.   Cardiovascular: Regular rhythm and normal heart sounds.  Exam reveals no S3 and no S4.    No murmur heard.  Repeat bp left arm 122/76  No pedal edema    Pulmonary/Chest: Effort normal and breath sounds normal. She has no decreased breath sounds. She has no  wheezes. She has no rhonchi. She has no rales.   Musculoskeletal: She exhibits no edema.   Neurological: She is alert and oriented to person, place, and time. Gait (using rolling walker for assistance ) abnormal.   Skin: Skin is warm and dry.   Psychiatric: She has a normal mood and affect.       Assessment/Plan   Pamela was seen today for hyperlipidemia and hypertension.    Diagnoses and all orders for this visit:    Hyperlipidemia, unspecified hyperlipidemia type  -     Lipid Panel; Future    Essential hypertension  Comments:  low salt diet;   Orders:  -     Comprehensive Metabolic Panel; Future  -     CBC (No Diff); Future    Spinal stenosis of lumbar region, unspecified whether neurogenic claudication present  Comments:  uses hydrocodone sparingly  (once a week)     She was given information regarding shingrix

## 2018-07-17 NOTE — TELEPHONE ENCOUNTER
Dr. Street-Dr. Casillas'pt was in to see Moon this morning and she asked me to send you request to [facility].

## 2018-07-19 RX ORDER — PANTOPRAZOLE SODIUM 40 MG/1
TABLET, DELAYED RELEASE ORAL
Qty: 30 TABLET | Refills: 0 | Status: SHIPPED | OUTPATIENT
Start: 2018-07-19 | End: 2018-08-15 | Stop reason: SDUPTHER

## 2018-07-26 RX ORDER — CARVEDILOL 3.12 MG/1
TABLET ORAL
Qty: 180 TABLET | Refills: 1 | Status: SHIPPED | OUTPATIENT
Start: 2018-07-26 | End: 2019-01-16

## 2018-07-27 RX ORDER — ATORVASTATIN CALCIUM 80 MG/1
TABLET, FILM COATED ORAL
Qty: 90 TABLET | Refills: 3 | Status: SHIPPED | OUTPATIENT
Start: 2018-07-27 | End: 2019-01-16

## 2018-07-30 ENCOUNTER — OFFICE (INPATIENT)
Dept: URBAN - METROPOLITAN AREA CLINIC 66 | Facility: CLINIC | Age: 83
End: 2018-07-30

## 2018-07-30 VITALS
SYSTOLIC BLOOD PRESSURE: 112 MMHG | HEIGHT: 63 IN | WEIGHT: 151 LBS | DIASTOLIC BLOOD PRESSURE: 70 MMHG | HEART RATE: 72 BPM

## 2018-07-30 DIAGNOSIS — K74.69 OTHER CIRRHOSIS OF LIVER: ICD-10-CM

## 2018-07-30 DIAGNOSIS — K75.81 NONALCOHOLIC STEATOHEPATITIS (NASH): ICD-10-CM

## 2018-07-30 PROCEDURE — 99212 OFFICE O/P EST SF 10 MIN: CPT | Performed by: INTERNAL MEDICINE

## 2018-08-15 RX ORDER — PANTOPRAZOLE SODIUM 40 MG/1
TABLET, DELAYED RELEASE ORAL
Qty: 30 TABLET | Refills: 3 | Status: SHIPPED | OUTPATIENT
Start: 2018-08-15 | End: 2018-12-13 | Stop reason: SDUPTHER

## 2018-09-06 ENCOUNTER — APPOINTMENT (OUTPATIENT)
Dept: WOMENS IMAGING | Facility: HOSPITAL | Age: 83
End: 2018-09-06

## 2018-09-06 PROCEDURE — 77063 BREAST TOMOSYNTHESIS BI: CPT | Performed by: RADIOLOGY

## 2018-09-06 PROCEDURE — 77067 SCR MAMMO BI INCL CAD: CPT | Performed by: RADIOLOGY

## 2018-09-06 PROCEDURE — MDREVIEWSP: Performed by: RADIOLOGY

## 2018-11-26 ENCOUNTER — OFFICE VISIT (OUTPATIENT)
Dept: INTERNAL MEDICINE | Facility: CLINIC | Age: 83
End: 2018-11-26

## 2018-11-26 VITALS
HEIGHT: 63 IN | SYSTOLIC BLOOD PRESSURE: 138 MMHG | WEIGHT: 159 LBS | BODY MASS INDEX: 28.17 KG/M2 | DIASTOLIC BLOOD PRESSURE: 64 MMHG

## 2018-11-26 DIAGNOSIS — I10 ESSENTIAL HYPERTENSION: Primary | ICD-10-CM

## 2018-11-26 DIAGNOSIS — E78.5 HYPERLIPIDEMIA, UNSPECIFIED HYPERLIPIDEMIA TYPE: ICD-10-CM

## 2018-11-26 DIAGNOSIS — D69.6 THROMBOCYTOPENIA (HCC): ICD-10-CM

## 2018-11-26 LAB
ALBUMIN SERPL-MCNC: 3.8 G/DL (ref 3.5–5.2)
ALBUMIN/GLOB SERPL: 1.4 G/DL
ALP SERPL-CCNC: 130 U/L (ref 39–117)
ALT SERPL-CCNC: 27 U/L (ref 1–33)
AST SERPL-CCNC: 38 U/L (ref 1–32)
BILIRUB SERPL-MCNC: 0.7 MG/DL (ref 0.1–1.2)
BUN SERPL-MCNC: 17 MG/DL (ref 8–23)
BUN/CREAT SERPL: 17.2 (ref 7–25)
CALCIUM SERPL-MCNC: 9.6 MG/DL (ref 8.6–10.5)
CHLORIDE SERPL-SCNC: 104 MMOL/L (ref 98–107)
CHOLEST SERPL-MCNC: 100 MG/DL (ref 0–200)
CO2 SERPL-SCNC: 28.2 MMOL/L (ref 22–29)
CREAT SERPL-MCNC: 0.99 MG/DL (ref 0.57–1)
DEPRECATED RDW RBC AUTO: 45.5 FL (ref 37–54)
ERYTHROCYTE [DISTWIDTH] IN BLOOD BY AUTOMATED COUNT: 14.2 % (ref 11.5–15)
GLOBULIN SER CALC-MCNC: 2.8 GM/DL
GLUCOSE SERPL-MCNC: 138 MG/DL (ref 65–99)
HCT VFR BLD AUTO: 39.1 % (ref 34.1–44.9)
HDLC SERPL-MCNC: 40 MG/DL (ref 40–60)
HGB BLD-MCNC: 13.3 G/DL (ref 11.2–15.7)
LDLC SERPL CALC-MCNC: 41 MG/DL (ref 0–100)
MCH RBC QN AUTO: 30.7 PG (ref 26–34)
MCHC RBC AUTO-ENTMCNC: 34 G/DL (ref 31–37)
MCV RBC AUTO: 90.3 FL (ref 80–100)
PLATELET # BLD AUTO: 83 10*3/MM3 (ref 150–450)
PMV BLD AUTO: 11.3 FL (ref 6–12)
POTASSIUM SERPL-SCNC: 4.5 MMOL/L (ref 3.5–5.2)
PROT SERPL-MCNC: 6.6 G/DL (ref 6–8.5)
RBC # BLD AUTO: 4.33 10*6/MM3 (ref 3.93–5.22)
SODIUM SERPL-SCNC: 144 MMOL/L (ref 136–145)
TRIGL SERPL-MCNC: 96 MG/DL (ref 0–150)
VLDLC SERPL-MCNC: 19.2 MG/DL (ref 5–40)
WBC NRBC COR # BLD: 3.57 10*3/MM3 (ref 5–10)

## 2018-11-26 PROCEDURE — 85027 COMPLETE CBC AUTOMATED: CPT | Performed by: INTERNAL MEDICINE

## 2018-11-26 PROCEDURE — 99213 OFFICE O/P EST LOW 20 MIN: CPT | Performed by: INTERNAL MEDICINE

## 2018-11-26 NOTE — PROGRESS NOTES
Chief Complaint   Patient presents with   • Hyperlipidemia     4 month follow up   • Hypertension       Subjective   Pamela Durham is a 83 y.o. female.     Hyperlipidemia   This is a chronic problem. The problem is controlled. Recent lipid tests were reviewed and are variable. She has no history of diabetes or obesity. There are no known factors aggravating her hyperlipidemia. Pertinent negatives include no chest pain, leg pain, myalgias or shortness of breath. Current antihyperlipidemic treatment includes statins. The current treatment provides significant improvement of lipids. There are no compliance problems.    Hypertension   This is a chronic problem. The problem is controlled. Pertinent negatives include no blurred vision, chest pain, headaches, orthopnea, palpitations, peripheral edema, PND or shortness of breath. There are no associated agents to hypertension. Current antihypertension treatment includes calcium channel blockers and beta blockers. The current treatment provides moderate improvement. There are no compliance problems.  Hypertensive end-organ damage includes CAD/MI. There is no history of CVA.        The following portions of the patient's history were reviewed and updated as appropriate: allergies, current medications, past family history, past medical history, past social history, past surgical history and problem list.    Review of Systems   Constitutional: Positive for fatigue. Negative for appetite change.   HENT: Negative for nosebleeds.    Eyes: Negative for blurred vision and double vision.   Respiratory: Negative for cough and shortness of breath.    Cardiovascular: Negative for chest pain, palpitations, orthopnea, leg swelling and PND.   Musculoskeletal: Negative for myalgias.   Hematological: Does not bruise/bleed easily.         Current Outpatient Medications:   •  amLODIPine (NORVASC) 2.5 MG tablet, Take 1 tablet by mouth Daily., Disp: 90 tablet, Rfl: 3  •  atorvastatin (LIPITOR)  "80 MG tablet, TAKE 1 TABLET BY MOUTH EVERY NIGHT, Disp: 90 tablet, Rfl: 3  •  BIOTIN PO, Take 1 tablet by mouth Daily., Disp: , Rfl:   •  CALCIUM PO, Take 2 tablets by mouth Daily., Disp: , Rfl:   •  carvedilol (COREG) 3.125 MG tablet, TAKE 1 TABLET BY MOUTH EVERY 12 HOURS, Disp: 180 tablet, Rfl: 1  •  Cholecalciferol (VITAMIN D-3 PO), Take 1 tablet by mouth Daily., Disp: , Rfl:   •  docusate sodium (COLACE) 100 MG capsule, Take 100 mg by mouth 2 (Two) Times a Day., Disp: , Rfl:   •  ferrous sulfate 325 (65 FE) MG tablet, TAKE 1 TABLET BY MOUTH DAILY WITH BREAKFAST, Disp: 30 tablet, Rfl: 5  •  HYDROcodone-acetaminophen (NORCO) 7.5-325 MG per tablet, Take 1 tablet by mouth Every 6 (Six) Hours As Needed for Moderate Pain ., Disp: 50 tablet, Rfl: 0  •  Multiple Vitamins-Minerals (CENTRUM SILVER) tablet, Take 1 tablet by mouth Daily., Disp: , Rfl:   •  pantoprazole (PROTONIX) 40 MG EC tablet, TAKE 1 TABLET BY MOUTH DAILY, Disp: 30 tablet, Rfl: 3        Objective     /64 (BP Location: Right arm, Patient Position: Sitting, Cuff Size: Adult)   Ht 160.9 cm (63.35\")   Wt 72.1 kg (159 lb)   BMI 27.86 kg/m²     Physical Exam   Constitutional: She is oriented to person, place, and time. She appears well-developed and well-nourished. No distress.   Neck: Normal carotid pulses present. Carotid bruit is not present.   Cardiovascular: Regular rhythm, S1 normal and S2 normal. Exam reveals no gallop and no friction rub.   No murmur heard.  Pulses:       Carotid pulses are 2+ on the right side, and 2+ on the left side.  Pulmonary/Chest: Effort normal and breath sounds normal. She has no wheezes. She has no rhonchi. She has no rales. Chest wall is not dull to percussion.   Musculoskeletal: She exhibits no edema.   Neurological: She is alert and oriented to person, place, and time.   Skin: Skin is warm and dry.   Nursing note and vitals reviewed.        Assessment/Plan   Pamela was seen today for hyperlipidemia and " hypertension.    Diagnoses and all orders for this visit:    Essential hypertension  -     Comprehensive Metabolic Panel; Future  -     Lipid Panel; Future    Hyperlipidemia, unspecified hyperlipidemia type    Thrombocytopenia (CMS/HCC)  -     CBC (No Diff); Future      1/2/2019:  She is anticipating orthopedic surgery.  Based on my exam 11/26/2018, she is medically clear for surgery.

## 2018-12-12 RX ORDER — HYDROCODONE BITARTRATE AND ACETAMINOPHEN 7.5; 325 MG/1; MG/1
1 TABLET ORAL EVERY 6 HOURS PRN
Qty: 50 TABLET | Refills: 0 | Status: SHIPPED | OUTPATIENT
Start: 2018-12-12 | End: 2019-01-02 | Stop reason: SDUPTHER

## 2018-12-12 NOTE — TELEPHONE ENCOUNTER
----- Message from Mel Celaya sent at 12/12/2018 11:22 AM EST -----  Contact: pt  Pt calling would like refill on     RX: HYDROcodone-acetaminophen (NORCO) 7.5-325 MG per tablet    Please call when ready for pickup      Pt#[phone]     Advised 2-3 days before refill is ready and WE WILL CALL

## 2018-12-13 RX ORDER — PANTOPRAZOLE SODIUM 40 MG/1
TABLET, DELAYED RELEASE ORAL
Qty: 30 TABLET | Refills: 5 | Status: SHIPPED | OUTPATIENT
Start: 2018-12-13 | End: 2019-01-16

## 2018-12-19 ENCOUNTER — OFFICE VISIT (OUTPATIENT)
Dept: ORTHOPEDIC SURGERY | Facility: CLINIC | Age: 83
End: 2018-12-19

## 2018-12-19 VITALS — TEMPERATURE: 98.4 F | BODY MASS INDEX: 27.21 KG/M2 | HEIGHT: 63 IN | WEIGHT: 153.6 LBS

## 2018-12-19 DIAGNOSIS — M16.12 ARTHRITIS OF LEFT HIP: Primary | ICD-10-CM

## 2018-12-19 PROCEDURE — 99214 OFFICE O/P EST MOD 30 MIN: CPT | Performed by: ORTHOPAEDIC SURGERY

## 2018-12-19 RX ORDER — CEFAZOLIN SODIUM 2 G/100ML
2 INJECTION, SOLUTION INTRAVENOUS ONCE
Status: CANCELLED | OUTPATIENT
Start: 2019-01-23 | End: 2018-12-19

## 2018-12-20 NOTE — PROGRESS NOTES
Patient Name: Pamela Durham   YOB: 1935  Referring Primary Care Physician: Crista Casillas MD  BMI: Body mass index is 27.21 kg/m².    Chief Complaint:    Chief Complaint   Patient presents with   • Left Leg - Establish Care, Pain        Subjective:    HPI:   Pamela Durham is a pleasant 83 y.o. year old who presents today for evaluation of   Chief Complaint   Patient presents with   • Left Leg - Establish Care, Pain    (Location). Duration: This has been going on for years. and but worse over the last month or so. hard to walk without walker, taking some pain meds sparingly. Timing: The pain is constant. and acute. Context or causative factors: unknown.  Relieving Factors:  In the past has tried prescription NSAIDS  controlled substances  activtiy modification  assistive devices  ice/heat/rest.   Saw Dr Maradiaga last week but rememberd that I did hips and wanted to come here.  Successful wanda years ago on the right.    This problem is new to this examiner.     Medications:   Home Medications:  Current Outpatient Medications on File Prior to Visit   Medication Sig   • amLODIPine (NORVASC) 2.5 MG tablet Take 1 tablet by mouth Daily.   • atorvastatin (LIPITOR) 80 MG tablet TAKE 1 TABLET BY MOUTH EVERY NIGHT   • BIOTIN PO Take 1 tablet by mouth Daily.   • CALCIUM PO Take 2 tablets by mouth Daily.   • carvedilol (COREG) 3.125 MG tablet TAKE 1 TABLET BY MOUTH EVERY 12 HOURS   • Cholecalciferol (VITAMIN D-3 PO) Take 1 tablet by mouth Daily.   • docusate sodium (COLACE) 100 MG capsule Take 100 mg by mouth 2 (Two) Times a Day.   • ferrous sulfate 325 (65 FE) MG tablet TAKE 1 TABLET BY MOUTH DAILY WITH BREAKFAST   • HYDROcodone-acetaminophen (NORCO) 7.5-325 MG per tablet Take 1 tablet by mouth Every 6 (Six) Hours As Needed for Moderate Pain .   • Multiple Vitamins-Minerals (CENTRUM SILVER) tablet Take 1 tablet by mouth Daily.   • pantoprazole (PROTONIX) 40 MG EC tablet TAKE 1 TABLET BY MOUTH DAILY     No current  facility-administered medications on file prior to visit.      Current Medications:  Scheduled Meds:  Continuous Infusions:  No current facility-administered medications for this visit.   PRN Meds:.    I have reviewed the patient's medical history in detail and updated the computerized patient record.  Review and summarization of old records includes:    Past Medical History:   Diagnosis Date   • Anemia    • Cirrhosis, non-alcoholic (CMS/[facility])    • Gallbladder stone without cholecystitis or obstruction     gallstone seen on ultrasound 2007   • GERD (gastroesophageal reflux disease)    • Health care maintenance    • Hyperlipidemia    • Hypertension    • Lumbar canal stenosis    • Lumbar radiculopathy    • MI (myocardial infarction) (CMS/[facility]) 12/25/2016    NON-STEMI   • NSTEMI (non-ST elevated myocardial infarction) (CMS/[facility])    • Osteoarthritis    • Thrombophlebitis of superficial veins of upper extremities     LEFT        Past Surgical History:   Procedure Laterality Date   • CARDIAC CATHETERIZATION N/A 3/1/2017    Procedure: Coronary angiography;  Surgeon: Desean Earl MD;  Location: [facility] CATH INVASIVE LOCATION;  Service:    • COLONOSCOPY N/A 12/27/2016    Procedure: COLONOSCOPY INTO CECUM WITH ARGON PLASMA COAGULATION;  Surgeon: Javier Peñaloza MD;  Location: [facility] ENDOSCOPY;  Service:    • ENDOSCOPY N/A 12/27/2016    Procedure: ESOPHAGOGASTRODUODENOSCOPY WITH COLD BIOPSIES;  Surgeon: Javier Peñaloza MD;  Location: [facility] ENDOSCOPY;  Service:    • HIP ARTHROPLASTY Right 04/01/2015   • HYSTERECTOMY  1986   • KNEE SURGERY Bilateral     2007 & 2008   • LUNG SURGERY  1965   • TONSILLECTOMY          Social History     Occupational History   • Occupation: Secretarial     Employer: RETIRED   Tobacco Use   • Smoking status: Never Smoker   • Smokeless tobacco: Never Used   Substance and Sexual Activity   • Alcohol use: No   • Drug use: No   • Sexual activity: Defer    Social History     Social History Narrative  "  • Not on file        Family History   Problem Relation Age of Onset   • Hypertension Other    • Heart disease Other    • Liver cancer Brother    • Ovarian cancer Daughter         diagnosed 2012   • No Known Problems Mother    • Heart disease Father    • Heart attack Father    • Hypertension Father    • Cancer Maternal Grandmother    • No Known Problems Maternal Grandfather    • No Known Problems Paternal Grandmother    • No Known Problems Paternal Grandfather        ROS: 14 point review of systems was performed and all other systems were reviewed and are negative except for documented findings in HPI and today's encounter.     Allergies: No Known Allergies  Constitutional:  Denies fever, shaking or chills   Eyes:  Denies change in visual acuity   HENT:  Denies nasal congestion or sore throat   Respiratory:  Denies cough or shortness of breath   Cardiovascular:  Denies chest pain or severe LE edema   GI:  Denies abdominal pain, nausea, vomiting, bloody stools or diarrhea   Musculoskeletal:  Numbness, tingling, pain, or loss of motor function only as noted above in history of present illness.  : Denies painful urination or hematuria  Integument:  Denies rash, lesion or ulceration   Neurologic:  Denies headache or focal weakness  Endocrine:  Denies lymphadenopathy  Psych:  Denies confusion or change in mental status   Hem:  Denies active bleeding    Subjective     Objective:    Physical Exam: 83 y.o. female  Wt Readings from Last 3 Encounters:   12/19/18 69.7 kg (153 lb 9.6 oz)   11/26/18 72.1 kg (159 lb)   07/17/18 68 kg (150 lb)     Ht Readings from Last 3 Encounters:   12/19/18 160 cm (63\")   11/26/18 160.9 cm (63.35\")   03/01/18 160.9 cm (63.35\")     Body mass index is 27.21 kg/m².    Vitals:    12/19/18 1600   Temp: 98.4 °F (36.9 °C)       Vital signs reviewed.   General Appearance:    Alert, cooperative, in no acute distress                  Eyes: conjunctiva clear  ENT: external ears and nose atraumatic  CV: " no peripheral edema  Resp: normal respiratory effort  Skin: no rashes or wounds; normal turgor  Psych: mood and affect appropriate  Lymph: no nodes appreciated  Neuro: gross sensation intact  Vascular:  Palpable peripheral pulse in noted extremity  Musculoskeletal Extremities: HIP Exam: antalgic and Trendelenburg gait with assistive device left hip, Stinchfield postitive, pain with internal rotation, stiffness, guteal pain and radiates 2+ pedal pulses and brisk capillary refill Pedal edema 1+      Radiology:   Imaging done by outside location, images were personally viewed and discussed at length with the patient:    Indication: pain related symptoms,  Views: 2V AP&LAT bilateral hip(s)   Findings: severe end-stage arthritis (bone on bone, subchondral sclerosis/cysts, osteophytes), S/P right  Total Hip Replacement in good position and alignment  Comparison views: not available      Assessment:     ICD-10-CM ICD-9-CM   1. Arthritis of left hip M16.12 716.95        Procedures       Plan: Biomechanics of pertinent body area discussed.  Risks, benefits, alternatives, comparisons, and complications of accepted medicines, injections, recommendations, surgical procedures, and therapies explained and education provided in laymen's terms. The patient was given the opportunity to ask questions and they were answerved to their satisfaction.   Natural history and expected course of this patient's diagnosis discussed along with evaluation of therapies. Questions answered.  Advised on strengthening exercises, stressed importance of these with progression of arthritis, demonstrated exercises to patient with repeat demonstration and verb of understanding.   Reviewed medical records from other provider(s) for past and current medical history pertinent to this complaint:  PCP/ancillary staff and Care Everywhere  SINDY: Obtain medical clearance for surgery and Obtain Cardiac clearance for surgery. Continuation of conservative management  vs. SINDY discussed.  I reviewed anatomy of a total hip arthroplasty in laymen's terms, as well as typical postoperative recovery and possibly 6-12 months for maximal recovery, and possible need for rehabilitation stay after hospitalization. We also discussed risks, benefits, alternatives, and limitations of procedure with risks including but not limited to neurovascular damage, bleeding, infection, malalignment, chronic pian, failure of implants, periprosthetic fracture, osteolysis, loosening of implants, loss of motion, weakness, stiffness, instability, DVT, pulmonary embolus, death, stroke, complex regional pain syndrome, myocardial infarction, and need for additional procedures. Concept of substitution vs. replacement discussed.  No guarantees were given regarding results of surgery.  Patient verbalized understanding, and was given the opportunity to ask and have all questions answered today.   Biomechanics and proper use of ambulatory aids:  crutches, walker, cane, &/or trekking poles.  Discussed Narcotic pain medicine with addiction precautions and weaning instructions, pt verb understanding of instructions and signed narcotic contract verifying agreement to correct use of narcotics. A RAHEEM check will be made on-line, and will be repeated if prescription is renewed after a 90 day period. The patient agrees to adhering to the medication regimen as prescribed.   Could do sparing refills of pain meds if needed.  Consults for cardiology and hematology.  Has plt count of 83 over time and will need surgical levels.  Reviewed record  12/20/2018

## 2018-12-27 ENCOUNTER — OFFICE VISIT (OUTPATIENT)
Dept: ONCOLOGY | Facility: CLINIC | Age: 83
End: 2018-12-27

## 2018-12-27 ENCOUNTER — APPOINTMENT (OUTPATIENT)
Dept: OTHER | Facility: HOSPITAL | Age: 83
End: 2018-12-27

## 2018-12-27 VITALS
WEIGHT: 148 LBS | SYSTOLIC BLOOD PRESSURE: 150 MMHG | HEART RATE: 82 BPM | HEIGHT: 63 IN | BODY MASS INDEX: 26.22 KG/M2 | RESPIRATION RATE: 16 BRPM | DIASTOLIC BLOOD PRESSURE: 75 MMHG | TEMPERATURE: 97.8 F | OXYGEN SATURATION: 95 %

## 2018-12-27 DIAGNOSIS — R53.1 GENERAL WEAKNESS: Primary | ICD-10-CM

## 2018-12-27 DIAGNOSIS — M54.16 LUMBAR RADICULOPATHY: ICD-10-CM

## 2018-12-27 DIAGNOSIS — K74.60 CIRRHOSIS, NON-ALCOHOLIC (HCC): ICD-10-CM

## 2018-12-27 DIAGNOSIS — R91.1 LUNG NODULE: ICD-10-CM

## 2018-12-27 DIAGNOSIS — I80.8 SUPERFICIAL THROMBOPHLEBITIS OF LEFT UPPER EXTREMITY: Primary | ICD-10-CM

## 2018-12-27 LAB
BASOPHILS # BLD AUTO: 0.03 10*3/MM3 (ref 0–0.2)
BASOPHILS # BLD AUTO: 0.04 10*3/MM3 (ref 0–0.2)
BASOPHILS NFR BLD AUTO: 0.6 % (ref 0–1.5)
BASOPHILS NFR BLD AUTO: 0.7 % (ref 0–1.5)
DEPRECATED RDW RBC AUTO: 45.5 FL (ref 37–54)
DEPRECATED RDW RBC AUTO: 45.5 FL (ref 37–54)
EOSINOPHIL # BLD AUTO: 0.14 10*3/MM3 (ref 0–0.7)
EOSINOPHIL # BLD AUTO: 0.18 10*3/MM3 (ref 0–0.7)
EOSINOPHIL NFR BLD AUTO: 2.8 % (ref 0.3–6.2)
EOSINOPHIL NFR BLD AUTO: 3.3 % (ref 0.3–6.2)
ERYTHROCYTE [DISTWIDTH] IN BLOOD BY AUTOMATED COUNT: 14.4 % (ref 11.7–13)
ERYTHROCYTE [DISTWIDTH] IN BLOOD BY AUTOMATED COUNT: 14.4 % (ref 11.7–13)
HCT VFR BLD AUTO: 39.4 % (ref 35.6–45.5)
HCT VFR BLD AUTO: 40.5 % (ref 35.6–45.5)
HGB BLD-MCNC: 13.8 G/DL (ref 11.9–15.5)
HGB BLD-MCNC: 13.8 G/DL (ref 11.9–15.5)
IMM GRANULOCYTES # BLD AUTO: 0.02 10*3/MM3 (ref 0–0.03)
IMM GRANULOCYTES # BLD AUTO: 0.03 10*3/MM3 (ref 0–0.03)
IMM GRANULOCYTES NFR BLD AUTO: 0.4 % (ref 0–0.5)
IMM GRANULOCYTES NFR BLD AUTO: 0.6 % (ref 0–0.5)
LYMPHOCYTES # BLD AUTO: 0.86 10*3/MM3 (ref 0.9–4.8)
LYMPHOCYTES # BLD AUTO: 1.26 10*3/MM3 (ref 0.9–4.8)
LYMPHOCYTES NFR BLD AUTO: 17.3 % (ref 19.6–45.3)
LYMPHOCYTES NFR BLD AUTO: 22.9 % (ref 19.6–45.3)
MCH RBC QN AUTO: 29.6 PG (ref 26.9–32)
MCH RBC QN AUTO: 30.4 PG (ref 26.9–32)
MCHC RBC AUTO-ENTMCNC: 34.1 G/DL (ref 32.4–36.3)
MCHC RBC AUTO-ENTMCNC: 35 G/DL (ref 32.4–36.3)
MCV RBC AUTO: 86.7 FL (ref 80.5–98.2)
MCV RBC AUTO: 86.8 FL (ref 80.5–98.2)
MONOCYTES # BLD AUTO: 0.4 10*3/MM3 (ref 0.2–1.2)
MONOCYTES # BLD AUTO: 0.47 10*3/MM3 (ref 0.2–1.2)
MONOCYTES NFR BLD AUTO: 8 % (ref 5–12)
MONOCYTES NFR BLD AUTO: 8.5 % (ref 5–12)
NEUTROPHILS # BLD AUTO: 3.51 10*3/MM3 (ref 1.9–8.1)
NEUTROPHILS # BLD AUTO: 3.54 10*3/MM3 (ref 1.9–8.1)
NEUTROPHILS NFR BLD AUTO: 64.2 % (ref 42.7–76)
NEUTROPHILS NFR BLD AUTO: 70.7 % (ref 42.7–76)
NRBC BLD AUTO-RTO: 0 /100 WBC (ref 0–0)
NRBC BLD AUTO-RTO: 0 /100 WBC (ref 0–0)
PLATELET # BLD AUTO: 132 10*3/MM3 (ref 140–500)
PLATELET # BLD AUTO: 152 10*3/MM3 (ref 140–500)
PMV BLD AUTO: 10.6 FL (ref 6–12)
PMV BLD AUTO: 11 FL (ref 6–12)
RBC # BLD AUTO: 4.54 10*6/MM3 (ref 3.9–5.2)
RBC # BLD AUTO: 4.67 10*6/MM3 (ref 3.9–5.2)
WBC NRBC COR # BLD: 4.97 10*3/MM3 (ref 4.5–10.7)
WBC NRBC COR # BLD: 5.51 10*3/MM3 (ref 4.5–10.7)

## 2018-12-27 PROCEDURE — 99214 OFFICE O/P EST MOD 30 MIN: CPT | Performed by: INTERNAL MEDICINE

## 2018-12-27 PROCEDURE — 36415 COLL VENOUS BLD VENIPUNCTURE: CPT

## 2018-12-27 PROCEDURE — 85025 COMPLETE CBC W/AUTO DIFF WBC: CPT | Performed by: INTERNAL MEDICINE

## 2018-12-27 NOTE — PROGRESS NOTES
"  Subjective .     REASONS FOR FOLLOWUP:1.  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     HISTORY OF PRESENT ILLNESS:  The patient is a 83 y.o. year old female who is here for surgical clearance with regards to the blood counts as she awaits hip replacement by Dr Domenico Peñaloza    History of Present Illness   She has leukopenia and thrombocytopenia which has been stable.  Her white count goes anywhere from 3-4.  And her platelet count is anywhere from 70-90.  Today's her level is 150...repeated and 132. Her WBC count is normal as well. Both numbers prompted repeat testing as they were \"Too good to be true\"    .   Her anti-scleroderma-70 antibodies is elevated to 5.0 which is significantly high as the normal lumbar 0-0.9.  MMA is 243.  RBC folate is normal.  ESR is normal.  SPEP does not show any monoclonal protein.  Rheumatoid factor is elevated to 57.  IPF is mildly elevated to 9.7.  Vitamin B12 is normal.  MMA is normal.    Patient was referred to Dr. Peñaloza and patient is diagnosed with non-alcoholic steatohepatitis.  She was also referred to Dr. Klaudia Gaitan but the did not keep her appointment.  Looking back at her blood counts are stable for more than a year.      She is now ready for hip surgery though her counts are ridiculously good.    Past Medical History:   Diagnosis Date   • Anemia    • Cirrhosis, non-alcoholic (CMS/HCC)    • Gallbladder stone without cholecystitis or obstruction     gallstone seen on ultrasound 2007   • GERD (gastroesophageal reflux disease)    • Health care maintenance    • Hyperlipidemia    • Hypertension    • Lumbar canal stenosis    • Lumbar radiculopathy    • MI (myocardial infarction) (CMS/[facility]) 12/25/2016    NON-STEMI   • NSTEMI (non-ST elevated myocardial infarction) (CMS/HCC)    • Osteoarthritis    • Thrombophlebitis of superficial veins of upper extremities     LEFT       ONCOLOGIC HISTORY:  (History from previous dates can be found " "in the separate document.)   patient is a 81-year-old female who was referred here for evaluation of thrombus cytopenia. She has a history of hypertension and hyperlipidemia.. Her CBC her hemoglobin was 13.0 white count of 3.56 platelet of 73 on May 31, 2017. Looking back even in December 2016 her hemoglobin was 9.2 white count of 7.57\" platelet of 98. She did drop her hemoglobin to 7.9. Today her hemoglobin is back up to 13.8. She has no complaints. She feels reasonably good and is active. She is not on any medications that can affect her platelet count. She is been on Plendil 4 years and we will check if Plendil can cause low platelet count and low white count. Rest of the drugs do not seem to affect her platelet count. She has no active bleeding. Patient underwent EGD and colonoscopy by Dr. Yeboah and apparently underwent EGD which showed chronic gastritis in December 2016. Colonoscopy showed evidence of multiple angiodysplasias in the ascending colon for which he patient underwent coagulation. Since then patient has not had GI bleeding. Patient also had a non-ST elevation myocardial infarction in March 2017. Which patient is on treatment with medications. Patient has some chronic fatigue otherwise feeling reasonably well. She has lost significant amount of weight in the last 6 months.    Chronic pancytopenia, hemoglobin improved now after patient underwent coagulation of angiodysplasia of the ascending colon. However the white count and the platelet count are still low. I do not believe any of her medications caused her counts to be low. Certainly we can obtain a B12, RBC folate, MIRANDA and rheumatoid factor as well as C-reactive protein and ESR. She'll also obtain a flow cytometry. On examination of the peripheral smear there is decreased platelet counts, there is no evidence of platelet clumps, no evidence of immature cells including blasts. I do not appreciate a splenomegaly on examination. We did discuss about " obtaining a bone marrow aspiration and biopsy to rule out underlying myelodysplasia. Patient does not want to go through       Current Outpatient Medications on File Prior to Visit   Medication Sig Dispense Refill   • amLODIPine (NORVASC) 2.5 MG tablet Take 1 tablet by mouth Daily. 90 tablet 3   • atorvastatin (LIPITOR) 80 MG tablet TAKE 1 TABLET BY MOUTH EVERY NIGHT 90 tablet 3   • BIOTIN PO Take 1 tablet by mouth Daily.     • CALCIUM PO Take 2 tablets by mouth Daily.     • carvedilol (COREG) 3.125 MG tablet TAKE 1 TABLET BY MOUTH EVERY 12 HOURS 180 tablet 1   • Cholecalciferol (VITAMIN D-3 PO) Take 1 tablet by mouth Daily.     • docusate sodium (COLACE) 100 MG capsule Take 100 mg by mouth 2 (Two) Times a Day.     • ferrous sulfate 325 (65 FE) MG tablet TAKE 1 TABLET BY MOUTH DAILY WITH BREAKFAST 30 tablet 5   • HYDROcodone-acetaminophen (NORCO) 7.5-325 MG per tablet Take 1 tablet by mouth Every 6 (Six) Hours As Needed for Moderate Pain . 50 tablet 0   • Multiple Vitamins-Minerals (CENTRUM SILVER) tablet Take 1 tablet by mouth Daily.     • pantoprazole (PROTONIX) 40 MG EC tablet TAKE 1 TABLET BY MOUTH DAILY 30 tablet 5     No current facility-administered medications on file prior to visit.        ALLERGIES:   No Known Allergies    Social History     Socioeconomic History   • Marital status:      Spouse name: Not on file   • Number of children: Not on file   • Years of education: College   • Highest education level: Not on file   Social Needs   • Financial resource strain: Not on file   • Food insecurity - worry: Not on file   • Food insecurity - inability: Not on file   • Transportation needs - medical: Not on file   • Transportation needs - non-medical: Not on file   Occupational History   • Occupation: Secretarial     Employer: RETIRED   Tobacco Use   • Smoking status: Never Smoker   • Smokeless tobacco: Never Used   Substance and Sexual Activity   • Alcohol use: No   • Drug use: No   • Sexual activity:  "Defer   Other Topics Concern   • Not on file   Social History Narrative   • Not on file         Cancer-related family history includes Cancer in her maternal grandmother; Liver cancer in her brother; Ovarian cancer in her daughter.     Review of Systems  A comprehensive 14 point review of systems was performed and was negative except as mentioned.    Objective      Vitals:    12/27/18 0844   BP: 150/75   Pulse: 82   Resp: 16   Temp: 97.8 °F (36.6 °C)   SpO2: 95%   Weight: 67.1 kg (148 lb)   Height: 160.9 cm (63.35\")   PainSc: 10-Worst pain ever   PainLoc: Hip  Comment: left hip     Current Status 12/27/2018   ECOG score 2       Physical Exam    GENERAL:  Well-developed, well-nourished in no acute distress.   NECK:  Supple with good range of motion; no thyromegaly or masses, no JVD.  LYMPHATICS:  No cervical, supraclavicular, axillary or inguinal adenopathy.  CHEST:  Lungs clear to auscultation. Good airflow.  CARDIAC:  Regular rate and rhythm without murmurs, rubs or gallops. Normal S1,S2.  ABDOMEN:  Soft, nontender with no hepatosplenomegaly or masses.  EXTREMITIES:  No clubbing, cyanosis or edema.  NEUROLOGICAL:  Cranial Nerves II-XII grossly intact.  No focal neurological deficits.  PSYCHIATRIC:  Normal affect and mood.        RECENT LABS:  Hematology WBC   Date Value Ref Range Status   12/27/2018 4.97 4.50 - 10.70 10*3/mm3 Final     RBC   Date Value Ref Range Status   12/27/2018 4.54 3.90 - 5.20 10*6/mm3 Final     Hemoglobin   Date Value Ref Range Status   12/27/2018 13.8 11.9 - 15.5 g/dL Final     Hematocrit   Date Value Ref Range Status   12/27/2018 39.4 35.6 - 45.5 % Final     Platelets   Date Value Ref Range Status   12/27/2018 132 (L) 140 - 500 10*3/mm3 Final        Assessment/Plan    Chronic pancytopenia, hemoglobin improved now after patient underwent coagulation of angiodysplasia of the ascending colon.   We will repeat CBC in 2 weeks and then at 4 weeks     Patient has non- alcoholic steatohepatitis.  " But stable pancytopenia.      A communication is submitted to Dr Domenico Peñaloza.

## 2019-01-02 ENCOUNTER — TELEPHONE (OUTPATIENT)
Dept: INTERNAL MEDICINE | Facility: CLINIC | Age: 84
End: 2019-01-02

## 2019-01-02 ENCOUNTER — TELEPHONE (OUTPATIENT)
Dept: CARDIOLOGY | Facility: CLINIC | Age: 84
End: 2019-01-02

## 2019-01-02 RX ORDER — HYDROCODONE BITARTRATE AND ACETAMINOPHEN 7.5; 325 MG/1; MG/1
1 TABLET ORAL EVERY 6 HOURS PRN
Qty: 50 TABLET | Refills: 0 | Status: SHIPPED | OUTPATIENT
Start: 2019-01-02 | End: 2019-01-28 | Stop reason: HOSPADM

## 2019-01-02 NOTE — TELEPHONE ENCOUNTER
----- Message from Caitlyn Quijano sent at 1/2/2019 10:01 AM EST -----  Contact: patient and son  Ms. Durham came in this morning.  Thought she had an appointment but it actually it on this Friday, 01/04/2019, for surgery clearance.  She stated that she saw Dr. Casillas on 11/26/2018 and wasn't due back until May so wonders does she need to come in Friday for surgery clearance?  Please advise.    [phone] (Jesus, her son)    Is always requesting a refill on her Hydrocodone (Norco)7.5/325.    Please advise on these.    Thank you,    Caitlyn

## 2019-01-02 NOTE — TELEPHONE ENCOUNTER
Pt's son called. He asked if you would be willing to clear her for her hip replacement surgery. Without seeing us tomorrow. She isn't due to see us for a follow up until 2/2019.   We saw her last on 2/23/18. And she is having significant hip pain and doesn't think she can make it to the appt. She also denies and other symptoms.  Please advise.    Tonia

## 2019-01-02 NOTE — TELEPHONE ENCOUNTER
Discussed with her son.  Advised him that based on my examination on 11/26/2018, she is medically cleared for surgery from a medical standpoint.  She has already had hematology clearance and she is scheduled for cardiology clearance on January 3, 2019.  I put an addendum on her office note of 11/26/2018 and forwarded it to Dr. Anabela Torres prescription printed and advised him he could pick it up tomorrow.

## 2019-01-09 PROBLEM — M16.12 ARTHRITIS OF LEFT HIP: Status: ACTIVE | Noted: 2019-01-09

## 2019-01-10 RX ORDER — FERROUS SULFATE 325(65) MG
TABLET ORAL
Qty: 30 TABLET | Refills: 4 | Status: SHIPPED | OUTPATIENT
Start: 2019-01-10 | End: 2019-01-16

## 2019-01-11 ENCOUNTER — APPOINTMENT (OUTPATIENT)
Dept: OTHER | Facility: HOSPITAL | Age: 84
End: 2019-01-11

## 2019-01-11 DIAGNOSIS — D70.9 NEUTROPENIA, UNSPECIFIED TYPE (HCC): Primary | ICD-10-CM

## 2019-01-11 LAB
BASOPHILS # BLD AUTO: 0.03 10*3/MM3 (ref 0–0.2)
BASOPHILS NFR BLD AUTO: 0.7 % (ref 0–1.5)
DEPRECATED RDW RBC AUTO: 46.9 FL (ref 37–54)
EOSINOPHIL # BLD AUTO: 0.11 10*3/MM3 (ref 0–0.7)
EOSINOPHIL NFR BLD AUTO: 2.5 % (ref 0.3–6.2)
ERYTHROCYTE [DISTWIDTH] IN BLOOD BY AUTOMATED COUNT: 14.6 % (ref 11.7–13)
HCT VFR BLD AUTO: 40.4 % (ref 35.6–45.5)
HGB BLD-MCNC: 13.7 G/DL (ref 11.9–15.5)
IMM GRANULOCYTES # BLD AUTO: 0.02 10*3/MM3 (ref 0–0.03)
IMM GRANULOCYTES NFR BLD AUTO: 0.5 % (ref 0–0.5)
LYMPHOCYTES # BLD AUTO: 1 10*3/MM3 (ref 0.9–4.8)
LYMPHOCYTES NFR BLD AUTO: 22.9 % (ref 19.6–45.3)
MCH RBC QN AUTO: 29.7 PG (ref 26.9–32)
MCHC RBC AUTO-ENTMCNC: 33.9 G/DL (ref 32.4–36.3)
MCV RBC AUTO: 87.4 FL (ref 80.5–98.2)
MONOCYTES # BLD AUTO: 0.46 10*3/MM3 (ref 0.2–1.2)
MONOCYTES NFR BLD AUTO: 10.5 % (ref 5–12)
NEUTROPHILS # BLD AUTO: 2.75 10*3/MM3 (ref 1.9–8.1)
NEUTROPHILS NFR BLD AUTO: 62.9 % (ref 42.7–76)
NRBC BLD AUTO-RTO: 0 /100 WBC (ref 0–0)
PLATELET # BLD AUTO: 115 10*3/MM3 (ref 140–500)
PMV BLD AUTO: 11.5 FL (ref 6–12)
RBC # BLD AUTO: 4.62 10*6/MM3 (ref 3.9–5.2)
WBC NRBC COR # BLD: 4.37 10*3/MM3 (ref 4.5–10.7)

## 2019-01-11 PROCEDURE — 85025 COMPLETE CBC W/AUTO DIFF WBC: CPT | Performed by: INTERNAL MEDICINE

## 2019-01-16 ENCOUNTER — APPOINTMENT (OUTPATIENT)
Dept: PREADMISSION TESTING | Facility: HOSPITAL | Age: 84
End: 2019-01-16

## 2019-01-16 VITALS
BODY MASS INDEX: 24.88 KG/M2 | HEIGHT: 63 IN | WEIGHT: 140.44 LBS | OXYGEN SATURATION: 98 % | SYSTOLIC BLOOD PRESSURE: 157 MMHG | HEART RATE: 73 BPM | DIASTOLIC BLOOD PRESSURE: 76 MMHG | TEMPERATURE: 97.3 F

## 2019-01-16 DIAGNOSIS — M16.12 ARTHRITIS OF LEFT HIP: ICD-10-CM

## 2019-01-16 LAB
ALBUMIN SERPL-MCNC: 3.7 G/DL (ref 3.5–5.2)
ALBUMIN/GLOB SERPL: 1 G/DL
ALP SERPL-CCNC: 186 U/L (ref 39–117)
ALT SERPL W P-5'-P-CCNC: 25 U/L (ref 1–33)
ANION GAP SERPL CALCULATED.3IONS-SCNC: 13.8 MMOL/L
AST SERPL-CCNC: 50 U/L (ref 1–32)
BACTERIA UR QL AUTO: ABNORMAL /HPF
BILIRUB SERPL-MCNC: 1.1 MG/DL (ref 0.1–1.2)
BILIRUB UR QL STRIP: NEGATIVE
BUN BLD-MCNC: 14 MG/DL (ref 8–23)
BUN/CREAT SERPL: 14.9 (ref 7–25)
CALCIUM SPEC-SCNC: 10.2 MG/DL (ref 8.6–10.5)
CHLORIDE SERPL-SCNC: 101 MMOL/L (ref 98–107)
CLARITY UR: CLEAR
CO2 SERPL-SCNC: 26.2 MMOL/L (ref 22–29)
COD CRY URNS QL: ABNORMAL /HPF
COLOR UR: ABNORMAL
CREAT BLD-MCNC: 0.94 MG/DL (ref 0.57–1)
DEPRECATED RDW RBC AUTO: 50.3 FL (ref 37–54)
ERYTHROCYTE [DISTWIDTH] IN BLOOD BY AUTOMATED COUNT: 15 % (ref 11.7–13)
GFR SERPL CREATININE-BSD FRML MDRD: 57 ML/MIN/1.73
GLOBULIN UR ELPH-MCNC: 3.8 GM/DL
GLUCOSE BLD-MCNC: 138 MG/DL (ref 65–99)
GLUCOSE UR STRIP-MCNC: NEGATIVE MG/DL
HCT VFR BLD AUTO: 40.7 % (ref 35.6–45.5)
HGB BLD-MCNC: 13.6 G/DL (ref 11.9–15.5)
HGB UR QL STRIP.AUTO: NEGATIVE
HYALINE CASTS UR QL AUTO: ABNORMAL /LPF
KETONES UR QL STRIP: NEGATIVE
LEUKOCYTE ESTERASE UR QL STRIP.AUTO: ABNORMAL
MCH RBC QN AUTO: 30.5 PG (ref 26.9–32)
MCHC RBC AUTO-ENTMCNC: 33.4 G/DL (ref 32.4–36.3)
MCV RBC AUTO: 91.3 FL (ref 80.5–98.2)
NITRITE UR QL STRIP: NEGATIVE
PH UR STRIP.AUTO: 5.5 [PH] (ref 5–8)
PLATELET # BLD AUTO: 118 10*3/MM3 (ref 140–500)
PMV BLD AUTO: 11.3 FL (ref 6–12)
POTASSIUM BLD-SCNC: 3.9 MMOL/L (ref 3.5–5.2)
PROT SERPL-MCNC: 7.5 G/DL (ref 6–8.5)
PROT UR QL STRIP: NEGATIVE
RBC # BLD AUTO: 4.46 10*6/MM3 (ref 3.9–5.2)
RBC # UR: ABNORMAL /HPF
REF LAB TEST METHOD: ABNORMAL
SODIUM BLD-SCNC: 141 MMOL/L (ref 136–145)
SP GR UR STRIP: 1.02 (ref 1–1.03)
SQUAMOUS #/AREA URNS HPF: ABNORMAL /HPF
UROBILINOGEN UR QL STRIP: ABNORMAL
WBC NRBC COR # BLD: 4.34 10*3/MM3 (ref 4.5–10.7)
WBC UR QL AUTO: ABNORMAL /HPF

## 2019-01-16 PROCEDURE — 36415 COLL VENOUS BLD VENIPUNCTURE: CPT

## 2019-01-16 PROCEDURE — 93005 ELECTROCARDIOGRAM TRACING: CPT

## 2019-01-16 PROCEDURE — 93010 ELECTROCARDIOGRAM REPORT: CPT | Performed by: INTERNAL MEDICINE

## 2019-01-16 PROCEDURE — 80053 COMPREHEN METABOLIC PANEL: CPT | Performed by: ORTHOPAEDIC SURGERY

## 2019-01-16 PROCEDURE — 85027 COMPLETE CBC AUTOMATED: CPT | Performed by: ORTHOPAEDIC SURGERY

## 2019-01-16 PROCEDURE — 81001 URINALYSIS AUTO W/SCOPE: CPT | Performed by: ORTHOPAEDIC SURGERY

## 2019-01-16 RX ORDER — ATORVASTATIN CALCIUM 80 MG/1
80 TABLET, FILM COATED ORAL DAILY
COMMUNITY
End: 2020-07-07

## 2019-01-16 RX ORDER — PANTOPRAZOLE SODIUM 40 MG/1
40 TABLET, DELAYED RELEASE ORAL DAILY
COMMUNITY
End: 2019-12-23

## 2019-01-16 RX ORDER — FERROUS SULFATE 325(65) MG
325 TABLET ORAL
COMMUNITY
End: 2019-11-07

## 2019-01-16 RX ORDER — CARVEDILOL 3.12 MG/1
3.12 TABLET ORAL 2 TIMES DAILY WITH MEALS
COMMUNITY
End: 2019-04-18 | Stop reason: SDUPTHER

## 2019-01-16 ASSESSMENT — HOOS JR
HOOS JR SCORE: 0
HOOS JR SCORE: 24

## 2019-01-16 NOTE — DISCHARGE INSTRUCTIONS
Take the following medications the morning of surgery with a small sip of water:    PANTOPRAZOLE  AMLODIPINE  CARVEDILOL  HYDROCODONE IF NEEDED    General Instructions:  • Do not eat solid food after midnight the night before surgery.  • You may drink clear liquids day of surgery but must stop at least one hour before your hospital arrival time 0600 AM STOP DRINKING!!!  • It is beneficial for you to have a clear drink that contains carbohydrates the day of surgery.  We suggest a 12 to 20 ounce bottle of Gatorade or Powerade for non-diabetic patients or a 12 to 20 ounce bottle of G2 or Powerade Zero for diabetic patients.     Clear liquids are liquids you can see through.  Nothing red in color.     Plain water                               Sports drinks  Sodas                                   Gelatin (Jell-O)  Fruit juices without pulp such as white grape juice and apple juice  Popsicles that contain no fruit or yogurt  Tea or coffee (no cream or milk added)  Gatorade / Powerade  G2 / Powerade Zero    • Patients who avoid smoking, chewing tobacco and alcohol for 4 weeks prior to surgery have a reduced risk of post-operative complications.  Quit smoking as many days before surgery as you can.  • Do not smoke, use chewing tobacco or drink alcohol the day of surgery.   • If applicable bring your C-PAP/ BI-PAP machine.  • Bring any papers given to you in the doctor’s office.  • Wear clean comfortable clothes and socks.  • Do not wear contact lenses or make-up.  Bring a case for your glasses.   • Bring crutches or walker if applicable.  • Remove all piercings.  Leave jewelry and any other valuables at home.  • Hair extensions with metal clips must be removed prior to surgery.  • The Pre-Admission Testing nurse will instruct you to bring medications if unable to obtain an accurate list in Pre-Admission Testing.        Preventing a Surgical Site Infection:  • For 2 to 3 days before surgery, avoid shaving with a razor  because the razor can irritate skin and make it easier to develop an infection.    • Any areas of open skin can increase the risk of a post-operative wound infection by allowing bacteria to enter and travel throughout the body.  Notify your surgeon if you have any skin wounds / rashes even if it is not near the expected surgical site.  The area will need assessed to determine if surgery should be delayed until it is healed.  • The night prior to surgery sleep in a clean bed with clean clothing.  Do not allow pets to sleep with you.  • Shower on the morning of surgery using a fresh bar of anti-bacterial soap (such as Dial) and clean washcloth.  Dry with a clean towel and dress in clean clothing.  • Ask your surgeon if you will be receiving antibiotics prior to surgery.  • Make sure you, your family, and all healthcare providers clean their hands with soap and water or an alcohol based hand  before caring for you or your wound.    Day of surgery:01- ARRIVE @ 0700 AM REPORT TO THE MAIN OR   Upon arrival, a Pre-op nurse and Anesthesiologist will review your health history, obtain vital signs, and answer questions you may have.  The only belongings needed at this time will be your home medications and if applicable your C-PAP/BI-PAP machine.  If you are staying overnight your family can leave the rest of your belongings in the car and bring them to your room later.  A Pre-op nurse will start an IV and you may receive medication in preparation for surgery, including something to help you relax.  Your family will be able to see you in the Pre-op area.  While you are in surgery your family should notify the waiting room  if they leave the waiting room area and provide a contact phone number.    Please be aware that surgery does come with discomfort.  We want to make every effort to control your discomfort so please discuss any uncontrolled symptoms with your nurse.   Your doctor will most likely  have prescribed pain medications.      If you are going home after surgery you will receive individualized written care instructions before being discharged.  A responsible adult must drive you to and from the hospital on the day of your surgery and stay with you for 24 hours.    If you are staying overnight following surgery, you will be transported to your hospital room following the recovery period.  [facility] has all private rooms.    You have received a list of surgical assistants for your reference.  If you have any questions please call Pre-Admission Testing at [phone].  Deductibles and co-payments are collected on the day of service. Please be prepared to pay the required co-pay, deductible or deposit on the day of service as defined by your plan.      2% CHLORAHEXIDINE GLUCONATE* CLOTH  Preparing or “prepping” skin before surgery can reduce the risk of infection at the surgical site. To make the process easier, [facility] has chosen disposable cloths moistened with a rinse-free, 2% Chlorhexidine Gluconate (CHG) antiseptic solution. The steps below outline the prepping process and should be carefully followed.        Use the prep cloth on the area that is circled in the diagram             Directions Night before Surgery 01- PM PRIOR TO SURGERY (LEFT HIP AREA)  1) Shower using a fresh bar of anti-bacterial soap (such as Dial) and clean washcloth.  Use a clean towel to completely dry your skin.  2) Do not use any lotions, oils or creams on your skin.  3) Open the package and remove 1 cloth, wipe your skin for 30 seconds in a circular motion.  Allow to dry for 3 minutes.  4) Repeat #3 with second cloth.  5) Do not touch your eyes, ears, or mouth with the prep cloth.  6) Allow the wet prep solution to air dry.  7) Discard the prep cloth and wash your hands with soap and water.   8) Dress in clean bed clothes and sleep on fresh clean bed sheets.   9) You may experience some  temporary itching after the prep.    Directions Day of Surgery 01- AM PRIOR TO SURGERY (LEFT HIP AREA)  1) Repeat steps 1,2,3,4,5,6,7, and 9.   2) Dress in clean clothes before coming to the hospital.    BACTROBAN NASAL OINTMENT  There are many germs normally in your nose. Bactroban is an ointment that will help reduce these germs. Please follow these instructions for Bactroban use:      ____The day before surgery in the morning  [id number]_______    ____The day before surgery in the evening              [id number]_______    ____The day of surgery in the morning    [id number]_______    **Squirt ½ package of Bactroban Ointment onto a cotton applicator and apply to inside of 1st nostril.  Squirt the remaining Bactroban and apply to the inside of the other nostril.

## 2019-01-18 RX ORDER — AMLODIPINE BESYLATE 2.5 MG/1
2.5 TABLET ORAL DAILY
Qty: 90 TABLET | Refills: 0 | Status: SHIPPED | OUTPATIENT
Start: 2019-01-18 | End: 2019-01-28 | Stop reason: HOSPADM

## 2019-01-18 RX ORDER — CARVEDILOL 3.12 MG/1
TABLET ORAL
Qty: 180 TABLET | Refills: 0 | Status: ON HOLD | OUTPATIENT
Start: 2019-01-18 | End: 2019-01-23

## 2019-01-21 ENCOUNTER — OFFICE VISIT (OUTPATIENT)
Dept: ORTHOPEDIC SURGERY | Facility: CLINIC | Age: 84
End: 2019-01-21

## 2019-01-21 VITALS — TEMPERATURE: 97.4 F | HEIGHT: 63 IN | WEIGHT: 140 LBS | BODY MASS INDEX: 24.8 KG/M2

## 2019-01-21 DIAGNOSIS — M16.12 ARTHRITIS OF LEFT HIP: Primary | ICD-10-CM

## 2019-01-21 PROCEDURE — S0260 H&P FOR SURGERY: HCPCS | Performed by: NURSE PRACTITIONER

## 2019-01-21 PROCEDURE — 73502 X-RAY EXAM HIP UNI 2-3 VIEWS: CPT | Performed by: NURSE PRACTITIONER

## 2019-01-21 NOTE — PROGRESS NOTES
"   History & Physical       Patient: Pamela Durham  YOB: 1935  Medical Record Number: [id number]  Wt Readings from Last 3 Encounters:   01/21/19 63.5 kg (140 lb)   01/16/19 63.7 kg (140 lb 7 oz)   12/27/18 67.1 kg (148 lb)     Ht Readings from Last 3 Encounters:   01/21/19 160 cm (63\")   01/16/19 160 cm (63\")   12/27/18 160.9 cm (63.35\")     Body mass index is 24.8 kg/m².  Facility age limit for growth percentiles is 20 years.    Surgeon:  Dr. Domenico Peñaloza    Chief Complaints:   Chief Complaint   Patient presents with   • Left Hip - Pre-op Exam, Pain       Subjective:    History of Present Illness: 83 y.o. female presents with   Chief Complaint   Patient presents with   • Left Hip - Pre-op Exam, Pain   . Onset of symptoms was years ago and has been progressively worsening despite more conservative treatment measures.  Symptoms are associated with ability to move, exercise, and perform activities of daily living.  Symptoms are aggravated by weight bearing and ROM necessary for activities of daily living.   Symptoms improve with rest, ice and elevation only minimally.      Allergies: No Known Allergies    Medications:   Home Medications:  Current Outpatient Medications on File Prior to Visit   Medication Sig   • amLODIPine (NORVASC) 2.5 MG tablet TAKE 1 TABLET BY MOUTH DAILY   • atorvastatin (LIPITOR) 80 MG tablet Take 80 mg by mouth Daily.   • BIOTIN PO Take 1 tablet by mouth Daily. HOLD PRIOR TO SURGERY   • CALCIUM PO Take 2 tablets by mouth Daily. HOLD PRIOR TO SURGERY   • carvedilol (COREG) 3.125 MG tablet Take 3.125 mg by mouth Daily.   • carvedilol (COREG) 3.125 MG tablet TAKE 1 TABLET BY MOUTH EVERY 12 HOURS   • CHLORHEXIDINE GLUCONATE CLOTH EX Apply  topically. AS DIRECTED:  PM DAY BEFORE SURGERY  AM DAY OF SURGERY   • Cholecalciferol (VITAMIN D-3 PO) Take 1 tablet by mouth Daily. HOLD PRIOR TO SURGERY   • docusate sodium (COLACE) 100 MG capsule Take 100 mg by mouth 2 (Two) Times a Day.   • " ferrous sulfate 325 (65 FE) MG tablet Take 325 mg by mouth Daily With Breakfast. HOLD PRIOR TO SURGERY   • HYDROcodone-acetaminophen (NORCO) 7.5-325 MG per tablet Take 1 tablet by mouth Every 6 (Six) Hours As Needed for Moderate Pain .   • Multiple Vitamins-Minerals (CENTRUM SILVER) tablet Take 1 tablet by mouth Daily. HOLD PRIOR TO SURGERY   • mupirocin (BACTROBAN) 2 % nasal ointment into the nostril(s) as directed by provider. AS DIRECTED:  AM & PM DAY BEFORE SURGERY  AM DAY OF SURGERY   • pantoprazole (PROTONIX) 40 MG EC tablet Take 40 mg by mouth Daily.     No current facility-administered medications on file prior to visit.      Current Medications:  Scheduled Meds:  Continuous Infusions:  No current facility-administered medications for this visit.   PRN Meds:.    I have reviewed the patient's medical history in detail and updated the computerized patient record.  Review and summarization of old records include:    Past Medical History:   Diagnosis Date   • Anemia    • Cirrhosis, non-alcoholic (CMS/HCC)    • Gallbladder stone without cholecystitis or obstruction     gallstone seen on ultrasound 2007   • GERD (gastroesophageal reflux disease)    • Health care maintenance    • Hyperlipidemia    • Hypertension    • Lumbar canal stenosis    • Lumbar radiculopathy    • MI (myocardial infarction) (CMS/HCC) 12/25/2016    NON-STEMI   • NSTEMI (non-ST elevated myocardial infarction) (CMS/[facility])    • Osteoarthritis    • Thrombophlebitis of superficial veins of upper extremities     LEFT        Past Surgical History:   Procedure Laterality Date   • CARDIAC CATHETERIZATION N/A 3/1/2017    Procedure: Coronary angiography;  Surgeon: Desean Earl MD;  Location: [facility] CATH INVASIVE LOCATION;  Service:    • COLONOSCOPY N/A 12/27/2016    Procedure: COLONOSCOPY INTO CECUM WITH ARGON PLASMA COAGULATION;  Surgeon: Javier Peñaloza MD;  Location: [facility] ENDOSCOPY;  Service:    • ENDOSCOPY N/A 12/27/2016    Procedure:  ESOPHAGOGASTRODUODENOSCOPY WITH COLD BIOPSIES;  Surgeon: Javier Peñaloza MD;  Location: [facility] ENDOSCOPY;  Service:    • HIP ARTHROPLASTY Right 04/01/2015   • HYSTERECTOMY  1986   • KNEE SURGERY Bilateral     2007 & 2008   • LUNG SURGERY  1965   • TONSILLECTOMY          Social History     Occupational History   • Occupation: Secretarial     Employer: RETIRED   Tobacco Use   • Smoking status: Never Smoker   • Smokeless tobacco: Never Used   Substance and Sexual Activity   • Alcohol use: No   • Drug use: No   • Sexual activity: Defer    Social History     Social History Narrative   • Not on file        Family History   Problem Relation Age of Onset   • Hypertension Other    • Heart disease Other    • Liver cancer Brother    • Ovarian cancer Daughter         diagnosed 2012   • No Known Problems Mother    • Heart disease Father    • Heart attack Father    • Hypertension Father    • Cancer Maternal Grandmother    • No Known Problems Maternal Grandfather    • No Known Problems Paternal Grandmother    • No Known Problems Paternal Grandfather    • Malig Hyperthermia Neg Hx        ROS: 14 point review of systems was performed and was negative except for documented findings in HPI and today's encounter.     Allergies: No Known Allergies  Constitutional:  Denies fever, shaking or chills   Eyes:  Denies change in visual acuity   HENT:  Denies nasal congestion or sore throat   Respiratory:  Denies cough or shortness of breath   Cardiovascular:  Denies chest pain or severe LE edema   GI:  Denies abdominal pain, nausea, vomiting, bloody stools or diarrhea   Musculoskeletal:  Denies numbness tingling or loss of motor function except as outlined above in history of present illness.  : Denies painful urination or hematuria  Integument:  Denies rash, lesion or ulceration   Neurologic:  Denies headache or focal weakness  Endocrine:  Denies lymphadenopathy  Psych:  Denies confusion or change in mental status   Hem:  Denies active  "bleeding    Physical Exam: 83 y.o. female  Wt Readings from Last 3 Encounters:   01/21/19 63.5 kg (140 lb)   01/16/19 63.7 kg (140 lb 7 oz)   12/27/18 67.1 kg (148 lb)     Ht Readings from Last 3 Encounters:   01/21/19 160 cm (63\")   01/16/19 160 cm (63\")   12/27/18 160.9 cm (63.35\")     Body mass index is 24.8 kg/m².  Facility age limit for growth percentiles is 20 years.  Vitals:    01/21/19 1025   Temp: 97.4 °F (36.3 °C)       Vital signs reviewed.   General Appearance:    Alert, cooperative, in no acute distress                  Eyes: conjunctiva clear  ENT: external ears and nose atraumatic  CV: no peripheral edema  Resp: normal respiratory effort  Skin: no rashes or wounds; normal turgor  Psych: mood and affect appropriate  Lymph: no nodes appreciated  Neuro: gross sensation intact  Vascular:  Palpable peripheral pulse in noted extremity  Musculoskeletal Extremities: HIP Exam: antalgic and stiff-legged gait with assistive device left hip, Stinchfield postitive, stiffness, MICHELLE test positive and guteal pain 2+ pedal pulses and brisk capillary refill Pedal edema none          Diagnostic Tests:  Appointment on 01/16/2019   Component Date Value Ref Range Status   • Color, UA 01/16/2019 Dark Yellow* Yellow, Straw Final   • Appearance, UA 01/16/2019 Clear  Clear Final   • pH, UA 01/16/2019 5.5  5.0 - 8.0 Final   • Specific Gravity, UA 01/16/2019 1.024  1.005 - 1.030 Final   • Glucose, UA 01/16/2019 Negative  Negative Final   • Ketones, UA 01/16/2019 Negative  Negative Final   • Bilirubin, UA 01/16/2019 Negative  Negative Final   • Blood, UA 01/16/2019 Negative  Negative Final   • Protein, UA 01/16/2019 Negative  Negative Final   • Leuk Esterase, UA 01/16/2019 Trace* Negative Final   • Nitrite, UA 01/16/2019 Negative  Negative Final   • Urobilinogen, UA 01/16/2019 1.0 E.U./dL  0.2 - 1.0 E.U./dL Final   • WBC 01/16/2019 4.34* 4.50 - 10.70 10*3/mm3 Final   • RBC 01/16/2019 4.46  3.90 - 5.20 10*6/mm3 Final   • " Hemoglobin 01/16/2019 13.6  11.9 - 15.5 g/dL Final   • Hematocrit 01/16/2019 40.7  35.6 - 45.5 % Final   • MCV 01/16/2019 91.3  80.5 - 98.2 fL Final   • MCH 01/16/2019 30.5  26.9 - 32.0 pg Final   • MCHC 01/16/2019 33.4  32.4 - 36.3 g/dL Final   • RDW 01/16/2019 15.0* 11.7 - 13.0 % Final   • RDW-SD 01/16/2019 50.3  37.0 - 54.0 fl Final   • MPV 01/16/2019 11.3  6.0 - 12.0 fL Final   • Platelets 01/16/2019 118* 140 - 500 10*3/mm3 Final   • Glucose 01/16/2019 138* 65 - 99 mg/dL Final   • BUN 01/16/2019 14  8 - 23 mg/dL Final   • Creatinine 01/16/2019 0.94  0.57 - 1.00 mg/dL Final   • Sodium 01/16/2019 141  136 - 145 mmol/L Final   • Potassium 01/16/2019 3.9  3.5 - 5.2 mmol/L Final   • Chloride 01/16/2019 101  98 - 107 mmol/L Final   • CO2 01/16/2019 26.2  22.0 - 29.0 mmol/L Final   • Calcium 01/16/2019 10.2  8.6 - 10.5 mg/dL Final   • Total Protein 01/16/2019 7.5  6.0 - 8.5 g/dL Final   • Albumin 01/16/2019 3.70  3.50 - 5.20 g/dL Final   • ALT (SGPT) 01/16/2019 25  1 - 33 U/L Final   • AST (SGOT) 01/16/2019 50* 1 - 32 U/L Final   • Alkaline Phosphatase 01/16/2019 186* 39 - 117 U/L Final   • Total Bilirubin 01/16/2019 1.1  0.1 - 1.2 mg/dL Final   • eGFR Non African Amer 01/16/2019 57* >60 mL/min/1.73 Final   • Globulin 01/16/2019 3.8  gm/dL Final   • A/G Ratio 01/16/2019 1.0  g/dL Final   • BUN/Creatinine Ratio 01/16/2019 14.9  7.0 - 25.0 Final   • Anion Gap 01/16/2019 13.8  mmol/L Final   • RBC, UA 01/16/2019 0-2  None Seen, 0-2 /HPF Final   • WBC, UA 01/16/2019 0-2  None Seen, 0-2 /HPF Final   • Bacteria, UA 01/16/2019 1+* None Seen /HPF Final   • Squamous Epithelial Cells, UA 01/16/2019 3-6* None Seen, 0-2 /HPF Final   • Hyaline Casts, UA 01/16/2019 3-6  None Seen /LPF Final   • Calcium Oxalate Crystals, UA 01/16/2019 Small/1+  None Seen /HPF Final   • Methodology 01/16/2019 Manual Light Microscopy   Final   Appointment on 01/11/2019   Component Date Value Ref Range Status   • WBC 01/11/2019 4.37* 4.50 - 10.70  10*3/mm3 Final   • RBC 01/11/2019 4.62  3.90 - 5.20 10*6/mm3 Final   • Hemoglobin 01/11/2019 13.7  11.9 - 15.5 g/dL Final   • Hematocrit 01/11/2019 40.4  35.6 - 45.5 % Final   • MCV 01/11/2019 87.4  80.5 - 98.2 fL Final   • MCH 01/11/2019 29.7  26.9 - 32.0 pg Final   • MCHC 01/11/2019 33.9  32.4 - 36.3 g/dL Final   • RDW 01/11/2019 14.6* 11.7 - 13.0 % Final   • RDW-SD 01/11/2019 46.9  37.0 - 54.0 fl Final   • MPV 01/11/2019 11.5  6.0 - 12.0 fL Final   • Platelets 01/11/2019 115* 140 - 500 10*3/mm3 Final   • Neutrophil % 01/11/2019 62.9  42.7 - 76.0 % Final   • Lymphocyte % 01/11/2019 22.9  19.6 - 45.3 % Final   • Monocyte % 01/11/2019 10.5  5.0 - 12.0 % Final   • Eosinophil % 01/11/2019 2.5  0.3 - 6.2 % Final   • Basophil % 01/11/2019 0.7  0.0 - 1.5 % Final   • Immature Grans % 01/11/2019 0.5  0.0 - 0.5 % Final   • Neutrophils, Absolute 01/11/2019 2.75  1.90 - 8.10 10*3/mm3 Final   • Lymphocytes, Absolute 01/11/2019 1.00  0.90 - 4.80 10*3/mm3 Final   • Monocytes, Absolute 01/11/2019 0.46  0.20 - 1.20 10*3/mm3 Final   • Eosinophils, Absolute 01/11/2019 0.11  0.00 - 0.70 10*3/mm3 Final   • Basophils, Absolute 01/11/2019 0.03  0.00 - 0.20 10*3/mm3 Final   • Immature Grans, Absolute 01/11/2019 0.02  0.00 - 0.03 10*3/mm3 Final   • nRBC 01/11/2019 0.0  0.0 - 0.0 /100 WBC Final       Imaging was done today, images were personally viewed, viewed images and discussed with the patient:    Indication: pain related symptoms,  Views: 2V AP&LAT left hip(s)   Findings: severe end-stage arthritis (bone on bone, subchondral sclerosis/cysts, osteophytes)  Comparison views: viewed last xray done in the office.     Assessment:  Patient Active Problem List   Diagnosis   • History of total hip arthroplasty   • Lumbar canal stenosis   • Lumbar radiculopathy   • Essential hypertension   • Hyperlipemia   • Elevated fasting glucose   • NSTEMI (non-ST elevated myocardial infarction) (CMS/HCC)   • Iron deficiency anemia   • General weakness    • Lung nodule   • Abnormal CT of the abdomen   • Superficial thrombophlebitis of left upper extremity   • Thrombocytopenia (CMS/HCC)   • Leukopenia   • Coronary artery disease involving native coronary artery of native heart without angina pectoris   • Abnormal CT of the abdomen   • General weakness   • Abnormal CT of the abdomen   • Abnormal CT of the abdomen   • General weakness   • Cirrhosis, non-alcoholic (CMS/HCC)   • Arthritis of left hip       Plan:  Dr. Domenico Peñaloza reviewed anatomy of a total joint arthroplasty in laymen's terms, as well as typical postoperative recovery and possibly 6-12 months for maximal recovery, and possible need for rehabilitation stay after hospitalization. We also discussed risks, benefits, alternatives, and limitations of procedure with risks including but not limited to neurovascular damage, bleeding, infection, malalignment, chronic pian, failure of implants, osteolysis, loosening of implants, loss of motion, weakness, stiffness, instability, DVT, pulmonary embolus, death, stroke, complex regional pain syndrome, myocardial infarction, and need for additional procedures. Concept of substitution vs. replacement discussed.  No guarantees were given regarding results of surgery.      Pamela Durham was given the opportunity to ask and have all questions answered today.  The patient voiced understanding of the risks, benefits, and alternative forms of treatment that were discussed and the patient consents to proceed with surgery.     Patient's blood clot history is positive see note:. Has hx of mild heart attack 2 years ago from anemia.   Planned DVT prophylaxis for surgery:  Aspirin    Discharge Plan: POD 2-3 to rehab, plans rehab went to [facility] in 2015 but may want to go somewhere else this time,  Clearance by Dr. Lott for hip surgery. (CBC group for Leukopenia and Thrombocytopenia secondary to cirrhosis of the liver(GAFFNEY) with splenomegaly).  Dr. Earl gave cardiac  clearance as mod risk.     Gluc 138 GFR57  Hgb 13.6  Plt 118  Last A1c 6.0 elevated liver enzymes  avoid too much tylenol!  Has Norco 7.5 for back pain only taking 1 a week when she goes to [facility]. Oxycodone years ago made her loopy. Will plan norco 7.5 and see how this does for her as she will not be on it very long.     Date: 1/21/2019  Jeni Edwards, APRN

## 2019-01-23 ENCOUNTER — APPOINTMENT (OUTPATIENT)
Dept: GENERAL RADIOLOGY | Facility: HOSPITAL | Age: 84
End: 2019-01-23

## 2019-01-23 ENCOUNTER — ANESTHESIA (OUTPATIENT)
Dept: PERIOP | Facility: HOSPITAL | Age: 84
End: 2019-01-23

## 2019-01-23 ENCOUNTER — ANESTHESIA EVENT (OUTPATIENT)
Dept: PERIOP | Facility: HOSPITAL | Age: 84
End: 2019-01-23

## 2019-01-23 ENCOUNTER — HOSPITAL ENCOUNTER (INPATIENT)
Facility: HOSPITAL | Age: 84
LOS: 5 days | Discharge: NURSING, REHAB OR LONG-TERM CARE | End: 2019-01-28
Attending: ORTHOPAEDIC SURGERY | Admitting: ORTHOPAEDIC SURGERY

## 2019-01-23 DIAGNOSIS — R26.89 DECREASED MOBILITY: Primary | ICD-10-CM

## 2019-01-23 DIAGNOSIS — M16.12 ARTHRITIS OF LEFT HIP: ICD-10-CM

## 2019-01-23 PROBLEM — I73.9 PVD (PERIPHERAL VASCULAR DISEASE) (HCC): Chronic | Status: ACTIVE | Noted: 2019-01-23

## 2019-01-23 PROCEDURE — 20985 CPTR-ASST DIR MS PX: CPT | Performed by: ORTHOPAEDIC SURGERY

## 2019-01-23 PROCEDURE — 25010000002 EPINEPHRINE PER 0.1 MG: Performed by: ORTHOPAEDIC SURGERY

## 2019-01-23 PROCEDURE — 0SRB03A REPLACEMENT OF LEFT HIP JOINT WITH CERAMIC SYNTHETIC SUBSTITUTE, UNCEMENTED, OPEN APPROACH: ICD-10-PCS | Performed by: ORTHOPAEDIC SURGERY

## 2019-01-23 PROCEDURE — 25010000003 CEFAZOLIN IN DEXTROSE 2-4 GM/100ML-% SOLUTION: Performed by: ORTHOPAEDIC SURGERY

## 2019-01-23 PROCEDURE — 25010000002 MIDAZOLAM PER 1 MG: Performed by: ANESTHESIOLOGY

## 2019-01-23 PROCEDURE — 25010000002 KETOROLAC TROMETHAMINE PER 15 MG: Performed by: ORTHOPAEDIC SURGERY

## 2019-01-23 PROCEDURE — C1776 JOINT DEVICE (IMPLANTABLE): HCPCS | Performed by: ORTHOPAEDIC SURGERY

## 2019-01-23 PROCEDURE — 73501 X-RAY EXAM HIP UNI 1 VIEW: CPT

## 2019-01-23 PROCEDURE — 0QU70KZ SUPPLEMENT LEFT UPPER FEMUR WITH NONAUTOLOGOUS TISSUE SUBSTITUTE, OPEN APPROACH: ICD-10-PCS | Performed by: ORTHOPAEDIC SURGERY

## 2019-01-23 PROCEDURE — 97110 THERAPEUTIC EXERCISES: CPT

## 2019-01-23 PROCEDURE — 25010000003 CEFAZOLIN IN DEXTROSE 2-4 GM/100ML-% SOLUTION: Performed by: NURSE PRACTITIONER

## 2019-01-23 PROCEDURE — 25010000002 ROPIVACAINE PER 1 MG: Performed by: ORTHOPAEDIC SURGERY

## 2019-01-23 PROCEDURE — 25010000002 PROPOFOL 10 MG/ML EMULSION: Performed by: NURSE ANESTHETIST, CERTIFIED REGISTERED

## 2019-01-23 PROCEDURE — 25010000002 FENTANYL CITRATE (PF) 100 MCG/2ML SOLUTION: Performed by: NURSE ANESTHETIST, CERTIFIED REGISTERED

## 2019-01-23 PROCEDURE — 0QH704Z INSERTION OF INTERNAL FIXATION DEVICE INTO LEFT UPPER FEMUR, OPEN APPROACH: ICD-10-PCS | Performed by: ORTHOPAEDIC SURGERY

## 2019-01-23 PROCEDURE — 97162 PT EVAL MOD COMPLEX 30 MIN: CPT

## 2019-01-23 PROCEDURE — 27130 TOTAL HIP ARTHROPLASTY: CPT | Performed by: ORTHOPAEDIC SURGERY

## 2019-01-23 PROCEDURE — 27130 TOTAL HIP ARTHROPLASTY: CPT | Performed by: NURSE PRACTITIONER

## 2019-01-23 PROCEDURE — 25010000002 PHENYLEPHRINE PER 1 ML: Performed by: NURSE ANESTHETIST, CERTIFIED REGISTERED

## 2019-01-23 PROCEDURE — C1713 ANCHOR/SCREW BN/BN,TIS/BN: HCPCS | Performed by: ORTHOPAEDIC SURGERY

## 2019-01-23 PROCEDURE — 25010000002 DEXAMETHASONE PER 1 MG: Performed by: NURSE ANESTHETIST, CERTIFIED REGISTERED

## 2019-01-23 PROCEDURE — 25010000002 ONDANSETRON PER 1 MG: Performed by: NURSE ANESTHETIST, CERTIFIED REGISTERED

## 2019-01-23 DEVICE — TOTL HIP GRIPTION CUP DEPUY UPCHRG: Type: IMPLANTABLE DEVICE | Site: HIP | Status: FUNCTIONAL

## 2019-01-23 DEVICE — PINNACLE CANCELLOUS BONE SCREW 6.5MM X 25MM
Type: IMPLANTABLE DEVICE | Site: HIP | Status: FUNCTIONAL
Brand: PINNACLE

## 2019-01-23 DEVICE — BIOLOX DELTA CERAMIC FEMORAL HEAD 32MM DIA +5.0 12/14 TAPER
Type: IMPLANTABLE DEVICE | Site: HIP | Status: FUNCTIONAL
Brand: BIOLOX DELTA

## 2019-01-23 DEVICE — CABL COCR 1.7MM WTI CR750MM STRL: Type: IMPLANTABLE DEVICE | Site: HIP | Status: FUNCTIONAL

## 2019-01-23 DEVICE — PINNACLE GRIPTION ACETABULAR SHELL SECTOR 48MM OD
Type: IMPLANTABLE DEVICE | Site: HIP | Status: FUNCTIONAL
Brand: PINNACLE GRIPTION

## 2019-01-23 DEVICE — PINNACLE HIP SOLUTIONS ALTRX POLYETHYLENE ACETABULAR LINER +4 10 DEGREES 32MM ID 48MM OD
Type: IMPLANTABLE DEVICE | Site: HIP | Status: FUNCTIONAL
Brand: PINNACLE ALTRX

## 2019-01-23 DEVICE — TOTL HIP COA DEPUY 9641334: Type: IMPLANTABLE DEVICE | Site: HIP | Status: FUNCTIONAL

## 2019-01-23 DEVICE — SUMMIT FEMORAL STEM 12/14 TAPER TAPERED W/DUOFIX HA SIZE 6 HI 150MM
Type: IMPLANTABLE DEVICE | Site: HIP | Status: FUNCTIONAL
Brand: SUMMIT

## 2019-01-23 RX ORDER — PROMETHAZINE HYDROCHLORIDE 25 MG/1
25 SUPPOSITORY RECTAL ONCE AS NEEDED
Status: DISCONTINUED | OUTPATIENT
Start: 2019-01-23 | End: 2019-01-23 | Stop reason: HOSPADM

## 2019-01-23 RX ORDER — DIPHENHYDRAMINE HCL 25 MG
25 CAPSULE ORAL EVERY 6 HOURS PRN
Status: DISCONTINUED | OUTPATIENT
Start: 2019-01-23 | End: 2019-01-28 | Stop reason: HOSPADM

## 2019-01-23 RX ORDER — SODIUM CHLORIDE 0.9 % (FLUSH) 0.9 %
1-10 SYRINGE (ML) INJECTION AS NEEDED
Status: DISCONTINUED | OUTPATIENT
Start: 2019-01-23 | End: 2019-01-23 | Stop reason: HOSPADM

## 2019-01-23 RX ORDER — HYDROMORPHONE HYDROCHLORIDE 1 MG/ML
0.5 INJECTION, SOLUTION INTRAMUSCULAR; INTRAVENOUS; SUBCUTANEOUS
Status: DISCONTINUED | OUTPATIENT
Start: 2019-01-23 | End: 2019-01-24

## 2019-01-23 RX ORDER — EPHEDRINE SULFATE 50 MG/ML
5 INJECTION, SOLUTION INTRAVENOUS ONCE AS NEEDED
Status: DISCONTINUED | OUTPATIENT
Start: 2019-01-23 | End: 2019-01-23 | Stop reason: HOSPADM

## 2019-01-23 RX ORDER — BISACODYL 5 MG/1
10 TABLET, DELAYED RELEASE ORAL DAILY PRN
Status: DISCONTINUED | OUTPATIENT
Start: 2019-01-23 | End: 2019-01-28 | Stop reason: HOSPADM

## 2019-01-23 RX ORDER — NALOXONE HCL 0.4 MG/ML
0.2 VIAL (ML) INJECTION AS NEEDED
Status: DISCONTINUED | OUTPATIENT
Start: 2019-01-23 | End: 2019-01-23 | Stop reason: HOSPADM

## 2019-01-23 RX ORDER — SODIUM CHLORIDE, SODIUM LACTATE, POTASSIUM CHLORIDE, CALCIUM CHLORIDE 600; 310; 30; 20 MG/100ML; MG/100ML; MG/100ML; MG/100ML
100 INJECTION, SOLUTION INTRAVENOUS CONTINUOUS
Status: ACTIVE | OUTPATIENT
Start: 2019-01-23 | End: 2019-01-24

## 2019-01-23 RX ORDER — MIDAZOLAM HYDROCHLORIDE 1 MG/ML
0.5 INJECTION INTRAMUSCULAR; INTRAVENOUS
Status: COMPLETED | OUTPATIENT
Start: 2019-01-23 | End: 2019-01-23

## 2019-01-23 RX ORDER — FERROUS SULFATE 325(65) MG
325 TABLET ORAL
Status: DISCONTINUED | OUTPATIENT
Start: 2019-01-24 | End: 2019-01-28 | Stop reason: HOSPADM

## 2019-01-23 RX ORDER — TRAMADOL HYDROCHLORIDE 50 MG/1
50 TABLET ORAL EVERY 4 HOURS PRN
Status: DISCONTINUED | OUTPATIENT
Start: 2019-01-23 | End: 2019-01-24

## 2019-01-23 RX ORDER — FAMOTIDINE 40 MG/1
40 TABLET, FILM COATED ORAL DAILY
Status: DISCONTINUED | OUTPATIENT
Start: 2019-01-23 | End: 2019-01-25

## 2019-01-23 RX ORDER — LABETALOL HYDROCHLORIDE 5 MG/ML
5 INJECTION, SOLUTION INTRAVENOUS
Status: DISCONTINUED | OUTPATIENT
Start: 2019-01-23 | End: 2019-01-23 | Stop reason: HOSPADM

## 2019-01-23 RX ORDER — LIDOCAINE HYDROCHLORIDE 20 MG/ML
INJECTION, SOLUTION INFILTRATION; PERINEURAL AS NEEDED
Status: DISCONTINUED | OUTPATIENT
Start: 2019-01-23 | End: 2019-01-23 | Stop reason: SURG

## 2019-01-23 RX ORDER — AMLODIPINE BESYLATE 2.5 MG/1
2.5 TABLET ORAL DAILY
COMMUNITY
End: 2019-05-01

## 2019-01-23 RX ORDER — FLUMAZENIL 0.1 MG/ML
0.2 INJECTION INTRAVENOUS AS NEEDED
Status: DISCONTINUED | OUTPATIENT
Start: 2019-01-23 | End: 2019-01-23 | Stop reason: HOSPADM

## 2019-01-23 RX ORDER — CEFAZOLIN SODIUM 2 G/100ML
2 INJECTION, SOLUTION INTRAVENOUS ONCE
Status: COMPLETED | OUTPATIENT
Start: 2019-01-23 | End: 2019-01-23

## 2019-01-23 RX ORDER — OXYCODONE HYDROCHLORIDE 5 MG/1
10 TABLET ORAL EVERY 4 HOURS PRN
Status: DISCONTINUED | OUTPATIENT
Start: 2019-01-23 | End: 2019-01-23

## 2019-01-23 RX ORDER — ATORVASTATIN CALCIUM 80 MG/1
80 TABLET, FILM COATED ORAL DAILY
Status: DISCONTINUED | OUTPATIENT
Start: 2019-01-23 | End: 2019-01-28 | Stop reason: HOSPADM

## 2019-01-23 RX ORDER — PROPOFOL 10 MG/ML
VIAL (ML) INTRAVENOUS AS NEEDED
Status: DISCONTINUED | OUTPATIENT
Start: 2019-01-23 | End: 2019-01-23 | Stop reason: SURG

## 2019-01-23 RX ORDER — OXYCODONE HYDROCHLORIDE 5 MG/1
5 TABLET ORAL EVERY 4 HOURS PRN
Status: DISCONTINUED | OUTPATIENT
Start: 2019-01-23 | End: 2019-01-23

## 2019-01-23 RX ORDER — ONDANSETRON 2 MG/ML
4 INJECTION INTRAMUSCULAR; INTRAVENOUS ONCE AS NEEDED
Status: DISCONTINUED | OUTPATIENT
Start: 2019-01-23 | End: 2019-01-23 | Stop reason: HOSPADM

## 2019-01-23 RX ORDER — FAMOTIDINE 10 MG/ML
20 INJECTION, SOLUTION INTRAVENOUS ONCE
Status: COMPLETED | OUTPATIENT
Start: 2019-01-23 | End: 2019-01-23

## 2019-01-23 RX ORDER — SODIUM CHLORIDE 9 MG/ML
INJECTION, SOLUTION INTRAVENOUS AS NEEDED
Status: DISCONTINUED | OUTPATIENT
Start: 2019-01-23 | End: 2019-01-23 | Stop reason: HOSPADM

## 2019-01-23 RX ORDER — DIPHENHYDRAMINE HYDROCHLORIDE 50 MG/ML
12.5 INJECTION INTRAMUSCULAR; INTRAVENOUS
Status: DISCONTINUED | OUTPATIENT
Start: 2019-01-23 | End: 2019-01-23 | Stop reason: HOSPADM

## 2019-01-23 RX ORDER — ACETAMINOPHEN 325 MG/1
650 TABLET ORAL ONCE AS NEEDED
Status: DISCONTINUED | OUTPATIENT
Start: 2019-01-23 | End: 2019-01-23 | Stop reason: HOSPADM

## 2019-01-23 RX ORDER — OXYCODONE AND ACETAMINOPHEN 7.5; 325 MG/1; MG/1
1 TABLET ORAL ONCE AS NEEDED
Status: DISCONTINUED | OUTPATIENT
Start: 2019-01-23 | End: 2019-01-23 | Stop reason: HOSPADM

## 2019-01-23 RX ORDER — AMLODIPINE BESYLATE 2.5 MG/1
2.5 TABLET ORAL DAILY
Status: DISCONTINUED | OUTPATIENT
Start: 2019-01-23 | End: 2019-01-28 | Stop reason: HOSPADM

## 2019-01-23 RX ORDER — PROMETHAZINE HYDROCHLORIDE 25 MG/ML
12.5 INJECTION, SOLUTION INTRAMUSCULAR; INTRAVENOUS ONCE AS NEEDED
Status: DISCONTINUED | OUTPATIENT
Start: 2019-01-23 | End: 2019-01-23 | Stop reason: HOSPADM

## 2019-01-23 RX ORDER — ASPIRIN 325 MG
325 TABLET, DELAYED RELEASE (ENTERIC COATED) ORAL 2 TIMES DAILY
Status: DISCONTINUED | OUTPATIENT
Start: 2019-01-23 | End: 2019-01-28 | Stop reason: HOSPADM

## 2019-01-23 RX ORDER — ONDANSETRON 4 MG/1
4 TABLET, ORALLY DISINTEGRATING ORAL EVERY 6 HOURS PRN
Status: DISCONTINUED | OUTPATIENT
Start: 2019-01-23 | End: 2019-01-28 | Stop reason: HOSPADM

## 2019-01-23 RX ORDER — TRAMADOL HYDROCHLORIDE 50 MG/1
100 TABLET ORAL EVERY 4 HOURS PRN
Status: DISCONTINUED | OUTPATIENT
Start: 2019-01-23 | End: 2019-01-24

## 2019-01-23 RX ORDER — ROCURONIUM BROMIDE 10 MG/ML
INJECTION, SOLUTION INTRAVENOUS AS NEEDED
Status: DISCONTINUED | OUTPATIENT
Start: 2019-01-23 | End: 2019-01-23 | Stop reason: SURG

## 2019-01-23 RX ORDER — LIDOCAINE HYDROCHLORIDE 10 MG/ML
0.5 INJECTION, SOLUTION EPIDURAL; INFILTRATION; INTRACAUDAL; PERINEURAL ONCE AS NEEDED
Status: DISCONTINUED | OUTPATIENT
Start: 2019-01-23 | End: 2019-01-23 | Stop reason: HOSPADM

## 2019-01-23 RX ORDER — DIPHENHYDRAMINE HCL 25 MG
25 CAPSULE ORAL
Status: DISCONTINUED | OUTPATIENT
Start: 2019-01-23 | End: 2019-01-23 | Stop reason: HOSPADM

## 2019-01-23 RX ORDER — CEFAZOLIN SODIUM 2 G/100ML
2 INJECTION, SOLUTION INTRAVENOUS EVERY 8 HOURS
Status: COMPLETED | OUTPATIENT
Start: 2019-01-23 | End: 2019-01-24

## 2019-01-23 RX ORDER — HYDROMORPHONE HYDROCHLORIDE 1 MG/ML
0.5 INJECTION, SOLUTION INTRAMUSCULAR; INTRAVENOUS; SUBCUTANEOUS
Status: DISCONTINUED | OUTPATIENT
Start: 2019-01-23 | End: 2019-01-23 | Stop reason: HOSPADM

## 2019-01-23 RX ORDER — DEXAMETHASONE SODIUM PHOSPHATE 10 MG/ML
INJECTION INTRAMUSCULAR; INTRAVENOUS AS NEEDED
Status: DISCONTINUED | OUTPATIENT
Start: 2019-01-23 | End: 2019-01-23 | Stop reason: SURG

## 2019-01-23 RX ORDER — ONDANSETRON 4 MG/1
4 TABLET, FILM COATED ORAL EVERY 6 HOURS PRN
Status: DISCONTINUED | OUTPATIENT
Start: 2019-01-23 | End: 2019-01-28 | Stop reason: HOSPADM

## 2019-01-23 RX ORDER — FENTANYL CITRATE 50 UG/ML
50 INJECTION, SOLUTION INTRAMUSCULAR; INTRAVENOUS
Status: DISCONTINUED | OUTPATIENT
Start: 2019-01-23 | End: 2019-01-23 | Stop reason: HOSPADM

## 2019-01-23 RX ORDER — PROMETHAZINE HYDROCHLORIDE 25 MG/1
25 TABLET ORAL ONCE AS NEEDED
Status: DISCONTINUED | OUTPATIENT
Start: 2019-01-23 | End: 2019-01-23 | Stop reason: HOSPADM

## 2019-01-23 RX ORDER — TRANEXAMIC ACID 100 MG/ML
INJECTION, SOLUTION INTRAVENOUS AS NEEDED
Status: DISCONTINUED | OUTPATIENT
Start: 2019-01-23 | End: 2019-01-23 | Stop reason: SURG

## 2019-01-23 RX ORDER — MAGNESIUM HYDROXIDE 1200 MG/15ML
LIQUID ORAL AS NEEDED
Status: DISCONTINUED | OUTPATIENT
Start: 2019-01-23 | End: 2019-01-23 | Stop reason: HOSPADM

## 2019-01-23 RX ORDER — DIPHENHYDRAMINE HYDROCHLORIDE 50 MG/ML
25 INJECTION INTRAMUSCULAR; INTRAVENOUS EVERY 6 HOURS PRN
Status: DISCONTINUED | OUTPATIENT
Start: 2019-01-23 | End: 2019-01-28 | Stop reason: HOSPADM

## 2019-01-23 RX ORDER — SODIUM CHLORIDE, SODIUM LACTATE, POTASSIUM CHLORIDE, CALCIUM CHLORIDE 600; 310; 30; 20 MG/100ML; MG/100ML; MG/100ML; MG/100ML
9 INJECTION, SOLUTION INTRAVENOUS CONTINUOUS
Status: DISCONTINUED | OUTPATIENT
Start: 2019-01-23 | End: 2019-01-28 | Stop reason: HOSPADM

## 2019-01-23 RX ORDER — PROMETHAZINE HYDROCHLORIDE 12.5 MG/1
12.5 TABLET ORAL EVERY 6 HOURS PRN
Status: DISCONTINUED | OUTPATIENT
Start: 2019-01-23 | End: 2019-01-28 | Stop reason: HOSPADM

## 2019-01-23 RX ORDER — CARVEDILOL 3.12 MG/1
3.12 TABLET ORAL 2 TIMES DAILY WITH MEALS
Status: DISCONTINUED | OUTPATIENT
Start: 2019-01-23 | End: 2019-01-28 | Stop reason: HOSPADM

## 2019-01-23 RX ORDER — HYDRALAZINE HYDROCHLORIDE 20 MG/ML
5 INJECTION INTRAMUSCULAR; INTRAVENOUS
Status: DISCONTINUED | OUTPATIENT
Start: 2019-01-23 | End: 2019-01-23 | Stop reason: HOSPADM

## 2019-01-23 RX ORDER — ONDANSETRON 2 MG/ML
4 INJECTION INTRAMUSCULAR; INTRAVENOUS EVERY 6 HOURS PRN
Status: DISCONTINUED | OUTPATIENT
Start: 2019-01-23 | End: 2019-01-28 | Stop reason: HOSPADM

## 2019-01-23 RX ORDER — PANTOPRAZOLE SODIUM 40 MG/1
40 TABLET, DELAYED RELEASE ORAL DAILY
Status: DISCONTINUED | OUTPATIENT
Start: 2019-01-23 | End: 2019-01-28 | Stop reason: HOSPADM

## 2019-01-23 RX ORDER — HYDROCODONE BITARTRATE AND ACETAMINOPHEN 7.5; 325 MG/1; MG/1
1 TABLET ORAL ONCE AS NEEDED
Status: DISCONTINUED | OUTPATIENT
Start: 2019-01-23 | End: 2019-01-23 | Stop reason: HOSPADM

## 2019-01-23 RX ORDER — NALOXONE HCL 0.4 MG/ML
0.1 VIAL (ML) INJECTION
Status: DISCONTINUED | OUTPATIENT
Start: 2019-01-23 | End: 2019-01-28 | Stop reason: HOSPADM

## 2019-01-23 RX ORDER — BISACODYL 10 MG
10 SUPPOSITORY, RECTAL RECTAL DAILY PRN
Status: DISCONTINUED | OUTPATIENT
Start: 2019-01-23 | End: 2019-01-28 | Stop reason: HOSPADM

## 2019-01-23 RX ORDER — FENTANYL CITRATE 50 UG/ML
INJECTION, SOLUTION INTRAMUSCULAR; INTRAVENOUS AS NEEDED
Status: DISCONTINUED | OUTPATIENT
Start: 2019-01-23 | End: 2019-01-23 | Stop reason: SURG

## 2019-01-23 RX ORDER — ONDANSETRON 2 MG/ML
INJECTION INTRAMUSCULAR; INTRAVENOUS AS NEEDED
Status: DISCONTINUED | OUTPATIENT
Start: 2019-01-23 | End: 2019-01-23 | Stop reason: SURG

## 2019-01-23 RX ORDER — EPHEDRINE SULFATE 50 MG/ML
INJECTION, SOLUTION INTRAVENOUS AS NEEDED
Status: DISCONTINUED | OUTPATIENT
Start: 2019-01-23 | End: 2019-01-23 | Stop reason: SURG

## 2019-01-23 RX ORDER — DOCUSATE SODIUM 100 MG/1
100 CAPSULE, LIQUID FILLED ORAL 2 TIMES DAILY
Status: DISCONTINUED | OUTPATIENT
Start: 2019-01-23 | End: 2019-01-28 | Stop reason: HOSPADM

## 2019-01-23 RX ORDER — ACETAMINOPHEN 325 MG/1
325 TABLET ORAL EVERY 4 HOURS PRN
Status: DISCONTINUED | OUTPATIENT
Start: 2019-01-23 | End: 2019-01-24

## 2019-01-23 RX ADMIN — PHENYLEPHRINE HYDROCHLORIDE 100 MCG: 10 INJECTION INTRAVENOUS at 10:06

## 2019-01-23 RX ADMIN — EPHEDRINE SULFATE 5 MG: 50 INJECTION INTRAMUSCULAR; INTRAVENOUS; SUBCUTANEOUS at 10:06

## 2019-01-23 RX ADMIN — TRANEXAMIC ACID 1000 MG: 100 INJECTION, SOLUTION INTRAVENOUS at 10:22

## 2019-01-23 RX ADMIN — FAMOTIDINE 20 MG: 10 INJECTION, SOLUTION INTRAVENOUS at 08:50

## 2019-01-23 RX ADMIN — EPHEDRINE SULFATE 5 MG: 50 INJECTION INTRAMUSCULAR; INTRAVENOUS; SUBCUTANEOUS at 10:38

## 2019-01-23 RX ADMIN — ROCURONIUM BROMIDE 50 MG: 10 INJECTION INTRAVENOUS at 09:05

## 2019-01-23 RX ADMIN — SODIUM CHLORIDE, POTASSIUM CHLORIDE, SODIUM LACTATE AND CALCIUM CHLORIDE 9 ML/HR: 600; 310; 30; 20 INJECTION, SOLUTION INTRAVENOUS at 08:50

## 2019-01-23 RX ADMIN — MIDAZOLAM HYDROCHLORIDE 0.5 MG: 2 INJECTION, SOLUTION INTRAMUSCULAR; INTRAVENOUS at 08:50

## 2019-01-23 RX ADMIN — FENTANYL CITRATE 100 MCG: 50 INJECTION INTRAMUSCULAR; INTRAVENOUS at 09:05

## 2019-01-23 RX ADMIN — SUGAMMADEX 200 MG: 100 INJECTION, SOLUTION INTRAVENOUS at 11:38

## 2019-01-23 RX ADMIN — MUPIROCIN 10 APPLICATION: 20 OINTMENT TOPICAL at 20:43

## 2019-01-23 RX ADMIN — CARVEDILOL 3.12 MG: 3.12 TABLET, FILM COATED ORAL at 18:51

## 2019-01-23 RX ADMIN — ONDANSETRON 4 MG: 2 INJECTION INTRAMUSCULAR; INTRAVENOUS at 11:26

## 2019-01-23 RX ADMIN — ASPIRIN 325 MG: 325 TABLET, DELAYED RELEASE ORAL at 20:43

## 2019-01-23 RX ADMIN — DEXAMETHASONE SODIUM PHOSPHATE 8 MG: 10 INJECTION INTRAMUSCULAR; INTRAVENOUS at 09:17

## 2019-01-23 RX ADMIN — CEFAZOLIN SODIUM 2 G: 2 INJECTION, SOLUTION INTRAVENOUS at 09:13

## 2019-01-23 RX ADMIN — SODIUM CHLORIDE, POTASSIUM CHLORIDE, SODIUM LACTATE AND CALCIUM CHLORIDE: 600; 310; 30; 20 INJECTION, SOLUTION INTRAVENOUS at 11:41

## 2019-01-23 RX ADMIN — ATORVASTATIN CALCIUM 80 MG: 80 TABLET, FILM COATED ORAL at 20:43

## 2019-01-23 RX ADMIN — CEFAZOLIN SODIUM 2 G: 2 INJECTION, SOLUTION INTRAVENOUS at 12:04

## 2019-01-23 RX ADMIN — CEFAZOLIN SODIUM 2 G: 2 INJECTION, SOLUTION INTRAVENOUS at 20:43

## 2019-01-23 RX ADMIN — DOCUSATE SODIUM 100 MG: 100 CAPSULE, LIQUID FILLED ORAL at 20:43

## 2019-01-23 RX ADMIN — TRANEXAMIC ACID 1000 MG: 100 INJECTION, SOLUTION INTRAVENOUS at 09:17

## 2019-01-23 RX ADMIN — PROPOFOL 150 MG: 10 INJECTION, EMULSION INTRAVENOUS at 09:05

## 2019-01-23 RX ADMIN — LIDOCAINE HYDROCHLORIDE 100 MG: 20 INJECTION, SOLUTION INFILTRATION; PERINEURAL at 09:05

## 2019-01-23 NOTE — ANESTHESIA PREPROCEDURE EVALUATION
Anesthesia Evaluation     Patient summary reviewed and Nursing notes reviewed   no history of anesthetic complications:  NPO Solid Status: > 8 hours  NPO Liquid Status: > 2 hours           Airway   Mallampati: II  TM distance: >3 FB  Neck ROM: full  No difficulty expected  Dental - normal exam     Pulmonary - negative pulmonary ROS and normal exam    breath sounds clear to auscultation  Cardiovascular - normal exam    ECG reviewed  Rhythm: regular  Rate: normal    (+) hypertension, past MI  >12 months, CAD, PVD, hyperlipidemia,     ROS comment: Multivessel disease per 2017 cath, nl EF    Neuro/Psych  (-) numbness  GI/Hepatic/Renal/Endo    (+)  GERD,  liver disease (cirrhosis),     Musculoskeletal     (+) radiculopathy  Abdominal  - normal exam   Substance History - negative use     OB/GYN negative ob/gyn ROS         Other   (+) arthritis                     Anesthesia Plan    ASA 3     general     intravenous induction   Anesthetic plan, all risks, benefits, and alternatives have been provided, discussed and informed consent has been obtained with: patient.

## 2019-01-23 NOTE — ANESTHESIA POSTPROCEDURE EVALUATION
Patient: Pamela Durham    Procedure Summary     Date:  01/23/19 Room / Location:  [facility] OR [address] [place] [address] MAIN OR    Anesthesia Start:  0903 Anesthesia Stop:  1152    Procedure:  LEFT TOTAL HIP ARTHROPLASTY MARICRUZ NAVIGATION (Left Hip) Diagnosis:       Arthritis of left hip      (Arthritis of left hip [M16.12])    Surgeon:  Domenico Peñaloza MD Provider:  Gerhard Russo MD    Anesthesia Type:  general ASA Status:  3          Anesthesia Type: general  Last vitals  BP   127/58 (01/23/19 1218)   Temp   36.4 °C (97.5 °F) (01/23/19 1149)   Pulse   82 (01/23/19 1218)   Resp   16 (01/23/19 1218)     SpO2   99 % (01/23/19 1218)     Post Anesthesia Care and Evaluation    Patient location during evaluation: bedside  Patient participation: complete - patient participated  Level of consciousness: awake and alert  Pain management: adequate  Airway patency: patent  Anesthetic complications: No anesthetic complications  PONV Status: controlled  Cardiovascular status: acceptable  Respiratory status: acceptable  Hydration status: acceptable

## 2019-01-23 NOTE — ANESTHESIA PROCEDURE NOTES
Airway  Urgency: elective    Airway not difficult    General Information and Staff    Patient location during procedure: OR  CRNA: Iván Clemens CRNA    Indications and Patient Condition  Indications for airway management: airway protection    Preoxygenated: yes  MILS maintained throughout  Mask difficulty assessment: 1 - vent by mask    Final Airway Details  Final airway type: endotracheal airway      Successful airway: ETT    Successful intubation technique: direct laryngoscopy  Blade: Ifeoma  Blade size: 3  ETT size (mm): 7.0  Cormack-Lehane Classification: grade I - full view of glottis  Placement verified by: chest auscultation   Measured from: teeth  ETT to teeth (cm): 22  Number of attempts at approach: 1

## 2019-01-24 PROBLEM — N18.30 CKD (CHRONIC KIDNEY DISEASE) STAGE 3, GFR 30-59 ML/MIN (HCC): Chronic | Status: ACTIVE | Noted: 2019-01-24

## 2019-01-24 LAB
ALBUMIN SERPL-MCNC: 3.1 G/DL (ref 3.5–5.2)
ALBUMIN/GLOB SERPL: 0.9 G/DL
ALP SERPL-CCNC: 159 U/L (ref 39–117)
ALT SERPL W P-5'-P-CCNC: 22 U/L (ref 1–33)
ANION GAP SERPL CALCULATED.3IONS-SCNC: 14.8 MMOL/L
AST SERPL-CCNC: 51 U/L (ref 1–32)
BILIRUB SERPL-MCNC: 0.8 MG/DL (ref 0.1–1.2)
BUN BLD-MCNC: 12 MG/DL (ref 8–23)
BUN/CREAT SERPL: 13.3 (ref 7–25)
CALCIUM SPEC-SCNC: 9.3 MG/DL (ref 8.6–10.5)
CHLORIDE SERPL-SCNC: 98 MMOL/L (ref 98–107)
CO2 SERPL-SCNC: 23.2 MMOL/L (ref 22–29)
CREAT BLD-MCNC: 0.9 MG/DL (ref 0.57–1)
GFR SERPL CREATININE-BSD FRML MDRD: 60 ML/MIN/1.73
GLOBULIN UR ELPH-MCNC: 3.4 GM/DL
GLUCOSE BLD-MCNC: 145 MG/DL (ref 65–99)
HCT VFR BLD AUTO: 34.6 % (ref 35.6–45.5)
HGB BLD-MCNC: 11.6 G/DL (ref 11.9–15.5)
INR PPP: 1.37 (ref 0.9–1.1)
POTASSIUM BLD-SCNC: 4.1 MMOL/L (ref 3.5–5.2)
PROT SERPL-MCNC: 6.5 G/DL (ref 6–8.5)
PROTHROMBIN TIME: 16.6 SECONDS (ref 11.7–14.2)
SODIUM BLD-SCNC: 136 MMOL/L (ref 136–145)

## 2019-01-24 PROCEDURE — 99024 POSTOP FOLLOW-UP VISIT: CPT | Performed by: NURSE PRACTITIONER

## 2019-01-24 PROCEDURE — 80053 COMPREHEN METABOLIC PANEL: CPT | Performed by: INTERNAL MEDICINE

## 2019-01-24 PROCEDURE — 97150 GROUP THERAPEUTIC PROCEDURES: CPT

## 2019-01-24 PROCEDURE — 85610 PROTHROMBIN TIME: CPT | Performed by: INTERNAL MEDICINE

## 2019-01-24 PROCEDURE — 85014 HEMATOCRIT: CPT | Performed by: NURSE PRACTITIONER

## 2019-01-24 PROCEDURE — 97110 THERAPEUTIC EXERCISES: CPT

## 2019-01-24 PROCEDURE — 85018 HEMOGLOBIN: CPT | Performed by: NURSE PRACTITIONER

## 2019-01-24 PROCEDURE — 63710000001 DIPHENHYDRAMINE PER 50 MG: Performed by: NURSE PRACTITIONER

## 2019-01-24 PROCEDURE — 25010000003 CEFAZOLIN IN DEXTROSE 2-4 GM/100ML-% SOLUTION: Performed by: NURSE PRACTITIONER

## 2019-01-24 RX ORDER — HYDROCODONE BITARTRATE AND ACETAMINOPHEN 7.5; 325 MG/1; MG/1
1 TABLET ORAL EVERY 6 HOURS PRN
Qty: 60 TABLET | Refills: 0
Start: 2019-01-24 | End: 2019-01-28 | Stop reason: HOSPADM

## 2019-01-24 RX ORDER — ACETAMINOPHEN 325 MG/1
325 TABLET ORAL EVERY 4 HOURS PRN
Status: DISCONTINUED | OUTPATIENT
Start: 2019-01-24 | End: 2019-01-28 | Stop reason: HOSPADM

## 2019-01-24 RX ORDER — HYDROCODONE BITARTRATE AND ACETAMINOPHEN 7.5; 325 MG/1; MG/1
1 TABLET ORAL EVERY 6 HOURS PRN
Status: DISCONTINUED | OUTPATIENT
Start: 2019-01-24 | End: 2019-01-28 | Stop reason: HOSPADM

## 2019-01-24 RX ORDER — ONDANSETRON 4 MG/1
4 TABLET, FILM COATED ORAL EVERY 6 HOURS PRN
Qty: 20 TABLET | Refills: 2
Start: 2019-01-24 | End: 2019-03-22

## 2019-01-24 RX ORDER — BISACODYL 5 MG/1
10 TABLET, DELAYED RELEASE ORAL DAILY PRN
Qty: 30 TABLET | Refills: 3
Start: 2019-01-24 | End: 2019-04-03

## 2019-01-24 RX ORDER — MORPHINE SULFATE 15 MG/1
15 TABLET ORAL EVERY 4 HOURS PRN
Status: DISCONTINUED | OUTPATIENT
Start: 2019-01-24 | End: 2019-01-24

## 2019-01-24 RX ADMIN — FERROUS SULFATE TAB 325 MG (65 MG ELEMENTAL FE) 325 MG: 325 (65 FE) TAB at 08:21

## 2019-01-24 RX ADMIN — DIPHENHYDRAMINE HYDROCHLORIDE 25 MG: 25 CAPSULE ORAL at 22:48

## 2019-01-24 RX ADMIN — HYDROCODONE BITARTRATE AND ACETAMINOPHEN 1 TABLET: 7.5; 325 TABLET ORAL at 20:06

## 2019-01-24 RX ADMIN — CARVEDILOL 3.12 MG: 3.12 TABLET, FILM COATED ORAL at 09:46

## 2019-01-24 RX ADMIN — HYDROCODONE BITARTRATE AND ACETAMINOPHEN 1 TABLET: 7.5; 325 TABLET ORAL at 07:01

## 2019-01-24 RX ADMIN — CEFAZOLIN SODIUM 2 G: 2 INJECTION, SOLUTION INTRAVENOUS at 03:34

## 2019-01-24 RX ADMIN — ATORVASTATIN CALCIUM 80 MG: 80 TABLET, FILM COATED ORAL at 08:21

## 2019-01-24 RX ADMIN — ASPIRIN 325 MG: 325 TABLET, DELAYED RELEASE ORAL at 08:21

## 2019-01-24 RX ADMIN — MUPIROCIN: 20 OINTMENT TOPICAL at 08:31

## 2019-01-24 RX ADMIN — DOCUSATE SODIUM 100 MG: 100 CAPSULE, LIQUID FILLED ORAL at 20:06

## 2019-01-24 RX ADMIN — DOCUSATE SODIUM 100 MG: 100 CAPSULE, LIQUID FILLED ORAL at 08:21

## 2019-01-24 RX ADMIN — ASPIRIN 325 MG: 325 TABLET, DELAYED RELEASE ORAL at 20:06

## 2019-01-24 RX ADMIN — MUPIROCIN 10 APPLICATION: 20 OINTMENT TOPICAL at 20:06

## 2019-01-24 RX ADMIN — POLYETHYLENE GLYCOL 3350 17 G: 17 POWDER, FOR SOLUTION ORAL at 08:21

## 2019-01-24 RX ADMIN — FAMOTIDINE 40 MG: 40 TABLET, FILM COATED ORAL at 09:46

## 2019-01-24 RX ADMIN — AMLODIPINE BESYLATE 2.5 MG: 2.5 TABLET ORAL at 08:21

## 2019-01-25 ENCOUNTER — ANESTHESIA EVENT (OUTPATIENT)
Dept: PERIOP | Facility: HOSPITAL | Age: 84
End: 2019-01-25

## 2019-01-25 ENCOUNTER — ANESTHESIA (OUTPATIENT)
Dept: PERIOP | Facility: HOSPITAL | Age: 84
End: 2019-01-25

## 2019-01-25 ENCOUNTER — APPOINTMENT (OUTPATIENT)
Dept: GENERAL RADIOLOGY | Facility: HOSPITAL | Age: 84
End: 2019-01-25

## 2019-01-25 ENCOUNTER — APPOINTMENT (OUTPATIENT)
Dept: GENERAL RADIOLOGY | Facility: HOSPITAL | Age: 84
End: 2019-01-25
Attending: ORTHOPAEDIC SURGERY

## 2019-01-25 LAB
ALBUMIN SERPL-MCNC: 2.8 G/DL (ref 3.5–5.2)
ALBUMIN/GLOB SERPL: 1 G/DL
ALP SERPL-CCNC: 164 U/L (ref 39–117)
ALT SERPL W P-5'-P-CCNC: 14 U/L (ref 1–33)
ANION GAP SERPL CALCULATED.3IONS-SCNC: 10.5 MMOL/L
AST SERPL-CCNC: 59 U/L (ref 1–32)
BILIRUB SERPL-MCNC: 0.6 MG/DL (ref 0.1–1.2)
BUN BLD-MCNC: 13 MG/DL (ref 8–23)
BUN/CREAT SERPL: 13.1 (ref 7–25)
CALCIUM SPEC-SCNC: 8.9 MG/DL (ref 8.6–10.5)
CHLORIDE SERPL-SCNC: 103 MMOL/L (ref 98–107)
CO2 SERPL-SCNC: 27.5 MMOL/L (ref 22–29)
CREAT BLD-MCNC: 0.99 MG/DL (ref 0.57–1)
GFR SERPL CREATININE-BSD FRML MDRD: 54 ML/MIN/1.73
GLOBULIN UR ELPH-MCNC: 2.9 GM/DL
GLUCOSE BLD-MCNC: 142 MG/DL (ref 65–99)
HCT VFR BLD AUTO: 29.9 % (ref 35.6–45.5)
HGB BLD-MCNC: 9.7 G/DL (ref 11.9–15.5)
POTASSIUM BLD-SCNC: 3.8 MMOL/L (ref 3.5–5.2)
PROT SERPL-MCNC: 5.7 G/DL (ref 6–8.5)
SODIUM BLD-SCNC: 141 MMOL/L (ref 136–145)

## 2019-01-25 PROCEDURE — 80053 COMPREHEN METABOLIC PANEL: CPT | Performed by: HOSPITALIST

## 2019-01-25 PROCEDURE — L1830 KO IMMOB CANVAS LONG PRE OTS: HCPCS | Performed by: ORTHOPAEDIC SURGERY

## 2019-01-25 PROCEDURE — 25010000002 FENTANYL CITRATE (PF) 100 MCG/2ML SOLUTION: Performed by: ANESTHESIOLOGY

## 2019-01-25 PROCEDURE — 0SWBXJZ REVISION OF SYNTHETIC SUBSTITUTE IN LEFT HIP JOINT, EXTERNAL APPROACH: ICD-10-PCS | Performed by: ORTHOPAEDIC SURGERY

## 2019-01-25 PROCEDURE — 27266 TREAT HIP DISLOCATION: CPT | Performed by: ORTHOPAEDIC SURGERY

## 2019-01-25 PROCEDURE — 85018 HEMOGLOBIN: CPT | Performed by: NURSE PRACTITIONER

## 2019-01-25 PROCEDURE — 97110 THERAPEUTIC EXERCISES: CPT

## 2019-01-25 PROCEDURE — 72170 X-RAY EXAM OF PELVIS: CPT

## 2019-01-25 PROCEDURE — 73501 X-RAY EXAM HIP UNI 1 VIEW: CPT

## 2019-01-25 PROCEDURE — 25010000002 PHENYLEPHRINE PER 1 ML: Performed by: ANESTHESIOLOGY

## 2019-01-25 PROCEDURE — 85014 HEMATOCRIT: CPT | Performed by: NURSE PRACTITIONER

## 2019-01-25 PROCEDURE — 99024 POSTOP FOLLOW-UP VISIT: CPT | Performed by: NURSE PRACTITIONER

## 2019-01-25 RX ORDER — LABETALOL HYDROCHLORIDE 5 MG/ML
5 INJECTION, SOLUTION INTRAVENOUS
Status: DISCONTINUED | OUTPATIENT
Start: 2019-01-25 | End: 2019-01-25 | Stop reason: HOSPADM

## 2019-01-25 RX ORDER — DIPHENHYDRAMINE HYDROCHLORIDE 50 MG/ML
12.5 INJECTION INTRAMUSCULAR; INTRAVENOUS
Status: DISCONTINUED | OUTPATIENT
Start: 2019-01-25 | End: 2019-01-25 | Stop reason: HOSPADM

## 2019-01-25 RX ORDER — FENTANYL CITRATE 50 UG/ML
50 INJECTION, SOLUTION INTRAMUSCULAR; INTRAVENOUS
Status: DISCONTINUED | OUTPATIENT
Start: 2019-01-25 | End: 2019-01-25 | Stop reason: HOSPADM

## 2019-01-25 RX ORDER — FAMOTIDINE 20 MG/1
20 TABLET, FILM COATED ORAL DAILY
Status: DISCONTINUED | OUTPATIENT
Start: 2019-01-26 | End: 2019-01-25

## 2019-01-25 RX ORDER — SODIUM CHLORIDE 0.9 % (FLUSH) 0.9 %
3-10 SYRINGE (ML) INJECTION AS NEEDED
Status: DISCONTINUED | OUTPATIENT
Start: 2019-01-25 | End: 2019-01-25 | Stop reason: HOSPADM

## 2019-01-25 RX ORDER — FENTANYL CITRATE 50 UG/ML
INJECTION, SOLUTION INTRAMUSCULAR; INTRAVENOUS AS NEEDED
Status: DISCONTINUED | OUTPATIENT
Start: 2019-01-25 | End: 2019-01-25 | Stop reason: SURG

## 2019-01-25 RX ORDER — HYDROCODONE BITARTRATE AND ACETAMINOPHEN 7.5; 325 MG/1; MG/1
2 TABLET ORAL EVERY 4 HOURS PRN
Status: DISCONTINUED | OUTPATIENT
Start: 2019-01-25 | End: 2019-01-28 | Stop reason: HOSPADM

## 2019-01-25 RX ORDER — SODIUM CHLORIDE 0.9 % (FLUSH) 0.9 %
3 SYRINGE (ML) INJECTION EVERY 12 HOURS SCHEDULED
Status: DISCONTINUED | OUTPATIENT
Start: 2019-01-25 | End: 2019-01-25 | Stop reason: HOSPADM

## 2019-01-25 RX ORDER — SODIUM CHLORIDE, SODIUM LACTATE, POTASSIUM CHLORIDE, CALCIUM CHLORIDE 600; 310; 30; 20 MG/100ML; MG/100ML; MG/100ML; MG/100ML
9 INJECTION, SOLUTION INTRAVENOUS CONTINUOUS
Status: DISCONTINUED | OUTPATIENT
Start: 2019-01-25 | End: 2019-01-28 | Stop reason: HOSPADM

## 2019-01-25 RX ORDER — EPHEDRINE SULFATE 50 MG/ML
5 INJECTION, SOLUTION INTRAVENOUS ONCE AS NEEDED
Status: DISCONTINUED | OUTPATIENT
Start: 2019-01-25 | End: 2019-01-25 | Stop reason: HOSPADM

## 2019-01-25 RX ORDER — HYDROCODONE BITARTRATE AND ACETAMINOPHEN 5; 325 MG/1; MG/1
1 TABLET ORAL ONCE AS NEEDED
Status: DISCONTINUED | OUTPATIENT
Start: 2019-01-25 | End: 2019-01-25 | Stop reason: HOSPADM

## 2019-01-25 RX ORDER — FENTANYL CITRATE 50 UG/ML
INJECTION, SOLUTION INTRAMUSCULAR; INTRAVENOUS
Status: COMPLETED
Start: 2019-01-25 | End: 2019-01-25

## 2019-01-25 RX ORDER — ONDANSETRON 2 MG/ML
4 INJECTION INTRAMUSCULAR; INTRAVENOUS ONCE AS NEEDED
Status: DISCONTINUED | OUTPATIENT
Start: 2019-01-25 | End: 2019-01-25 | Stop reason: HOSPADM

## 2019-01-25 RX ORDER — ACETAMINOPHEN 325 MG/1
650 TABLET ORAL ONCE AS NEEDED
Status: DISCONTINUED | OUTPATIENT
Start: 2019-01-25 | End: 2019-01-25 | Stop reason: HOSPADM

## 2019-01-25 RX ORDER — HYDRALAZINE HYDROCHLORIDE 20 MG/ML
5 INJECTION INTRAMUSCULAR; INTRAVENOUS
Status: DISCONTINUED | OUTPATIENT
Start: 2019-01-25 | End: 2019-01-25 | Stop reason: HOSPADM

## 2019-01-25 RX ORDER — FAMOTIDINE 10 MG/ML
20 INJECTION, SOLUTION INTRAVENOUS ONCE
Status: COMPLETED | OUTPATIENT
Start: 2019-01-25 | End: 2019-01-25

## 2019-01-25 RX ORDER — FLUMAZENIL 0.1 MG/ML
0.2 INJECTION INTRAVENOUS AS NEEDED
Status: DISCONTINUED | OUTPATIENT
Start: 2019-01-25 | End: 2019-01-25 | Stop reason: HOSPADM

## 2019-01-25 RX ORDER — HYDROMORPHONE HYDROCHLORIDE 1 MG/ML
0.25 INJECTION, SOLUTION INTRAMUSCULAR; INTRAVENOUS; SUBCUTANEOUS
Status: DISCONTINUED | OUTPATIENT
Start: 2019-01-25 | End: 2019-01-25 | Stop reason: HOSPADM

## 2019-01-25 RX ORDER — FENTANYL CITRATE 50 UG/ML
25 INJECTION, SOLUTION INTRAMUSCULAR; INTRAVENOUS
Status: DISCONTINUED | OUTPATIENT
Start: 2019-01-25 | End: 2019-01-25 | Stop reason: HOSPADM

## 2019-01-25 RX ADMIN — AMLODIPINE BESYLATE 2.5 MG: 2.5 TABLET ORAL at 08:36

## 2019-01-25 RX ADMIN — FENTANYL CITRATE 50 MCG: 50 INJECTION INTRAMUSCULAR; INTRAVENOUS at 19:50

## 2019-01-25 RX ADMIN — PANTOPRAZOLE SODIUM 40 MG: 40 TABLET, DELAYED RELEASE ORAL at 08:36

## 2019-01-25 RX ADMIN — HYDROCODONE BITARTRATE AND ACETAMINOPHEN 1 TABLET: 7.5; 325 TABLET ORAL at 11:27

## 2019-01-25 RX ADMIN — PHENYLEPHRINE HYDROCHLORIDE 100 MCG: 10 INJECTION INTRAVENOUS at 19:50

## 2019-01-25 RX ADMIN — ASPIRIN 325 MG: 325 TABLET, DELAYED RELEASE ORAL at 08:36

## 2019-01-25 RX ADMIN — FERROUS SULFATE TAB 325 MG (65 MG ELEMENTAL FE) 325 MG: 325 (65 FE) TAB at 08:36

## 2019-01-25 RX ADMIN — CARVEDILOL 3.12 MG: 3.12 TABLET, FILM COATED ORAL at 22:15

## 2019-01-25 RX ADMIN — FENTANYL CITRATE 50 MCG: 50 INJECTION, SOLUTION INTRAMUSCULAR; INTRAVENOUS at 19:31

## 2019-01-25 RX ADMIN — SODIUM CHLORIDE, POTASSIUM CHLORIDE, SODIUM LACTATE AND CALCIUM CHLORIDE 9 ML/HR: 600; 310; 30; 20 INJECTION, SOLUTION INTRAVENOUS at 19:30

## 2019-01-25 RX ADMIN — FAMOTIDINE 20 MG: 10 INJECTION, SOLUTION INTRAVENOUS at 19:31

## 2019-01-25 RX ADMIN — CARVEDILOL 3.12 MG: 3.12 TABLET, FILM COATED ORAL at 08:36

## 2019-01-25 RX ADMIN — ATORVASTATIN CALCIUM 80 MG: 80 TABLET, FILM COATED ORAL at 08:36

## 2019-01-25 RX ADMIN — PHENYLEPHRINE HYDROCHLORIDE 100 MCG: 10 INJECTION INTRAVENOUS at 19:55

## 2019-01-25 RX ADMIN — HYDROCODONE BITARTRATE AND ACETAMINOPHEN 1 TABLET: 7.5; 325 TABLET ORAL at 05:06

## 2019-01-25 RX ADMIN — PHENYLEPHRINE HYDROCHLORIDE 100 MCG: 10 INJECTION INTRAVENOUS at 19:53

## 2019-01-26 LAB
ANION GAP SERPL CALCULATED.3IONS-SCNC: 8.2 MMOL/L
BASOPHILS # BLD AUTO: 0.01 10*3/MM3 (ref 0–0.2)
BASOPHILS NFR BLD AUTO: 0.2 % (ref 0–1.5)
BUN BLD-MCNC: 13 MG/DL (ref 8–23)
BUN/CREAT SERPL: 15.7 (ref 7–25)
CALCIUM SPEC-SCNC: 8.6 MG/DL (ref 8.6–10.5)
CHLORIDE SERPL-SCNC: 101 MMOL/L (ref 98–107)
CO2 SERPL-SCNC: 27.8 MMOL/L (ref 22–29)
CREAT BLD-MCNC: 0.83 MG/DL (ref 0.57–1)
DACRYOCYTES BLD QL SMEAR: NORMAL
DEPRECATED RDW RBC AUTO: 49.3 FL (ref 37–54)
EOSINOPHIL # BLD AUTO: 0.07 10*3/MM3 (ref 0–0.7)
EOSINOPHIL NFR BLD AUTO: 1.4 % (ref 0.3–6.2)
ERYTHROCYTE [DISTWIDTH] IN BLOOD BY AUTOMATED COUNT: 15.1 % (ref 11.7–13)
GFR SERPL CREATININE-BSD FRML MDRD: 66 ML/MIN/1.73
GLUCOSE BLD-MCNC: 118 MG/DL (ref 65–99)
HCT VFR BLD AUTO: 27.4 % (ref 35.6–45.5)
HGB BLD-MCNC: 9.2 G/DL (ref 11.9–15.5)
IMM GRANULOCYTES # BLD AUTO: 0.02 10*3/MM3 (ref 0–0.03)
IMM GRANULOCYTES NFR BLD AUTO: 0.4 % (ref 0–0.5)
LYMPHOCYTES # BLD AUTO: 0.93 10*3/MM3 (ref 0.9–4.8)
LYMPHOCYTES NFR BLD AUTO: 19.2 % (ref 19.6–45.3)
MCH RBC QN AUTO: 30 PG (ref 26.9–32)
MCHC RBC AUTO-ENTMCNC: 33.6 G/DL (ref 32.4–36.3)
MCV RBC AUTO: 89.3 FL (ref 80.5–98.2)
MONOCYTES # BLD AUTO: 0.58 10*3/MM3 (ref 0.2–1.2)
MONOCYTES NFR BLD AUTO: 12 % (ref 5–12)
NEUTROPHILS # BLD AUTO: 3.26 10*3/MM3 (ref 1.9–8.1)
NEUTROPHILS NFR BLD AUTO: 67.2 % (ref 42.7–76)
OVALOCYTES BLD QL SMEAR: NORMAL
PLAT MORPH BLD: NORMAL
PLATELET # BLD AUTO: 115 10*3/MM3 (ref 140–500)
PMV BLD AUTO: 11.1 FL (ref 6–12)
POTASSIUM BLD-SCNC: 3.9 MMOL/L (ref 3.5–5.2)
RBC # BLD AUTO: 3.07 10*6/MM3 (ref 3.9–5.2)
SODIUM BLD-SCNC: 137 MMOL/L (ref 136–145)
WBC MORPH BLD: NORMAL
WBC NRBC COR # BLD: 4.85 10*3/MM3 (ref 4.5–10.7)

## 2019-01-26 PROCEDURE — 80048 BASIC METABOLIC PNL TOTAL CA: CPT | Performed by: HOSPITALIST

## 2019-01-26 PROCEDURE — 85025 COMPLETE CBC W/AUTO DIFF WBC: CPT | Performed by: HOSPITALIST

## 2019-01-26 PROCEDURE — 85007 BL SMEAR W/DIFF WBC COUNT: CPT | Performed by: HOSPITALIST

## 2019-01-26 PROCEDURE — 97110 THERAPEUTIC EXERCISES: CPT

## 2019-01-26 RX ADMIN — ATORVASTATIN CALCIUM 80 MG: 80 TABLET, FILM COATED ORAL at 08:08

## 2019-01-26 RX ADMIN — PANTOPRAZOLE SODIUM 40 MG: 40 TABLET, DELAYED RELEASE ORAL at 08:09

## 2019-01-26 RX ADMIN — DOCUSATE SODIUM 100 MG: 100 CAPSULE, LIQUID FILLED ORAL at 08:09

## 2019-01-26 RX ADMIN — POLYETHYLENE GLYCOL 3350 17 G: 17 POWDER, FOR SOLUTION ORAL at 08:09

## 2019-01-26 RX ADMIN — HYDROCODONE BITARTRATE AND ACETAMINOPHEN 1 TABLET: 7.5; 325 TABLET ORAL at 06:27

## 2019-01-26 RX ADMIN — ASPIRIN 325 MG: 325 TABLET, DELAYED RELEASE ORAL at 20:45

## 2019-01-26 RX ADMIN — FERROUS SULFATE TAB 325 MG (65 MG ELEMENTAL FE) 325 MG: 325 (65 FE) TAB at 08:07

## 2019-01-26 RX ADMIN — ASPIRIN 325 MG: 325 TABLET, DELAYED RELEASE ORAL at 08:09

## 2019-01-26 NOTE — ANESTHESIA PREPROCEDURE EVALUATION
Anesthesia Evaluation     Patient summary reviewed and Nursing notes reviewed   no history of anesthetic complications:  NPO Solid Status: > 6 hours  NPO Liquid Status: > 6 hours           Airway   Mallampati: II  TM distance: >3 FB  Neck ROM: full  no difficulty expected and No difficulty expected  Dental - normal exam   (+) implants    Pulmonary - negative pulmonary ROS and normal exam    breath sounds clear to auscultation  (-) rhonchi, decreased breath sounds, wheezes, rales, stridor  Cardiovascular - normal exam    NYHA Classification: I  Rhythm: regular  Rate: normal    (+) hypertension, past MI , CAD, PVD, hyperlipidemia,   (-) murmur, weak pulses, friction rub, systolic click, carotid bruits, JVD, peripheral edema      Neuro/Psych- negative ROS  GI/Hepatic/Renal/Endo    (+)  GERD,  liver disease,     Musculoskeletal     (+) back pain,   Abdominal  - normal exam    Abdomen: soft.   Substance History - negative use     OB/GYN negative ob/gyn ROS         Other   (+) arthritis                     Anesthesia Plan    ASA 3     general     intravenous induction   Anesthetic plan, all risks, benefits, and alternatives have been provided, discussed and informed consent has been obtained with: patient.

## 2019-01-26 NOTE — ANESTHESIA POSTPROCEDURE EVALUATION
"Patient: Pamela Durham    Procedure Summary     Date:  01/25/19 Room / Location:  [facility] OR 09 / [facility] MAIN OR    Anesthesia Start:  1945 Anesthesia Stop:  2014    Procedure:  LT. HIP CLOSED REDUCTION (Left Hip) Diagnosis:      Surgeon:  Domenico Peñaloza MD Provider:  Nicki Benoit MD    Anesthesia Type:  general ASA Status:  3          Anesthesia Type: general  Last vitals  BP   130/56 (01/25/19 2030)   Temp   36.8 °C (98.2 °F) (01/25/19 2005)   Pulse   91 (01/25/19 2030)   Resp   16 (01/25/19 2030)     SpO2   99 % (01/25/19 2030)     Post Anesthesia Care and Evaluation    Patient location during evaluation: PACU  Patient participation: complete - patient participated  Level of consciousness: awake  Pain management: adequate  Airway patency: patent  Anesthetic complications: No anesthetic complications    Cardiovascular status: acceptable  Respiratory status: acceptable  Hydration status: acceptable    Comments: /56   Pulse 91   Temp 36.8 °C (98.2 °F) (Oral)   Resp 16   Ht 160 cm (63\")   Wt 60.1 kg (132 lb 8 oz)   SpO2 99%   BMI 23.47 kg/m²         "

## 2019-01-27 LAB
ANION GAP SERPL CALCULATED.3IONS-SCNC: 10.5 MMOL/L
BASOPHILS # BLD AUTO: 0.01 10*3/MM3 (ref 0–0.2)
BASOPHILS NFR BLD AUTO: 0.2 % (ref 0–1.5)
BUN BLD-MCNC: 15 MG/DL (ref 8–23)
BUN/CREAT SERPL: 17.9 (ref 7–25)
CALCIUM SPEC-SCNC: 8.3 MG/DL (ref 8.6–10.5)
CHLORIDE SERPL-SCNC: 101 MMOL/L (ref 98–107)
CO2 SERPL-SCNC: 26.5 MMOL/L (ref 22–29)
CREAT BLD-MCNC: 0.84 MG/DL (ref 0.57–1)
DEPRECATED RDW RBC AUTO: 50.7 FL (ref 37–54)
EOSINOPHIL # BLD AUTO: 0.1 10*3/MM3 (ref 0–0.7)
EOSINOPHIL NFR BLD AUTO: 1.9 % (ref 0.3–6.2)
ERYTHROCYTE [DISTWIDTH] IN BLOOD BY AUTOMATED COUNT: 15 % (ref 11.7–13)
GFR SERPL CREATININE-BSD FRML MDRD: 65 ML/MIN/1.73
GLUCOSE BLD-MCNC: 127 MG/DL (ref 65–99)
HCT VFR BLD AUTO: 28.3 % (ref 35.6–45.5)
HGB BLD-MCNC: 9.2 G/DL (ref 11.9–15.5)
IMM GRANULOCYTES # BLD AUTO: 0.02 10*3/MM3 (ref 0–0.03)
IMM GRANULOCYTES NFR BLD AUTO: 0.4 % (ref 0–0.5)
LYMPHOCYTES # BLD AUTO: 1.06 10*3/MM3 (ref 0.9–4.8)
LYMPHOCYTES NFR BLD AUTO: 20.4 % (ref 19.6–45.3)
MCH RBC QN AUTO: 30 PG (ref 26.9–32)
MCHC RBC AUTO-ENTMCNC: 32.5 G/DL (ref 32.4–36.3)
MCV RBC AUTO: 92.2 FL (ref 80.5–98.2)
MONOCYTES # BLD AUTO: 0.59 10*3/MM3 (ref 0.2–1.2)
MONOCYTES NFR BLD AUTO: 11.3 % (ref 5–12)
NEUTROPHILS # BLD AUTO: 3.42 10*3/MM3 (ref 1.9–8.1)
NEUTROPHILS NFR BLD AUTO: 65.8 % (ref 42.7–76)
PLATELET # BLD AUTO: 133 10*3/MM3 (ref 140–500)
PMV BLD AUTO: 10.9 FL (ref 6–12)
POTASSIUM BLD-SCNC: 3.7 MMOL/L (ref 3.5–5.2)
RBC # BLD AUTO: 3.07 10*6/MM3 (ref 3.9–5.2)
SODIUM BLD-SCNC: 138 MMOL/L (ref 136–145)
WBC NRBC COR # BLD: 5.2 10*3/MM3 (ref 4.5–10.7)

## 2019-01-27 PROCEDURE — 85025 COMPLETE CBC W/AUTO DIFF WBC: CPT | Performed by: HOSPITALIST

## 2019-01-27 PROCEDURE — 80048 BASIC METABOLIC PNL TOTAL CA: CPT | Performed by: HOSPITALIST

## 2019-01-27 PROCEDURE — 63710000001 DIPHENHYDRAMINE PER 50 MG: Performed by: NURSE PRACTITIONER

## 2019-01-27 PROCEDURE — 97110 THERAPEUTIC EXERCISES: CPT

## 2019-01-27 RX ADMIN — AMLODIPINE BESYLATE 2.5 MG: 2.5 TABLET ORAL at 08:37

## 2019-01-27 RX ADMIN — DOCUSATE SODIUM 100 MG: 100 CAPSULE, LIQUID FILLED ORAL at 21:21

## 2019-01-27 RX ADMIN — ATORVASTATIN CALCIUM 80 MG: 80 TABLET, FILM COATED ORAL at 08:37

## 2019-01-27 RX ADMIN — CARVEDILOL 3.12 MG: 3.12 TABLET, FILM COATED ORAL at 17:23

## 2019-01-27 RX ADMIN — DOCUSATE SODIUM 100 MG: 100 CAPSULE, LIQUID FILLED ORAL at 08:37

## 2019-01-27 RX ADMIN — PANTOPRAZOLE SODIUM 40 MG: 40 TABLET, DELAYED RELEASE ORAL at 08:37

## 2019-01-27 RX ADMIN — DIPHENHYDRAMINE HYDROCHLORIDE 25 MG: 25 CAPSULE ORAL at 21:21

## 2019-01-27 RX ADMIN — FERROUS SULFATE TAB 325 MG (65 MG ELEMENTAL FE) 325 MG: 325 (65 FE) TAB at 08:37

## 2019-01-27 RX ADMIN — ASPIRIN 325 MG: 325 TABLET, DELAYED RELEASE ORAL at 08:37

## 2019-01-27 RX ADMIN — POLYETHYLENE GLYCOL 3350 17 G: 17 POWDER, FOR SOLUTION ORAL at 08:37

## 2019-01-27 RX ADMIN — ASPIRIN 325 MG: 325 TABLET, DELAYED RELEASE ORAL at 21:21

## 2019-01-27 RX ADMIN — CARVEDILOL 3.12 MG: 3.12 TABLET, FILM COATED ORAL at 08:37

## 2019-01-28 VITALS
DIASTOLIC BLOOD PRESSURE: 64 MMHG | HEART RATE: 78 BPM | RESPIRATION RATE: 16 BRPM | BODY MASS INDEX: 23.48 KG/M2 | HEIGHT: 63 IN | OXYGEN SATURATION: 95 % | WEIGHT: 132.5 LBS | SYSTOLIC BLOOD PRESSURE: 130 MMHG | TEMPERATURE: 99.1 F

## 2019-01-28 PROCEDURE — L1686 HO POST-OP HIP ABDUCTION: HCPCS

## 2019-01-28 PROCEDURE — 97110 THERAPEUTIC EXERCISES: CPT

## 2019-01-28 RX ORDER — HYDROCODONE BITARTRATE AND ACETAMINOPHEN 7.5; 325 MG/1; MG/1
TABLET ORAL
Qty: 60 TABLET | Refills: 0
Start: 2019-01-28 | End: 2019-04-03

## 2019-01-28 RX ADMIN — CARVEDILOL 3.12 MG: 3.12 TABLET, FILM COATED ORAL at 17:47

## 2019-01-28 RX ADMIN — DOCUSATE SODIUM 100 MG: 100 CAPSULE, LIQUID FILLED ORAL at 08:29

## 2019-01-28 RX ADMIN — POLYETHYLENE GLYCOL 3350 17 G: 17 POWDER, FOR SOLUTION ORAL at 08:29

## 2019-01-28 RX ADMIN — HYDROCODONE BITARTRATE AND ACETAMINOPHEN 1 TABLET: 7.5; 325 TABLET ORAL at 17:48

## 2019-01-28 RX ADMIN — AMLODIPINE BESYLATE 2.5 MG: 2.5 TABLET ORAL at 08:29

## 2019-01-28 RX ADMIN — PANTOPRAZOLE SODIUM 40 MG: 40 TABLET, DELAYED RELEASE ORAL at 08:29

## 2019-01-28 RX ADMIN — ATORVASTATIN CALCIUM 80 MG: 80 TABLET, FILM COATED ORAL at 08:29

## 2019-01-28 RX ADMIN — CARVEDILOL 3.12 MG: 3.12 TABLET, FILM COATED ORAL at 08:29

## 2019-01-28 RX ADMIN — ASPIRIN 325 MG: 325 TABLET, DELAYED RELEASE ORAL at 08:29

## 2019-01-28 RX ADMIN — FERROUS SULFATE TAB 325 MG (65 MG ELEMENTAL FE) 325 MG: 325 (65 FE) TAB at 08:29

## 2019-01-29 ENCOUNTER — TELEPHONE (OUTPATIENT)
Dept: ORTHOPEDIC SURGERY | Facility: CLINIC | Age: 84
End: 2019-01-29

## 2019-01-29 NOTE — TELEPHONE ENCOUNTER
Spoke with patient's son regarding message.  He voiced understanding and was thankful for the call back.

## 2019-02-06 ENCOUNTER — OFFICE VISIT (OUTPATIENT)
Dept: ORTHOPEDIC SURGERY | Facility: CLINIC | Age: 84
End: 2019-02-06

## 2019-02-06 VITALS — TEMPERATURE: 98.4 F | HEIGHT: 63 IN | WEIGHT: 132 LBS | BODY MASS INDEX: 23.39 KG/M2

## 2019-02-06 DIAGNOSIS — Z96.641 HISTORY OF TOTAL RIGHT HIP REPLACEMENT: Primary | ICD-10-CM

## 2019-02-06 PROCEDURE — 99024 POSTOP FOLLOW-UP VISIT: CPT | Performed by: NURSE PRACTITIONER

## 2019-02-06 PROCEDURE — 73502 X-RAY EXAM HIP UNI 2-3 VIEWS: CPT | Performed by: NURSE PRACTITIONER

## 2019-02-06 NOTE — PATIENT INSTRUCTIONS
DIAGNOSIS: 2 week follow up left total hip arthroplasty    SUBJECTIVE: Patient returns today for 2 week follow up of left total hip replacement. Patient reports doing well with no unusual complaints. Appears to be progressing appropriately.    OBJECTIVE:   Exam:. The incision is healing appropriately. No sign of infection. Range of motion is progressing as expected. The calf is soft and nontender with a negative Homans sign.    DIAGNOSTIC STUDIES  2V AP & Lat of the left hip were done in the office today  Indication, findings and comparison: images were reviewed for evaluation of recent hip replacement. They demonstrate a well positioned, well aligned hip replacement with cables and screw intact. No change in alignment since  F/u xr after closed reduction.     ASSESSMENT: 2 week status post left hip replacement expected healing, s/p closed reduction of hip dislocation 1 week     PLAN: 1) Dressing removed and open to air.  May shower and gently wash incision do not scrub off skin glue it will come off on its own in time.    2) Continue PT and pain medicine (as needed)   3) Continue ice PRN   4) Continue EC Aspirin 325mg by mouth twice a day until 6 weeks post op.   5) Follow up in 4 weeks with repeat Xrays of left hip (2 views)   6) Continue with antibiotic prophylaxis with dental procedures for 2 yrs post total joint replacement.                  7) Discharge Instructions:  Careful transfers and ambulation with 50% wt bearing with walker and assistance and hip abduction brace to left leg at all times except when bathing.  DO NOT flex hip >60 degrees.  DO NOT internally rotate left leg.  In bed use heel pads and decubitus precautions!  Patient is to continue with physical therapy exercises twice daily and continue working with the physical therapist as ordered. Continue to ice regularly. Patient instructed on frequent calf pumping exercises.       Jeni Edwards, APRN  2/6/2019  Seen today by Domenico Peñaloza M.D.  and Jeni Edwards, APRN

## 2019-02-06 NOTE — PROGRESS NOTES
Pamela Durham : 1935 MRN: [id number] DATE: 2019  Body mass index is 23.38 kg/m².  Vitals:    19 1559   Temp: 98.4 °F (36.9 °C)       DIAGNOSIS: 2 week follow up left total hip arthroplasty    SUBJECTIVE: Patient returns today for 2 week follow up of left total hip replacement. Patient reports doing well with no unusual complaints. Appears to be progressing appropriately.    OBJECTIVE:   Exam:. The incision is healing appropriately. No sign of infection. Range of motion is progressing as expected. The calf is soft and nontender with a negative Homans sign.    DIAGNOSTIC STUDIES  AP of the left hip were done in the office today  Indication, findings and comparison: images were reviewed for evaluation of recent hip replacement. They demonstrate a well positioned, well aligned hip replacement with cables and screw intact. No change in alignment since  F/u xr after closed reduction.     ASSESSMENT: 2 week status post left hip replacement expected healing, s/p closed reduction of hip dislocation 1 week     PLAN: 1) Dressing removed and open to air.  May shower and gently wash incision do not scrub off skin glue it will come off on its own in time.    2) Continue PT and pain medicine (as needed)   3) Continue ice PRN   4) Continue EC Aspirin 325mg by mouth twice a day until 6 weeks post op.   5) Follow up in 4 weeks with repeat Xrays of left hip (2 views)   6) Continue with antibiotic prophylaxis with dental procedures for 2 yrs post total joint replacement.                  7) Discharge Instructions:  Careful transfers and ambulation with 50% wt bearing with walker and assistance and hip abduction brace to left leg at all times except when bathing.  DO NOT flex hip >60 degrees.  DO NOT internally rotate left leg.  In bed use heel pads and decubitus precautions!  Patient is to continue with physical therapy exercises twice daily and continue working with the physical therapist as ordered. Continue to ice  regularly. Patient instructed on frequent calf pumping exercises.       Jeni Edwards, YUSEF  2/6/2019  Seen today by Domenico Peñaloza M.D. and Jeni Edwards, YUSEF

## 2019-02-20 ENCOUNTER — TELEPHONE (OUTPATIENT)
Dept: ONCOLOGY | Facility: HOSPITAL | Age: 84
End: 2019-02-20

## 2019-02-20 NOTE — TELEPHONE ENCOUNTER
----- Message from Nilda DONITA Channing sent at 2/20/2019  9:55 AM EST -----  Contact: [phone]  Pt's son is calling concerned about her lab results.  The nursing home was suppose to call us about it  Jesus    Pt's son states that pt is currently in [facility] for rehab following a hip replacement. He said the nursing home home labs last week and her WBC count was low. She is asymptomatic from this. They were supposed to call us and fax labs. We did not receive a call or a fax. Called [facility] and s/w Cony, pt's nurse. She said pt had another set of labs drawn today. She will fax all of them over today.     Received labs from [facility]. WBC 2.3 both today and last week. Plts and ANC also lower. D/W Goldie Dial NP. Per Goldie, since pt is not symptomatic, we can keep her apt with Dr. Garcia next week. No need to move up. Informed pt's son and he v/u.

## 2019-02-28 ENCOUNTER — LAB (OUTPATIENT)
Dept: OTHER | Facility: HOSPITAL | Age: 84
End: 2019-02-28

## 2019-02-28 ENCOUNTER — OFFICE VISIT (OUTPATIENT)
Dept: ONCOLOGY | Facility: CLINIC | Age: 84
End: 2019-02-28

## 2019-02-28 VITALS
BODY MASS INDEX: 26.35 KG/M2 | OXYGEN SATURATION: 97 % | HEART RATE: 65 BPM | RESPIRATION RATE: 18 BRPM | TEMPERATURE: 97.8 F | WEIGHT: 148.7 LBS | HEIGHT: 63 IN | DIASTOLIC BLOOD PRESSURE: 63 MMHG | SYSTOLIC BLOOD PRESSURE: 133 MMHG

## 2019-02-28 DIAGNOSIS — D69.6 THROMBOCYTOPENIA (HCC): Primary | ICD-10-CM

## 2019-02-28 DIAGNOSIS — K74.60 CIRRHOSIS, NON-ALCOHOLIC (HCC): Primary | ICD-10-CM

## 2019-02-28 LAB
BASOPHILS # BLD AUTO: 0.02 10*3/MM3 (ref 0–0.2)
BASOPHILS NFR BLD AUTO: 0.7 % (ref 0–1.5)
DEPRECATED RDW RBC AUTO: 51.4 FL (ref 37–54)
EOSINOPHIL # BLD AUTO: 0.07 10*3/MM3 (ref 0–0.4)
EOSINOPHIL NFR BLD AUTO: 2.3 % (ref 0.3–6.2)
ERYTHROCYTE [DISTWIDTH] IN BLOOD BY AUTOMATED COUNT: 15.6 % (ref 12.3–15.4)
HCT VFR BLD AUTO: 38 % (ref 34–46.6)
HGB BLD-MCNC: 12.4 G/DL (ref 12–15.9)
IMM GRANULOCYTES # BLD AUTO: 0.01 10*3/MM3 (ref 0–0.05)
IMM GRANULOCYTES NFR BLD AUTO: 0.3 % (ref 0–0.5)
LYMPHOCYTES # BLD AUTO: 0.74 10*3/MM3 (ref 0.7–3.1)
LYMPHOCYTES NFR BLD AUTO: 24.3 % (ref 19.6–45.3)
MCH RBC QN AUTO: 29.3 PG (ref 26.6–33)
MCHC RBC AUTO-ENTMCNC: 32.6 G/DL (ref 31.5–35.7)
MCV RBC AUTO: 89.8 FL (ref 79–97)
MONOCYTES # BLD AUTO: 0.26 10*3/MM3 (ref 0.1–0.9)
MONOCYTES NFR BLD AUTO: 8.6 % (ref 5–12)
NEUTROPHILS # BLD AUTO: 1.94 10*3/MM3 (ref 1.4–7)
NEUTROPHILS NFR BLD AUTO: 63.8 % (ref 42.7–76)
NRBC BLD AUTO-RTO: 0 /100 WBC (ref 0–0)
PLATELET # BLD AUTO: 83 10*3/MM3 (ref 140–450)
PMV BLD AUTO: 11.6 FL (ref 6–12)
RBC # BLD AUTO: 4.23 10*6/MM3 (ref 3.77–5.28)
WBC NRBC COR # BLD: 3.04 10*3/MM3 (ref 3.4–10.8)

## 2019-02-28 PROCEDURE — 85025 COMPLETE CBC W/AUTO DIFF WBC: CPT | Performed by: INTERNAL MEDICINE

## 2019-02-28 PROCEDURE — 36415 COLL VENOUS BLD VENIPUNCTURE: CPT

## 2019-02-28 PROCEDURE — 99213 OFFICE O/P EST LOW 20 MIN: CPT | Performed by: INTERNAL MEDICINE

## 2019-02-28 NOTE — PROGRESS NOTES
"  Subjective .     REASONS FOR FOLLOWUP:1.  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     HISTORY OF PRESENT ILLNESS:   Mrs. Durham returns today after successful left hip surgery.  She had complete resolution of her 10/10 pain however is now troubled with dislocation with abduction device around the clock.  She continues very close follow-up with Dr. Peñaloza in this regard the returns today for laboratory studies given her history of steatohepatitis and leukopenia/thrombocytopenia.   Her surgery and hemostasis were uneventful.    Past Medical History:   Diagnosis Date   • Anemia    • Cirrhosis, non-alcoholic (CMS/HCC)    • Gallbladder stone without cholecystitis or obstruction     gallstone seen on ultrasound 2007   • GERD (gastroesophageal reflux disease)    • Health care maintenance    • Hyperlipidemia    • Hypertension    • Lumbar canal stenosis    • Lumbar radiculopathy    • MI (myocardial infarction) (CMS/HCC) 12/25/2016    NON-STEMI   • NSTEMI (non-ST elevated myocardial infarction) (CMS/HCC)    • Osteoarthritis    • Thrombophlebitis of superficial veins of upper extremities     LEFT       ONCOLOGIC HISTORY:  (History from previous dates can be found in the separate document.)   patient is a 81-year-old female who was referred here for evaluation of thrombus cytopenia. She has a history of hypertension and hyperlipidemia.. Her CBC her hemoglobin was 13.0 white count of 3.56 platelet of 73 on May 31, 2017. Looking back even in December 2016 her hemoglobin was 9.2 white count of 7.57\" platelet of 98. She did drop her hemoglobin to 7.9. Today her hemoglobin is back up to 13.8. She has no complaints. She feels reasonably good and is active. She is not on any medications that can affect her platelet count. She is been on Plendil 4 years and we will check if Plendil can cause low platelet count and low white count. Rest of the drugs do not seem to affect her platelet " count. She has no active bleeding. Patient underwent EGD and colonoscopy by Dr. Yeboah and apparently underwent EGD which showed chronic gastritis in December 2016. Colonoscopy showed evidence of multiple angiodysplasias in the ascending colon for which he patient underwent coagulation. Since then patient has not had GI bleeding. Patient also had a non-ST elevation myocardial infarction in March 2017. Which patient is on treatment with medications. Patient has some chronic fatigue otherwise feeling reasonably well. She has lost significant amount of weight in the last 6 months.    Chronic pancytopenia, hemoglobin improved now after patient underwent coagulation of angiodysplasia of the ascending colon. However the white count and the platelet count are still low. I do not believe any of her medications caused her counts to be low. Certainly we can obtain a B12, RBC folate, MIRANDA and rheumatoid factor as well as C-reactive protein and ESR. She'll also obtain a flow cytometry. On examination of the peripheral smear there is decreased platelet counts, there is no evidence of platelet clumps, no evidence of immature cells including blasts. I do not appreciate a splenomegaly on examination. We did discuss about obtaining a bone marrow aspiration and biopsy to rule out underlying myelodysplasia. Patient does not want to go through       Current Outpatient Medications on File Prior to Visit   Medication Sig Dispense Refill   • amLODIPine (NORVASC) 2.5 MG tablet Take 2.5 mg by mouth Daily.     • aspirin  MG EC tablet Take 1 tablet by mouth 2 (Two) Times a Day for 82 doses. 82 tablet 0   • atorvastatin (LIPITOR) 80 MG tablet Take 80 mg by mouth Daily.     • bisacodyl (DULCOLAX) 5 MG EC tablet Take 2 tablets by mouth Daily As Needed for Constipation. 30 tablet 3   • carvedilol (COREG) 3.125 MG tablet Take 3.125 mg by mouth 2 (Two) Times a Day With Meals.     • docusate sodium (COLACE) 100 MG capsule Take 100 mg by mouth 2  "(Two) Times a Day.     • ferrous sulfate 325 (65 FE) MG tablet Take 325 mg by mouth Daily With Breakfast. HOLD PRIOR TO SURGERY     • HYDROcodone-acetaminophen (NORCO) 7.5-325 MG per tablet 1-2 tabs po q 4 hr prn severe pain, wean as tolerated 60 tablet 0   • ondansetron (ZOFRAN) 4 MG tablet Take 1 tablet by mouth Every 6 (Six) Hours As Needed for Nausea or Vomiting. 20 tablet 2   • pantoprazole (PROTONIX) 40 MG EC tablet Take 40 mg by mouth Daily.     • polyethylene glycol (MIRALAX) pack packet Take 17 g by mouth Daily. 30 each 3     No current facility-administered medications on file prior to visit.        ALLERGIES:   No Known Allergies    Social History     Socioeconomic History   • Marital status:      Spouse name: Not on file   • Number of children: Not on file   • Years of education: College   • Highest education level: Not on file   Social Needs   • Financial resource strain: Not on file   • Food insecurity - worry: Not on file   • Food insecurity - inability: Not on file   • Transportation needs - medical: Not on file   • Transportation needs - non-medical: Not on file   Occupational History   • Occupation: Secretarial     Employer: RETIRED   Tobacco Use   • Smoking status: Never Smoker   • Smokeless tobacco: Never Used   Substance and Sexual Activity   • Alcohol use: No   • Drug use: No   • Sexual activity: Defer   Other Topics Concern   • Not on file   Social History Narrative   • Not on file         Cancer-related family history includes Cancer in her maternal grandmother; Liver cancer in her brother; Ovarian cancer in her daughter.     Review of Systems  A comprehensive 14 point review of systems was performed and was negative except as mentioned.    Objective      Vitals:    02/28/19 0907   BP: 133/63   Pulse: 65   Resp: 18   Temp: 97.8 °F (36.6 °C)   SpO2: 97%   Weight: 67.4 kg (148 lb 11.2 oz)   Height: 160 cm (62.99\")   PainSc: 0-No pain     Current Status 2/28/2019   ECOG score 2 "       Physical Exam    GENERAL:  Well-developed, well-nourished in no acute distress.   NECK:  Supple with good range of motion; no thyromegaly or masses, no JVD.  LYMPHATICS:  No cervical, supraclavicular, axillary or inguinal adenopathy.  CHEST:  Lungs clear to auscultation. Good airflow.  CARDIAC:  Regular rate and rhythm without murmurs, rubs or gallops. Normal S1,S2.  ABDOMEN:  Soft, nontender with no hepatosplenomegaly or masses.  EXTREMITIES:  No clubbing, cyanosis or edema.  NEUROLOGICAL:  Cranial Nerves II-XII grossly intact.  No focal neurological deficits.  PSYCHIATRIC:  Normal affect and mood.        RECENT LABS:  Hematology WBC   Date Value Ref Range Status   02/28/2019 3.04 (L) 3.40 - 10.80 10*3/mm3 Final     RBC   Date Value Ref Range Status   02/28/2019 4.23 3.77 - 5.28 10*6/mm3 Final     Hemoglobin   Date Value Ref Range Status   02/28/2019 12.4 12.0 - 15.9 g/dL Final     Hematocrit   Date Value Ref Range Status   02/28/2019 38.0 34.0 - 46.6 % Final     Platelets   Date Value Ref Range Status   02/28/2019 83 (L) 140 - 450 10*3/mm3 Final        Assessment/Plan   Chronic pancytopenia:   Her counts are more typical today though she is at no increased risk of infection and minimal increased bleeding risk.   I will repeat CBC in 2 months and 4 months with her visit at the 4 month interval. That  is likely her final necessary visit.

## 2019-03-06 ENCOUNTER — OFFICE VISIT (OUTPATIENT)
Dept: ORTHOPEDIC SURGERY | Facility: CLINIC | Age: 84
End: 2019-03-06

## 2019-03-06 VITALS — BODY MASS INDEX: 26.35 KG/M2 | HEIGHT: 63 IN | WEIGHT: 148.7 LBS | TEMPERATURE: 98.2 F

## 2019-03-06 DIAGNOSIS — Z96.642 STATUS POST TOTAL REPLACEMENT OF LEFT HIP: Primary | ICD-10-CM

## 2019-03-06 PROCEDURE — 73502 X-RAY EXAM HIP UNI 2-3 VIEWS: CPT | Performed by: NURSE PRACTITIONER

## 2019-03-06 PROCEDURE — 99024 POSTOP FOLLOW-UP VISIT: CPT | Performed by: NURSE PRACTITIONER

## 2019-03-06 NOTE — PATIENT INSTRUCTIONS
DIAGNOSIS: 6 week follow up left total hip arthroplasty    SUBJECTIVE: Patient returns today for 6 week follow up of left total hip replacement. Patient reports doing well with no unusual complaints. Appears to be progressing appropriately.    OBJECTIVE:   Exam:. The incision is healing appropriately. No sign of infection. Range of motion is progressing as expected. The calf is soft and nontender with a negative Homans sign.    DIAGNOSTIC STUDIES  2V AP & Lat of the left hip were done in the office today  Indication, findings and comparison: images were reviewed for evaluation of recent hip replacement. They demonstrate a well positioned, well aligned hip replacement with cables and screw intact. No change in alignment since  F/u xr after closed reduction.     ASSESSMENT: 6 week status post left hip replacement expected healing, s/p closed reduction of hip dislocation 5 weeks    PLAN: 1) Continue PT:  May advance to full wt bearing with walker.   2) Continue ice PRN   3) Discontinue aspirin at this point.  You may now resume Centrum Silver MVI 1 po daily, may resume supplements that you were taking prior to surgery.    5) Follow up in 4 weeks with repeat Xrays of left hip (2 views)   6) Continue with antibiotic prophylaxis with dental procedures for 2 yrs post total joint replacement.                  7) Discharge Instructions:  Careful transfers and ambulation with full wt bearing with walker and assistance and hip abduction brace to left leg at all times except when bathing.  DO NOT flex hip >60 degrees.  DO NOT internally rotate left leg.  Walk every 2 hours during the day while awake. Patient is to continue with physical therapy exercises twice daily and continue working with the physical therapist as ordered. Continue to ice regularly.     YUSEF Maza  3/6/2019  Seen today by Domenico Peñaloza M.D. and YUSEF Russo

## 2019-03-06 NOTE — PROGRESS NOTES
Pamela Durham : 1935 MRN: [id number] DATE: 3/6/2019  Body mass index is 26.34 kg/m².  Vitals:    19 1520   Temp: 98.2 °F (36.8 °C)       DIAGNOSIS: 6 week follow up left total hip arthroplasty    SUBJECTIVE: Patient returns today for 6 week follow up of left total hip replacement. Patient reports doing well with no unusual complaints. Appears to be progressing appropriately.    OBJECTIVE:   Exam:. The incision is healing appropriately. No sign of infection. Range of motion is progressing as expected. The calf is soft and nontender with a negative Homans sign.    DIAGNOSTIC STUDIES  2V AP & Lat of the left hip were done in the office today  Indication, findings and comparison: images were reviewed for evaluation of recent hip replacement. They demonstrate a well positioned, well aligned hip replacement with cables and screw intact. No change in alignment since  F/u xr after closed reduction.     ASSESSMENT: 6 week status post left hip replacement expected healing, s/p closed reduction of hip dislocation 5 weeks    PLAN: 1) Continue PT:  May advance to full wt bearing with walker.   2) Continue ice PRN   3) Discontinue aspirin at this point.  You may now resume Centrum Silver MVI 1 po daily, may resume supplements that you were taking prior to surgery.    5) Follow up in 4 weeks with repeat Xrays of left hip (2 views)   6) Continue with antibiotic prophylaxis with dental procedures for 2 yrs post total joint replacement.                  7) Discharge Instructions:  Careful transfers and ambulation with full wt bearing with walker and assistance and hip abduction brace to left leg at all times except when bathing.  DO NOT flex hip >60 degrees.  DO NOT internally rotate left leg.  Walk every 2 hours during the day while awake. Patient is to continue with physical therapy exercises twice daily and continue working with the physical therapist as ordered. Continue to ice regularly.     Jeni Edwards,  APRN  3/6/2019  Seen today by Domenico Peñaloza M.D. and YUSEF Russo

## 2019-03-07 ENCOUNTER — TELEPHONE (OUTPATIENT)
Dept: ORTHOPEDIC SURGERY | Facility: CLINIC | Age: 84
End: 2019-03-07

## 2019-03-07 NOTE — TELEPHONE ENCOUNTER
LYSSA for PT, Gregg, to return call to office.    As per Jeni's OV note from yesterday:    Continue PT:  May advance to full wt bearing with walker.    Careful transfers and ambulation with full wt bearing with walker and assistance and hip abduction brace to left leg at all times except when bathing.  DO NOT flex hip >60 degrees.  DO NOT internally rotate left leg.  Walk every 2 hours during the day while awake. Patient is to continue with physical therapy exercises twice daily and continue working with the physical therapist as ordered. Continue to ice regularly.

## 2019-03-08 NOTE — TELEPHONE ENCOUNTER
Spoke with Gregg who asked if I could fax him Jeni's note with PT instructions for continuity of care.  Faxed to [facility] [phone].

## 2019-03-21 ENCOUNTER — TELEPHONE (OUTPATIENT)
Dept: ORTHOPEDIC SURGERY | Facility: CLINIC | Age: 84
End: 2019-03-21

## 2019-03-22 ENCOUNTER — OFFICE VISIT (OUTPATIENT)
Dept: CARDIOLOGY | Facility: CLINIC | Age: 84
End: 2019-03-22

## 2019-03-22 ENCOUNTER — APPOINTMENT (OUTPATIENT)
Dept: OTHER | Facility: HOSPITAL | Age: 84
End: 2019-03-22

## 2019-03-22 VITALS
HEIGHT: 63 IN | HEART RATE: 77 BPM | SYSTOLIC BLOOD PRESSURE: 122 MMHG | DIASTOLIC BLOOD PRESSURE: 70 MMHG | WEIGHT: 147 LBS | OXYGEN SATURATION: 97 % | BODY MASS INDEX: 26.05 KG/M2

## 2019-03-22 DIAGNOSIS — I25.10 CORONARY ARTERY DISEASE INVOLVING NATIVE CORONARY ARTERY OF NATIVE HEART WITHOUT ANGINA PECTORIS: Primary | ICD-10-CM

## 2019-03-22 DIAGNOSIS — I10 ESSENTIAL HYPERTENSION: ICD-10-CM

## 2019-03-22 DIAGNOSIS — D69.6 THROMBOCYTOPENIA (HCC): Primary | ICD-10-CM

## 2019-03-22 LAB
BASOPHILS # BLD AUTO: 0.03 10*3/MM3 (ref 0–0.2)
BASOPHILS NFR BLD AUTO: 0.9 % (ref 0–1.5)
DEPRECATED RDW RBC AUTO: 50.7 FL (ref 37–54)
EOSINOPHIL # BLD AUTO: 0.11 10*3/MM3 (ref 0–0.4)
EOSINOPHIL NFR BLD AUTO: 3.4 % (ref 0.3–6.2)
ERYTHROCYTE [DISTWIDTH] IN BLOOD BY AUTOMATED COUNT: 15.9 % (ref 12.3–15.4)
HCT VFR BLD AUTO: 35.2 % (ref 34–46.6)
HGB BLD-MCNC: 11.8 G/DL (ref 12–15.9)
IMM GRANULOCYTES # BLD AUTO: 0.01 10*3/MM3 (ref 0–0.05)
IMM GRANULOCYTES NFR BLD AUTO: 0.3 % (ref 0–0.5)
LYMPHOCYTES # BLD AUTO: 0.83 10*3/MM3 (ref 0.7–3.1)
LYMPHOCYTES NFR BLD AUTO: 25.4 % (ref 19.6–45.3)
MCH RBC QN AUTO: 29.5 PG (ref 26.6–33)
MCHC RBC AUTO-ENTMCNC: 33.5 G/DL (ref 31.5–35.7)
MCV RBC AUTO: 88 FL (ref 79–97)
MONOCYTES # BLD AUTO: 0.3 10*3/MM3 (ref 0.1–0.9)
MONOCYTES NFR BLD AUTO: 9.2 % (ref 5–12)
NEUTROPHILS # BLD AUTO: 1.99 10*3/MM3 (ref 1.4–7)
NEUTROPHILS NFR BLD AUTO: 60.8 % (ref 42.7–76)
NRBC BLD AUTO-RTO: 0 /100 WBC (ref 0–0)
PLATELET # BLD AUTO: 117 10*3/MM3 (ref 140–450)
PLATELET # BLD AUTO: 117 10*3/MM3 (ref 140–450)
PLATELETS.RETICULATED NFR BLD AUTO: 3.2 % (ref 0.9–6.5)
PMV BLD AUTO: 10.9 FL (ref 6–12)
RBC # BLD AUTO: 4 10*6/MM3 (ref 3.77–5.28)
WBC NRBC COR # BLD: 3.27 10*3/MM3 (ref 3.4–10.8)

## 2019-03-22 PROCEDURE — 99214 OFFICE O/P EST MOD 30 MIN: CPT | Performed by: PHYSICIAN ASSISTANT

## 2019-03-22 PROCEDURE — 85055 RETICULATED PLATELET ASSAY: CPT | Performed by: INTERNAL MEDICINE

## 2019-03-22 PROCEDURE — 93000 ELECTROCARDIOGRAM COMPLETE: CPT | Performed by: PHYSICIAN ASSISTANT

## 2019-03-22 PROCEDURE — 85025 COMPLETE CBC W/AUTO DIFF WBC: CPT | Performed by: INTERNAL MEDICINE

## 2019-03-22 NOTE — PROGRESS NOTES
Date of Office Visit: 2019  Encounter Provider: MIKE Adam  Place of Service: [facility] CARDIOLOGY  Patient Name: Pamela Durham  :1935    Chief Complaint   Patient presents with   • Coronary Artery Disease     1 Year follow up   • Hypertension   :     HPI: Pamela Durham is a 83 y.o. female, new to me, who presents today for follow-up.  Old records have been obtained and reviewed by me.  She is a patient of Dr. Earl's with a past medical history significant for coronary artery disease.  She was found to have a non-STEMI during a hospitalization.  That same hospitalization she was found to have multiple colonic angiodysplastic lesions that were treated with argon plasma coagulation.  She ultimately underwent cardiac catheterization which showed a  of the circumflex, 40% RCA, and OM1 branch that filled via left to left collaterals, and a 70% LAD lesion.  No intervention was performed secondary to the fact that she was asymptomatic and because she was at high risk of bleeding.  We have managed her medically since.  She was last in our office to see Dr. Earl on 2018.  At that visit she was doing well without complaints of angina or heart failure.  She had not been taking her felodipine, and her blood pressure was elevated.  Dr. Earl discontinued her felodipine and started her on amlodipine 2.5 mg daily.  She is here today for yearly visit.   Last in our office she is been doing fairly well.  She recently had hip surgery and has been recovering from that.  Evidently she also had a dislocation after her hip surgery and is now in a brace.  She was in pretty significant pain prior to her surgery.  She just got home from rehab 2 days ago and has not restarted her medications.  As far as blood pressure goes, she has Norvasc 2.5 mg daily and carvedilol 3.125 mg twice daily.  She has not taken these yet.  She denies any chest pain, shortness of breath,  palpitations, edema, dizziness, or syncope.      Past Medical History:   Diagnosis Date   • Anemia    • Cirrhosis, non-alcoholic (CMS/HCC)    • Gallbladder stone without cholecystitis or obstruction     gallstone seen on ultrasound 2007   • GERD (gastroesophageal reflux disease)    • Health care maintenance    • Hyperlipidemia    • Hypertension    • Lumbar canal stenosis    • Lumbar radiculopathy    • MI (myocardial infarction) (CMS/HCC) 12/25/2016    NON-STEMI   • NSTEMI (non-ST elevated myocardial infarction) (CMS/[facility])    • Osteoarthritis    • Thrombophlebitis of superficial veins of upper extremities     LEFT       Past Surgical History:   Procedure Laterality Date   • CARDIAC CATHETERIZATION N/A 3/1/2017    Procedure: Coronary angiography;  Surgeon: Desean Earl MD;  Location: [facility] CATH INVASIVE LOCATION;  Service:    • COLONOSCOPY N/A 12/27/2016    Procedure: COLONOSCOPY INTO CECUM WITH ARGON PLASMA COAGULATION;  Surgeon: Javier Peñaloza MD;  Location: [facility] ENDOSCOPY;  Service:    • ENDOSCOPY N/A 12/27/2016    Procedure: ESOPHAGOGASTRODUODENOSCOPY WITH COLD BIOPSIES;  Surgeon: Javier Peñaloza MD;  Location: [facility] ENDOSCOPY;  Service:    • HIP ARTHROPLASTY Right 04/01/2015   • HIP CLOSED REDUCTION Left 1/25/2019    Procedure: LT. HIP CLOSED REDUCTION;  Surgeon: Domenico Peñaloza MD;  Location: [facility] OR;  Service: Orthopedics   • HYSTERECTOMY  1986   • JOINT REPLACEMENT     • KNEE SURGERY Bilateral     2007 & 2008   • LUNG SURGERY  1965   • TONSILLECTOMY     • TOTAL HIP ARTHROPLASTY Left 1/23/2019    Procedure: LEFT TOTAL HIP ARTHROPLASTY MARICRUZ NAVIGATION;  Surgeon: Domenico Peñaloza MD;  Location: [facility] OR;  Service: Orthopedics       Social History     Socioeconomic History   • Marital status:      Spouse name: Not on file   • Number of children: Not on file   • Years of education: College   • Highest education level: Not on file   Occupational History   • Occupation:  Secretarial     Employer: RETIRED   Tobacco Use   • Smoking status: Never Smoker   • Smokeless tobacco: Never Used   Substance and Sexual Activity   • Alcohol use: No   • Drug use: No   • Sexual activity: Defer       Family History   Problem Relation Age of Onset   • Hypertension Other    • Heart disease Other    • Liver cancer Brother    • Ovarian cancer Daughter         diagnosed 2012   • No Known Problems Mother    • Heart disease Father    • Heart attack Father    • Hypertension Father    • Cancer Maternal Grandmother    • No Known Problems Maternal Grandfather    • No Known Problems Paternal Grandmother    • No Known Problems Paternal Grandfather    • Malig Hyperthermia Neg Hx        Review of Systems   Constitution: Negative for chills, fever and malaise/fatigue.   Cardiovascular: Negative for chest pain, dyspnea on exertion, leg swelling, near-syncope, orthopnea, palpitations, paroxysmal nocturnal dyspnea and syncope.   Respiratory: Negative for cough and shortness of breath.    Musculoskeletal: Negative for joint pain, joint swelling and myalgias.   Gastrointestinal: Negative for abdominal pain, diarrhea, melena, nausea and vomiting.   Genitourinary: Negative for frequency and hematuria.   Neurological: Negative for light-headedness, numbness, paresthesias and seizures.   Allergic/Immunologic: Negative.    All other systems reviewed and are negative.      No Known Allergies      Current Outpatient Medications:   •  amLODIPine (NORVASC) 2.5 MG tablet, Take 2.5 mg by mouth Daily., Disp: , Rfl:   •  atorvastatin (LIPITOR) 80 MG tablet, Take 80 mg by mouth Daily., Disp: , Rfl:   •  bisacodyl (DULCOLAX) 5 MG EC tablet, Take 2 tablets by mouth Daily As Needed for Constipation., Disp: 30 tablet, Rfl: 3  •  carvedilol (COREG) 3.125 MG tablet, Take 3.125 mg by mouth 2 (Two) Times a Day With Meals., Disp: , Rfl:   •  docusate sodium (COLACE) 100 MG capsule, Take 100 mg by mouth 2 (Two) Times a Day., Disp: , Rfl:   •   "ferrous sulfate 325 (65 FE) MG tablet, Take 325 mg by mouth Daily With Breakfast. HOLD PRIOR TO SURGERY, Disp: , Rfl:   •  HYDROcodone-acetaminophen (NORCO) 7.5-325 MG per tablet, 1-2 tabs po q 4 hr prn severe pain, wean as tolerated, Disp: 60 tablet, Rfl: 0  •  pantoprazole (PROTONIX) 40 MG EC tablet, Take 40 mg by mouth Daily., Disp: , Rfl:   •  polyethylene glycol (MIRALAX) pack packet, Take 17 g by mouth Daily., Disp: 30 each, Rfl: 3      Objective:     Vitals:    03/22/19 1443 03/22/19 1445   BP: 120/60 122/70   BP Location: Right arm Left arm   Pulse: 77    SpO2: 97%    Weight: 66.7 kg (147 lb)    Height: 160 cm (63\")      Body mass index is 26.04 kg/m².    PHYSICAL EXAM:    Physical Exam   Constitutional: She is oriented to person, place, and time. She appears well-developed and well-nourished. No distress.   HENT:   Head: Normocephalic and atraumatic.   Eyes: Pupils are equal, round, and reactive to light.   Neck: No JVD present. No thyromegaly present.   Cardiovascular: Normal rate, regular rhythm, normal heart sounds and intact distal pulses.   No murmur heard.  Pulmonary/Chest: Effort normal and breath sounds normal. No respiratory distress.   Abdominal: Soft. Bowel sounds are normal. She exhibits no distension. There is no splenomegaly or hepatomegaly. There is no tenderness.   Musculoskeletal: Normal range of motion. She exhibits no edema.   Brace on left leg   Neurological: She is alert and oriented to person, place, and time.   Skin: Skin is warm and dry. She is not diaphoretic. No erythema.   Psychiatric: She has a normal mood and affect. Her behavior is normal. Judgment normal.         ECG 12 Lead  Date/Time: 3/22/2019 3:02 PM  Performed by: Amelia Bowman PA  Authorized by: Amelia Bowman PA   Comparison: compared with previous ECG from 1/16/2019  Similar to previous ECG  Rhythm: sinus rhythm  BPM: 71  T flattening: I and aVL    Clinical impression: non-specific ECG  Comments: Indication: " Coronary disease              Assessment:       Diagnosis Plan   1. Coronary artery disease involving native coronary artery of native heart without angina pectoris  ECG 12 Lead   2. Essential hypertension       Orders Placed This Encounter   Procedures   • ECG 12 Lead     This order was created via procedure documentation          Plan:       1.  Coronary Artery Disease  Assessment  • The patient has no angina    Subjective - Objective  • There is a history of past MI  • Overall she is doing well.  She had a non-STEMI in the setting of a GI bleed.  This ultimately led to cardiac catheterization which showed moderate coronary artery disease that we have been managing medically.  She has been a poor candidate for anticoagulation with her history of GI bleed.  She has no complaints of angina today.    2.  Hypertension.  Her blood pressure is perfect today and she has not taken any of her medications.  I think that she was probably hypertensive with her significant hip pain, and now that this is resolved her blood pressure is normalized.  I think that if she is going to restart anything I would choose the carvedilol as it has other benefits of such as blood pressure control.  I did discuss this with the patient and her son.    I am not going to make any changes, and she will follow-up with Dr. Earl in 1 year or sooner if needed.    As always, it has been a pleasure to participate in your patient's care.      Sincerely,         Amelia Bowman PA-C

## 2019-03-26 ENCOUNTER — TELEPHONE (OUTPATIENT)
Dept: ORTHOPEDIC SURGERY | Facility: CLINIC | Age: 84
End: 2019-03-26

## 2019-04-03 ENCOUNTER — OFFICE VISIT (OUTPATIENT)
Dept: ORTHOPEDIC SURGERY | Facility: CLINIC | Age: 84
End: 2019-04-03

## 2019-04-03 VITALS — TEMPERATURE: 97.2 F | HEIGHT: 63 IN | BODY MASS INDEX: 24.8 KG/M2 | WEIGHT: 140 LBS

## 2019-04-03 DIAGNOSIS — M16.12 ARTHRITIS OF LEFT HIP: ICD-10-CM

## 2019-04-03 DIAGNOSIS — Z96.642 STATUS POST TOTAL REPLACEMENT OF LEFT HIP: Primary | ICD-10-CM

## 2019-04-03 PROCEDURE — 99213 OFFICE O/P EST LOW 20 MIN: CPT | Performed by: NURSE PRACTITIONER

## 2019-04-03 PROCEDURE — 73502 X-RAY EXAM HIP UNI 2-3 VIEWS: CPT | Performed by: NURSE PRACTITIONER

## 2019-04-03 NOTE — PATIENT INSTRUCTIONS
Chief Complaint and HPI: 12 weeks follow up left total hip    SUBJECTIVE:Patient returns today for follow up of total joint replacement. Patient reports doing well with no unusual complaints. Appears to be progressing appropriately. She has been using her left hip abd brace and progressing with strength and stability.     OBJECTIVE:   Exam:. The incision is healing appropriately. No sign of infection. Range of motion is progressing as expected. The calf is soft and nontender with a negative Homans sign.    DIAGNOSTIC STUDIES  Imaging done today, images were personally viewed and discussed with the patient:    Indication, findings and comparison:2V AP&Lat of the operative joint were done in the office today  images were reviewed for evaluation of recent joint replacement. They demonstrate a well positioned, well aligned total joint replacement without complicating factors noted. In comparison with previous films there has been no alignment change.    ASSESSMENT: status post left total hip replacement expected healing.    PLAN: 1) Continue with PT exercises as prescribed   2) Follow up 1 MONTHS  WITH XRAYS (2 views of same joint).   3) Continue with antibiotic prophylaxis with dental procedures for 2 yrs post total joint replacement.   4) May discontinue anticoagulant therapy (such as aspirin or xarelto) from surgery at this time and resume medications, vitamins, and supplements you were taking before surgery.    5) Instructions:  Careful transfers and ambulation with full wt bearing with walker and assistance and hip abduction brace to left leg while walking and every night.  DO NOT flex hip >90 degrees.  DO NOT internally rotate left leg.  Walk every 2 hours during the day while awake. Patient is to continue with physical therapy exercises twice daily.     Jeni Edwards, APRN  4/3/2019

## 2019-04-03 NOTE — PROGRESS NOTES
Pamela Durham : 1935 MRN: [id number] DATE: 4/3/2019  Body mass index is 24.8 kg/m².  Vitals:    19 1442   Temp: 97.2 °F (36.2 °C)       Chief Complaint and HPI: 12 weeks follow up left total hip    SUBJECTIVE:Patient returns today for follow up of total joint replacement. Patient reports doing well with no unusual complaints. Appears to be progressing appropriately. She has been using her left hip abd brace and progressing with strength and stability.     OBJECTIVE:   Exam:. The incision is healing appropriately. No sign of infection. Range of motion is progressing as expected. The calf is soft and nontender with a negative Homans sign.    DIAGNOSTIC STUDIES  Imaging done today, images were personally viewed and discussed with the patient:    Indication, findings and comparison:2V AP&Lat of the operative joint were done in the office today  images were reviewed for evaluation of recent joint replacement. They demonstrate a well positioned, well aligned total joint replacement without complicating factors noted. In comparison with previous films there has been no alignment change.    ASSESSMENT: status post left total hip replacement expected healing.    PLAN: 1) Continue with PT exercises as prescribed   2) Follow up 1 MONTHS  WITH XRAYS (2 views of same joint).   3) Continue with antibiotic prophylaxis with dental procedures for 2 yrs post total joint replacement.   4) May discontinue anticoagulant therapy (such as aspirin or xarelto) from surgery at this time and resume medications, vitamins, and supplements you were taking before surgery.    5) Instructions:  Careful transfers and ambulation with full wt bearing with walker and assistance and hip abduction brace to left leg while walking and every night.  DO NOT flex hip >90 degrees.  DO NOT internally rotate left leg.  Walk every 2 hours during the day while awake. Patient is to continue with physical therapy exercises twice daily.     Jeni LEWIS  YUSEF Edwards  4/3/2019

## 2019-04-18 RX ORDER — CARVEDILOL 3.12 MG/1
TABLET ORAL
Qty: 180 TABLET | Refills: 3 | Status: SHIPPED | OUTPATIENT
Start: 2019-04-18 | End: 2021-03-23

## 2019-04-18 RX ORDER — AMLODIPINE BESYLATE 2.5 MG/1
2.5 TABLET ORAL DAILY
Qty: 90 TABLET | Refills: 3 | Status: SHIPPED | OUTPATIENT
Start: 2019-04-18 | End: 2019-05-01

## 2019-04-25 ENCOUNTER — APPOINTMENT (OUTPATIENT)
Dept: OTHER | Facility: HOSPITAL | Age: 84
End: 2019-04-25

## 2019-04-25 DIAGNOSIS — D69.6 THROMBOCYTOPENIA (HCC): Primary | ICD-10-CM

## 2019-04-25 LAB
BASOPHILS # BLD AUTO: 0.01 10*3/MM3 (ref 0–0.2)
BASOPHILS NFR BLD AUTO: 0.3 % (ref 0–1.5)
DEPRECATED RDW RBC AUTO: 49.6 FL (ref 37–54)
EOSINOPHIL # BLD AUTO: 0.1 10*3/MM3 (ref 0–0.4)
EOSINOPHIL NFR BLD AUTO: 2.7 % (ref 0.3–6.2)
ERYTHROCYTE [DISTWIDTH] IN BLOOD BY AUTOMATED COUNT: 15.9 % (ref 12.3–15.4)
HCT VFR BLD AUTO: 38.6 % (ref 34–46.6)
HGB BLD-MCNC: 12.7 G/DL (ref 12–15.9)
IMM GRANULOCYTES # BLD AUTO: 0 10*3/MM3 (ref 0–0.05)
IMM GRANULOCYTES NFR BLD AUTO: 0 % (ref 0–0.5)
LYMPHOCYTES # BLD AUTO: 1.06 10*3/MM3 (ref 0.7–3.1)
LYMPHOCYTES NFR BLD AUTO: 28.7 % (ref 19.6–45.3)
MCH RBC QN AUTO: 28.2 PG (ref 26.6–33)
MCHC RBC AUTO-ENTMCNC: 32.9 G/DL (ref 31.5–35.7)
MCV RBC AUTO: 85.6 FL (ref 79–97)
MONOCYTES # BLD AUTO: 0.29 10*3/MM3 (ref 0.1–0.9)
MONOCYTES NFR BLD AUTO: 7.9 % (ref 5–12)
NEUTROPHILS # BLD AUTO: 2.23 10*3/MM3 (ref 1.7–7)
NEUTROPHILS NFR BLD AUTO: 60.4 % (ref 42.7–76)
NRBC BLD AUTO-RTO: 0 /100 WBC (ref 0–0.2)
PLATELET # BLD AUTO: 100 10*3/MM3 (ref 140–450)
PMV BLD AUTO: 11.1 FL (ref 6–12)
RBC # BLD AUTO: 4.51 10*6/MM3 (ref 3.77–5.28)
WBC NRBC COR # BLD: 3.69 10*3/MM3 (ref 3.4–10.8)

## 2019-04-25 PROCEDURE — 85025 COMPLETE CBC W/AUTO DIFF WBC: CPT | Performed by: INTERNAL MEDICINE

## 2019-05-01 ENCOUNTER — OFFICE VISIT (OUTPATIENT)
Dept: ORTHOPEDIC SURGERY | Facility: CLINIC | Age: 84
End: 2019-05-01

## 2019-05-01 VITALS — BODY MASS INDEX: 24.8 KG/M2 | HEIGHT: 63 IN | WEIGHT: 140 LBS | TEMPERATURE: 98 F

## 2019-05-01 DIAGNOSIS — Z96.642 STATUS POST TOTAL REPLACEMENT OF LEFT HIP: Primary | ICD-10-CM

## 2019-05-01 PROCEDURE — 99213 OFFICE O/P EST LOW 20 MIN: CPT | Performed by: ORTHOPAEDIC SURGERY

## 2019-05-01 PROCEDURE — 73502 X-RAY EXAM HIP UNI 2-3 VIEWS: CPT | Performed by: ORTHOPAEDIC SURGERY

## 2019-05-01 NOTE — PROGRESS NOTES
Patient Name: Pamela Durham   YOB: 1935  Referring Primary Care Physician: Crista Casillas MD  BMI: Body mass index is 24.8 kg/m².    Chief Complaint:    Chief Complaint   Patient presents with   • Left Hip - Follow-up        HPI:     Pamela Durham is a 83 y.o. female who presents today for evaluation of   Chief Complaint   Patient presents with   • Left Hip - Follow-up   .  Pamela follows up today after a left total hip replacement which she had at the end of January.  She had cabling of her femur as there was nondisplaced fracture upon inserting the stem.  She also had acetabular dysplasia and had a single dislocation event postop.  Otherwise she is been doing well and quite functional able to get motorized scooters at the grocery store and she does use a walker but is getting where she does not need that as much.    This problem is not new to this examiner.     Subjective   Medications:   Home Medications:  Current Outpatient Medications on File Prior to Visit   Medication Sig   • atorvastatin (LIPITOR) 80 MG tablet Take 80 mg by mouth Daily.   • carvedilol (COREG) 3.125 MG tablet TAKE 1 TABLET BY MOUTH EVERY 12 HOURS   • ferrous sulfate 325 (65 FE) MG tablet Take 325 mg by mouth Daily With Breakfast. HOLD PRIOR TO SURGERY   • pantoprazole (PROTONIX) 40 MG EC tablet Take 40 mg by mouth Daily.   • [DISCONTINUED] amLODIPine (NORVASC) 2.5 MG tablet Take 2.5 mg by mouth Daily.   • [DISCONTINUED] amLODIPine (NORVASC) 2.5 MG tablet TAKE 1 TABLET BY MOUTH DAILY     No current facility-administered medications on file prior to visit.      Current Medications:  Scheduled Meds:  Continuous Infusions:  No current facility-administered medications for this visit.   PRN Meds:.    I have reviewed the patient's medical history in detail and updated the computerized patient record.  Review and summarization of old records includes:    Past Medical History:   Diagnosis Date   • Anemia    • Cirrhosis, non-alcoholic  (CMS/[facility])    • Gallbladder stone without cholecystitis or obstruction     gallstone seen on ultrasound 2007   • GERD (gastroesophageal reflux disease)    • Health care maintenance    • Hyperlipidemia    • Hypertension    • Lumbar canal stenosis    • Lumbar radiculopathy    • MI (myocardial infarction) (CMS/[facility]) 12/25/2016    NON-STEMI   • NSTEMI (non-ST elevated myocardial infarction) (CMS/[facility])    • Osteoarthritis    • Thrombophlebitis of superficial veins of upper extremities     LEFT        Past Surgical History:   Procedure Laterality Date   • CARDIAC CATHETERIZATION N/A 3/1/2017    Procedure: Coronary angiography;  Surgeon: Desean Earl MD;  Location: [facility] CATH INVASIVE LOCATION;  Service:    • COLONOSCOPY N/A 12/27/2016    Procedure: COLONOSCOPY INTO CECUM WITH ARGON PLASMA COAGULATION;  Surgeon: Javier Peñaloza MD;  Location: [facility] ENDOSCOPY;  Service:    • ENDOSCOPY N/A 12/27/2016    Procedure: ESOPHAGOGASTRODUODENOSCOPY WITH COLD BIOPSIES;  Surgeon: Javier Peñaloza MD;  Location: [facility] ENDOSCOPY;  Service:    • HIP ARTHROPLASTY Right 04/01/2015   • HIP CLOSED REDUCTION Left 1/25/2019    Procedure: LT. HIP CLOSED REDUCTION;  Surgeon: Domenico Peñaloza MD;  Location: [facility] OR;  Service: Orthopedics   • HYSTERECTOMY  1986   • JOINT REPLACEMENT     • KNEE SURGERY Bilateral     2007 & 2008   • LUNG SURGERY  1965   • TONSILLECTOMY     • TOTAL HIP ARTHROPLASTY Left 1/23/2019    Procedure: LEFT TOTAL HIP ARTHROPLASTY MARICRUZ NAVIGATION;  Surgeon: Domenico Peñaloza MD;  Location: [facility] OR;  Service: Orthopedics        Social History     Occupational History   • Occupation: Secretarial     Employer: RETIRED   Tobacco Use   • Smoking status: Never Smoker   • Smokeless tobacco: Never Used   Substance and Sexual Activity   • Alcohol use: No   • Drug use: No   • Sexual activity: Defer    Social History     Social History Narrative   • Not on file        Family History   Problem Relation Age of Onset  "  • Hypertension Other    • Heart disease Other    • Liver cancer Brother    • Ovarian cancer Daughter         diagnosed 2012   • No Known Problems Mother    • Heart disease Father    • Heart attack Father    • Hypertension Father    • Cancer Maternal Grandmother    • No Known Problems Maternal Grandfather    • No Known Problems Paternal Grandmother    • No Known Problems Paternal Grandfather    • Malig Hyperthermia Neg Hx        ROS: 14 point review of systems was performed and all other systems were reviewed and are negative except for documented findings in HPI and today's encounter.     Allergies: No Known Allergies  Constitutional:  Denies fever, shaking or chills   Eyes:  Denies change in visual acuity   HENT:  Denies nasal congestion or sore throat   Respiratory:  Denies cough or shortness of breath   Cardiovascular:  Denies chest pain or severe LE edema   GI:  Denies abdominal pain, nausea, vomiting, bloody stools or diarrhea   Musculoskeletal:  Numbness, tingling, pain, or loss of motor function only as noted above in history of present illness.  : Denies painful urination or hematuria  Integument:  Denies rash, lesion or ulceration   Neurologic:  Denies headache or focal weakness  Endocrine:  Denies lymphadenopathy  Psych:  Denies confusion or change in mental status   Hem:  Denies active bleeding    OBJECTIVE:  Physical Exam:   Temp 98 °F (36.7 °C)   Ht 160 cm (63\")   Wt 63.5 kg (140 lb)   BMI 24.80 kg/m²     General Appearance:    Alert, cooperative, in no acute distress                  Eyes: conjunctiva clear  ENT: external ears and nose atraumatic  CV: no peripheral edema  Resp: normal respiratory effort  Skin: no rashes or wounds; normal turgor  Psych: mood and affect appropriate  Lymph: no nodes appreciated  Neuro: gross sensation intact  Vascular:  Palpable peripheral pulse in noted extremity  Musculoskeletal Extremities: Able to get up out of the chair transfer and ambulate with a small limp " the walker definitely helps and she is no optically tender    Radiology:   AP of the hips lateral left hip taken the office today for total joint follow-up show good leg lengths with good position and healing of her periprosthetic nondisplaced fracture    Assessment:     ICD-10-CM ICD-9-CM   1. Status post total replacement of left hip Z96.642 V43.64        Procedures       Plan: Biomechanics of pertinent body area discussed.  Risks, benefits, alternatives, comparisons, and complications of accepted medicines, injections, recommendations, surgical procedures, and therapies explained and education provided in laymen's terms. Natural history and expected course of this patient's diagnosis discussed along with evaluation of therapies. Questions answered. When appropriate I also discussed proper use of cane, walker, trekking poles. Gave her recommendations for total joints in safe positions antibiotics etc. her back in about 4 months with an AP of the hips lateral left hip      5/1/2019    Much of this encounter note is an electronic transcription/translation of spoken language to printed text. The electronic translation of spoken language may permit erroneous, or at times, nonsensical words or phrases to be inadvertently transcribed; Although I have reviewed the note for such errors, some may still exist

## 2019-06-20 ENCOUNTER — APPOINTMENT (OUTPATIENT)
Dept: OTHER | Facility: HOSPITAL | Age: 84
End: 2019-06-20

## 2019-06-20 ENCOUNTER — APPOINTMENT (OUTPATIENT)
Dept: ONCOLOGY | Facility: CLINIC | Age: 84
End: 2019-06-20

## 2019-06-20 ENCOUNTER — OFFICE VISIT (OUTPATIENT)
Dept: ONCOLOGY | Facility: CLINIC | Age: 84
End: 2019-06-20

## 2019-06-20 VITALS
RESPIRATION RATE: 16 BRPM | BODY MASS INDEX: 25.78 KG/M2 | OXYGEN SATURATION: 94 % | TEMPERATURE: 97.9 F | DIASTOLIC BLOOD PRESSURE: 72 MMHG | SYSTOLIC BLOOD PRESSURE: 164 MMHG | WEIGHT: 145.5 LBS | HEIGHT: 63 IN | HEART RATE: 70 BPM

## 2019-06-20 DIAGNOSIS — D69.6 THROMBOCYTOPENIA (HCC): Primary | ICD-10-CM

## 2019-06-20 LAB
BASOPHILS # BLD AUTO: 0.02 10*3/MM3 (ref 0–0.2)
BASOPHILS NFR BLD AUTO: 0.6 % (ref 0–1.5)
DEPRECATED RDW RBC AUTO: 52.5 FL (ref 37–54)
EOSINOPHIL # BLD AUTO: 0.12 10*3/MM3 (ref 0–0.4)
EOSINOPHIL NFR BLD AUTO: 3.4 % (ref 0.3–6.2)
ERYTHROCYTE [DISTWIDTH] IN BLOOD BY AUTOMATED COUNT: 17 % (ref 12.3–15.4)
HCT VFR BLD AUTO: 39.7 % (ref 34–46.6)
HGB BLD-MCNC: 13.4 G/DL (ref 12–15.9)
IMM GRANULOCYTES # BLD AUTO: 0.02 10*3/MM3 (ref 0–0.05)
IMM GRANULOCYTES NFR BLD AUTO: 0.6 % (ref 0–0.5)
LYMPHOCYTES # BLD AUTO: 1.1 10*3/MM3 (ref 0.7–3.1)
LYMPHOCYTES NFR BLD AUTO: 31.3 % (ref 19.6–45.3)
MCH RBC QN AUTO: 28.6 PG (ref 26.6–33)
MCHC RBC AUTO-ENTMCNC: 33.8 G/DL (ref 31.5–35.7)
MCV RBC AUTO: 84.6 FL (ref 79–97)
MONOCYTES # BLD AUTO: 0.31 10*3/MM3 (ref 0.1–0.9)
MONOCYTES NFR BLD AUTO: 8.8 % (ref 5–12)
NEUTROPHILS # BLD AUTO: 1.95 10*3/MM3 (ref 1.7–7)
NEUTROPHILS NFR BLD AUTO: 55.3 % (ref 42.7–76)
NRBC BLD AUTO-RTO: 0 /100 WBC (ref 0–0.2)
PLATELET # BLD AUTO: 88 10*3/MM3 (ref 140–450)
PMV BLD AUTO: 11.1 FL (ref 6–12)
RBC # BLD AUTO: 4.69 10*6/MM3 (ref 3.77–5.28)
WBC NRBC COR # BLD: 3.52 10*3/MM3 (ref 3.4–10.8)

## 2019-06-20 PROCEDURE — 36415 COLL VENOUS BLD VENIPUNCTURE: CPT

## 2019-06-20 PROCEDURE — 99213 OFFICE O/P EST LOW 20 MIN: CPT | Performed by: NURSE PRACTITIONER

## 2019-06-20 PROCEDURE — 85025 COMPLETE CBC W/AUTO DIFF WBC: CPT | Performed by: INTERNAL MEDICINE

## 2019-06-20 NOTE — PROGRESS NOTES
"  Subjective .     REASONS FOR FOLLOWUP:1.  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     HISTORY OF PRESENT ILLNESS:   Mrs. Durham returns today for lab evaluation for the above mentioned history. She is feeling well and is still recovering status-post hip surgery 6 months ago. Her labs today are all within normal limits with the exception of progressive thrombocytopenia. Platelets are 88,000. She denies any excess bleeding or bruising. No abdominal pain or early satiety noted.     She has no other concerns today.     Past Medical History:   Diagnosis Date   • Anemia    • Cirrhosis, non-alcoholic (CMS/HCC)    • Gallbladder stone without cholecystitis or obstruction     gallstone seen on ultrasound 2007   • GERD (gastroesophageal reflux disease)    • Health care maintenance    • Hyperlipidemia    • Hypertension    • Lumbar canal stenosis    • Lumbar radiculopathy    • MI (myocardial infarction) (CMS/HCC) 12/25/2016    NON-STEMI   • NSTEMI (non-ST elevated myocardial infarction) (CMS/HCC)    • Osteoarthritis    • Thrombophlebitis of superficial veins of upper extremities     LEFT       ONCOLOGIC HISTORY:  (History from previous dates can be found in the separate document.)   patient is a 81-year-old female who was referred here for evaluation of thrombus cytopenia. She has a history of hypertension and hyperlipidemia.. Her CBC her hemoglobin was 13.0 white count of 3.56 platelet of 73 on May 31, 2017. Looking back even in December 2016 her hemoglobin was 9.2 white count of 7.57\" platelet of 98. She did drop her hemoglobin to 7.9. Today her hemoglobin is back up to 13.8. She has no complaints. She feels reasonably good and is active. She is not on any medications that can affect her platelet count. She is been on Plendil 4 years and we will check if Plendil can cause low platelet count and low white count. Rest of the drugs do not seem to affect her platelet count. " She has no active bleeding. Patient underwent EGD and colonoscopy by Dr. Yeboah and apparently underwent EGD which showed chronic gastritis in December 2016. Colonoscopy showed evidence of multiple angiodysplasias in the ascending colon for which he patient underwent coagulation. Since then patient has not had GI bleeding. Patient also had a non-ST elevation myocardial infarction in March 2017. Which patient is on treatment with medications. Patient has some chronic fatigue otherwise feeling reasonably well. She has lost significant amount of weight in the last 6 months.    Chronic pancytopenia, hemoglobin improved now after patient underwent coagulation of angiodysplasia of the ascending colon. However the white count and the platelet count are still low. I do not believe any of her medications caused her counts to be low. Certainly we can obtain a B12, RBC folate, MIRANDA and rheumatoid factor as well as C-reactive protein and ESR. She'll also obtain a flow cytometry. On examination of the peripheral smear there is decreased platelet counts, there is no evidence of platelet clumps, no evidence of immature cells including blasts. I do not appreciate a splenomegaly on examination. We did discuss about obtaining a bone marrow aspiration and biopsy to rule out underlying myelodysplasia. Patient does not want to go through       Current Outpatient Medications on File Prior to Visit   Medication Sig Dispense Refill   • atorvastatin (LIPITOR) 80 MG tablet Take 80 mg by mouth Daily.     • BIOTIN PO Take  by mouth.     • carvedilol (COREG) 3.125 MG tablet TAKE 1 TABLET BY MOUTH EVERY 12 HOURS 180 tablet 3   • ferrous sulfate 325 (65 FE) MG tablet Take 325 mg by mouth Daily With Breakfast. HOLD PRIOR TO SURGERY     • Multiple Vitamin (MULTI VITAMIN DAILY PO) Take  by mouth. Centrum silver     • pantoprazole (PROTONIX) 40 MG EC tablet Take 40 mg by mouth Daily.     • VITAMIN E PO Take  by mouth.       No current  "facility-administered medications on file prior to visit.        ALLERGIES:   No Known Allergies    Social History     Socioeconomic History   • Marital status:      Spouse name: Not on file   • Number of children: Not on file   • Years of education: College   • Highest education level: Not on file   Occupational History   • Occupation: Secretarial     Employer: RETIRED   Tobacco Use   • Smoking status: Never Smoker   • Smokeless tobacco: Never Used   Substance and Sexual Activity   • Alcohol use: No   • Drug use: No   • Sexual activity: Defer         Cancer-related family history includes Cancer in her maternal grandmother; Liver cancer in her brother; Ovarian cancer in her daughter.     Review of Systems  A comprehensive 14 point review of systems was performed and was negative except as mentioned.    Objective      Vitals:    06/20/19 1534   BP: 164/72   Pulse: 70   Resp: 16   Temp: 97.9 °F (36.6 °C)   TempSrc: Oral   SpO2: 94%   Weight: 66 kg (145 lb 8 oz)   Height: 160 cm (62.99\")   PainSc: 0-No pain     Current Status 6/20/2019   ECOG score 1       Physical Exam    GENERAL:  Well-developed, well-nourished in no acute distress.   NECK:  Supple with good range of motion; no thyromegaly or masses, no JVD.  LYMPHATICS:  No cervical, supraclavicular, axillary or inguinal adenopathy.  CHEST:  Lungs clear to auscultation. Good airflow.  CARDIAC:  Regular rate and rhythm without murmurs, rubs or gallops. Normal S1,S2.  ABDOMEN:  Soft, nontender with no hepatosplenomegaly or masses.  EXTREMITIES:  No clubbing, cyanosis or edema.  NEUROLOGICAL:  Cranial Nerves II-XII grossly intact.  No focal neurological deficits.  PSYCHIATRIC:  Normal affect and mood.        RECENT LABS:  Hematology WBC   Date Value Ref Range Status   06/20/2019 3.52 3.40 - 10.80 10*3/mm3 Final     RBC   Date Value Ref Range Status   06/20/2019 4.69 3.77 - 5.28 10*6/mm3 Final     Hemoglobin   Date Value Ref Range Status   06/20/2019 13.4 12.0 - " 15.9 g/dL Final     Hematocrit   Date Value Ref Range Status   06/20/2019 39.7 34.0 - 46.6 % Final     Platelets   Date Value Ref Range Status   06/20/2019 88 (L) 140 - 450 10*3/mm3 Final        Assessment/Plan   Chronic pancytopenia:  Her total white blood cell count and ANC are within normal limits today. Her platelets have declined slightly at 88k. She denies any recent viral etiology and is without bleeding. This is not far from her baseline of 100k. After review with Dr. Bruce, we will have the patient return in 6 months for lab and RN review and one month for MD visit.     -I have asked her to call with any issues prior to this time.    She has verbalized understanding.

## 2019-07-31 ENCOUNTER — OFFICE (INPATIENT)
Dept: URBAN - METROPOLITAN AREA CLINIC 66 | Facility: CLINIC | Age: 84
End: 2019-07-31

## 2019-07-31 VITALS
HEIGHT: 63 IN | HEART RATE: 76 BPM | DIASTOLIC BLOOD PRESSURE: 78 MMHG | SYSTOLIC BLOOD PRESSURE: 150 MMHG | WEIGHT: 148 LBS

## 2019-07-31 DIAGNOSIS — K75.81 NONALCOHOLIC STEATOHEPATITIS (NASH): ICD-10-CM

## 2019-07-31 DIAGNOSIS — K74.69 OTHER CIRRHOSIS OF LIVER: ICD-10-CM

## 2019-07-31 PROCEDURE — 99213 OFFICE O/P EST LOW 20 MIN: CPT | Performed by: INTERNAL MEDICINE

## 2019-08-01 ENCOUNTER — TRANSCRIBE ORDERS (OUTPATIENT)
Dept: ADMINISTRATIVE | Facility: HOSPITAL | Age: 84
End: 2019-08-01

## 2019-08-01 DIAGNOSIS — K75.81 NONALCOHOLIC STEATOHEPATITIS: Primary | ICD-10-CM

## 2019-08-05 ENCOUNTER — HOSPITAL ENCOUNTER (OUTPATIENT)
Dept: ULTRASOUND IMAGING | Facility: HOSPITAL | Age: 84
Discharge: HOME | End: 2019-08-05
Admitting: INTERNAL MEDICINE

## 2019-08-05 DIAGNOSIS — K75.81 NONALCOHOLIC STEATOHEPATITIS: ICD-10-CM

## 2019-08-05 PROCEDURE — 76705 ECHO EXAM OF ABDOMEN: CPT

## 2019-08-13 ENCOUNTER — TRANSCRIBE ORDERS (OUTPATIENT)
Dept: ADMINISTRATIVE | Facility: HOSPITAL | Age: 84
End: 2019-08-13

## 2019-08-13 ENCOUNTER — LAB (OUTPATIENT)
Dept: LAB | Facility: HOSPITAL | Age: 84
End: 2019-08-13

## 2019-08-13 DIAGNOSIS — K74.69 FLORID CIRRHOSIS (HCC): ICD-10-CM

## 2019-08-13 DIAGNOSIS — K75.81 NONALCOHOLIC STEATOHEPATITIS: ICD-10-CM

## 2019-08-13 DIAGNOSIS — K75.81 NONALCOHOLIC STEATOHEPATITIS: Primary | ICD-10-CM

## 2019-08-13 LAB
ALBUMIN SERPL-MCNC: 3.8 G/DL (ref 3.5–5.2)
ALBUMIN/GLOB SERPL: 1.3 G/DL
ALP SERPL-CCNC: 133 U/L (ref 39–117)
ALPHA-FETOPROTEIN: 4.48 NG/ML (ref 0–8.3)
ALT SERPL W P-5'-P-CCNC: 26 U/L (ref 1–33)
ANION GAP SERPL CALCULATED.3IONS-SCNC: 11 MMOL/L (ref 5–15)
AST SERPL-CCNC: 46 U/L (ref 1–32)
BILIRUB SERPL-MCNC: 0.7 MG/DL (ref 0.2–1.2)
BUN BLD-MCNC: 11 MG/DL (ref 8–23)
BUN/CREAT SERPL: 13.4 (ref 7–25)
CALCIUM SPEC-SCNC: 9.4 MG/DL (ref 8.6–10.5)
CHLORIDE SERPL-SCNC: 104 MMOL/L (ref 98–107)
CO2 SERPL-SCNC: 27 MMOL/L (ref 22–29)
CREAT BLD-MCNC: 0.82 MG/DL (ref 0.57–1)
GFR SERPL CREATININE-BSD FRML MDRD: 67 ML/MIN/1.73
GLOBULIN UR ELPH-MCNC: 3 GM/DL
GLUCOSE BLD-MCNC: 136 MG/DL (ref 65–99)
POTASSIUM BLD-SCNC: 4.3 MMOL/L (ref 3.5–5.2)
PROT SERPL-MCNC: 6.8 G/DL (ref 6–8.5)
SODIUM BLD-SCNC: 142 MMOL/L (ref 136–145)

## 2019-08-13 PROCEDURE — 82105 ALPHA-FETOPROTEIN SERUM: CPT | Performed by: INTERNAL MEDICINE

## 2019-08-13 PROCEDURE — 36415 COLL VENOUS BLD VENIPUNCTURE: CPT

## 2019-08-13 PROCEDURE — 80053 COMPREHEN METABOLIC PANEL: CPT | Performed by: INTERNAL MEDICINE

## 2019-09-03 ENCOUNTER — OFFICE VISIT (OUTPATIENT)
Dept: ORTHOPEDIC SURGERY | Facility: CLINIC | Age: 84
End: 2019-09-03

## 2019-09-03 VITALS — HEIGHT: 63 IN | WEIGHT: 146 LBS | TEMPERATURE: 98.3 F | BODY MASS INDEX: 25.87 KG/M2

## 2019-09-03 DIAGNOSIS — Z96.642 STATUS POST TOTAL REPLACEMENT OF LEFT HIP: Primary | ICD-10-CM

## 2019-09-03 PROCEDURE — 99213 OFFICE O/P EST LOW 20 MIN: CPT | Performed by: ORTHOPAEDIC SURGERY

## 2019-09-03 PROCEDURE — 73502 X-RAY EXAM HIP UNI 2-3 VIEWS: CPT | Performed by: ORTHOPAEDIC SURGERY

## 2019-09-03 NOTE — PROGRESS NOTES
Patient Name: Pamela Durham   YOB: 1935  Referring Primary Care Physician: Crista Casillas MD  BMI: Body mass index is 25.86 kg/m².    Chief Complaint:    Chief Complaint   Patient presents with   • Left Hip - Follow-up        HPI:     Pamela Durham is a 83 y.o. female who presents today for evaluation of   Chief Complaint   Patient presents with   • Left Hip - Follow-up   .  Patient is seen today about 8 or 9 months status post left total hip arthroplasty.  She had a very worn hip with osteopenia and had acetabular dysplasia.  She had had aPersonal periprosthetic nondisplaced fracture that healed well with cables.  She had a single dislocation event postop immediately but is since been very stable since.  She is also been able to for example crawl on the floor to look for cat etc.  She is now up ambulating after therapy using a Rollator walker next walks quite well.  She been in a motorized scooter before.  She is having excellent pain relief.  She is seen with her son    This problem is not new to this examiner.     Subjective   Medications:   Home Medications:  Current Outpatient Medications on File Prior to Visit   Medication Sig   • atorvastatin (LIPITOR) 80 MG tablet Take 80 mg by mouth Daily.   • BIOTIN PO Take  by mouth.   • carvedilol (COREG) 3.125 MG tablet TAKE 1 TABLET BY MOUTH EVERY 12 HOURS   • ferrous sulfate 325 (65 FE) MG tablet Take 325 mg by mouth Daily With Breakfast. HOLD PRIOR TO SURGERY   • Multiple Vitamin (MULTI VITAMIN DAILY PO) Take  by mouth. Centrum silver   • pantoprazole (PROTONIX) 40 MG EC tablet Take 40 mg by mouth Daily.   • VITAMIN E PO Take  by mouth.     No current facility-administered medications on file prior to visit.      Current Medications:  Scheduled Meds:  Continuous Infusions:  No current facility-administered medications for this visit.   PRN Meds:.    I have reviewed the patient's medical history in detail and updated the computerized patient record.   Review and summarization of old records includes:    Past Medical History:   Diagnosis Date   • Anemia    • Cirrhosis, non-alcoholic (CMS/HCC)    • Gallbladder stone without cholecystitis or obstruction     gallstone seen on ultrasound 2007   • GERD (gastroesophageal reflux disease)    • Health care maintenance    • Hyperlipidemia    • Hypertension    • Lumbar canal stenosis    • Lumbar radiculopathy    • MI (myocardial infarction) (CMS/HCC) 12/25/2016    NON-STEMI   • NSTEMI (non-ST elevated myocardial infarction) (CMS/[facility])    • Osteoarthritis    • Thrombophlebitis of superficial veins of upper extremities     LEFT        Past Surgical History:   Procedure Laterality Date   • CARDIAC CATHETERIZATION N/A 3/1/2017    Procedure: Coronary angiography;  Surgeon: Desean Earl MD;  Location: [facility] CATH INVASIVE LOCATION;  Service:    • COLONOSCOPY N/A 12/27/2016    Procedure: COLONOSCOPY INTO CECUM WITH ARGON PLASMA COAGULATION;  Surgeon: Javier Peñaloza MD;  Location: [facility] ENDOSCOPY;  Service:    • ENDOSCOPY N/A 12/27/2016    Procedure: ESOPHAGOGASTRODUODENOSCOPY WITH COLD BIOPSIES;  Surgeon: Javier Peñaloza MD;  Location: [facility] ENDOSCOPY;  Service:    • HIP ARTHROPLASTY Right 04/01/2015   • HIP CLOSED REDUCTION Left 1/25/2019    Procedure: LT. HIP CLOSED REDUCTION;  Surgeon: Domenico Peñaloza MD;  Location: [facility] OR;  Service: Orthopedics   • HYSTERECTOMY  1986   • JOINT REPLACEMENT     • KNEE SURGERY Bilateral     2007 & 2008   • LUNG SURGERY  1965   • TONSILLECTOMY     • TOTAL HIP ARTHROPLASTY Left 1/23/2019    Procedure: LEFT TOTAL HIP ARTHROPLASTY MARICRUZ NAVIGATION;  Surgeon: Domenico Peñaloza MD;  Location: [facility] OR;  Service: Orthopedics        Social History     Occupational History   • Occupation: Secretarial     Employer: RETIRED   Tobacco Use   • Smoking status: Never Smoker   • Smokeless tobacco: Never Used   Substance and Sexual Activity   • Alcohol use: No   • Drug use: No   • Sexual  "activity: Defer    Social History     Social History Narrative   • Not on file        Family History   Problem Relation Age of Onset   • Hypertension Other    • Heart disease Other    • Liver cancer Brother    • Ovarian cancer Daughter         diagnosed 2012   • No Known Problems Mother    • Heart disease Father    • Heart attack Father    • Hypertension Father    • Cancer Maternal Grandmother    • No Known Problems Maternal Grandfather    • No Known Problems Paternal Grandmother    • No Known Problems Paternal Grandfather    • Malig Hyperthermia Neg Hx        ROS: 14 point review of systems was performed and all other systems were reviewed and are negative except for documented findings in HPI and today's encounter.     Allergies: No Known Allergies  Constitutional:  Denies fever, shaking or chills   Eyes:  Denies change in visual acuity   HENT:  Denies nasal congestion or sore throat   Respiratory:  Denies cough or shortness of breath   Cardiovascular:  Denies chest pain or severe LE edema   GI:  Denies abdominal pain, nausea, vomiting, bloody stools or diarrhea   Musculoskeletal:  Numbness, tingling, pain, or loss of motor function only as noted above in history of present illness.  : Denies painful urination or hematuria  Integument:  Denies rash, lesion or ulceration   Neurologic:  Denies headache or focal weakness  Endocrine:  Denies lymphadenopathy  Psych:  Denies confusion or change in mental status   Hem:  Denies active bleeding    OBJECTIVE:  Physical Exam: 83 y.o. female  Wt Readings from Last 3 Encounters:   09/03/19 66.2 kg (146 lb)   06/20/19 66 kg (145 lb 8 oz)   05/01/19 63.5 kg (140 lb)     Ht Readings from Last 1 Encounters:   09/03/19 160 cm (63\")     Body mass index is 25.86 kg/m².  Vitals:    09/03/19 1605   Temp: 98.3 °F (36.8 °C)     Vital signs reviewed.     General Appearance:    Alert, cooperative, in no acute distress                  Eyes: conjunctiva clear  ENT: external ears and nose " atraumatic  CV: no peripheral edema  Resp: normal respiratory effort  Skin: no rashes or wounds; normal turgor  Psych: mood and affect appropriate  Lymph: no nodes appreciated  Neuro: gross sensation intact  Vascular:  Palpable peripheral pulse in noted extremity  Musculoskeletal Extremities: Walking well walker and a slight limp without it.  She also holds her hand wide for balance as much as anything not tender in the hip    Radiology:   AP of the hips lateral left hip show excellent position of her total hip arthroplasty on the left with good leg lengths and previous right hip arthroplasty compared to old x-rays    Assessment:     ICD-10-CM ICD-9-CM   1. Status post total replacement of left hip Z96.642 V43.64        Procedures       Plan: EXERCISES:  Advice on benefits of, and types of regular/moderate exercise including biomechanical forces involved as it pertains to this complaint.attenuation of moderate exercise using her walker and therapy.  She is done quite well with see her back she had problems      9/3/2019    Much of this encounter note is an electronic transcription/translation of spoken language to printed text. The electronic translation of spoken language may permit erroneous, or at times, nonsensical words or phrases to be inadvertently transcribed; Although I have reviewed the note for such errors, some may still exist

## 2019-11-07 ENCOUNTER — OFFICE VISIT (OUTPATIENT)
Dept: INTERNAL MEDICINE | Facility: CLINIC | Age: 84
End: 2019-11-07

## 2019-11-07 VITALS
HEIGHT: 63 IN | HEART RATE: 76 BPM | DIASTOLIC BLOOD PRESSURE: 80 MMHG | SYSTOLIC BLOOD PRESSURE: 174 MMHG | OXYGEN SATURATION: 98 % | WEIGHT: 153 LBS | BODY MASS INDEX: 27.11 KG/M2

## 2019-11-07 DIAGNOSIS — I10 ESSENTIAL HYPERTENSION: ICD-10-CM

## 2019-11-07 DIAGNOSIS — D69.6 THROMBOCYTOPENIA (HCC): ICD-10-CM

## 2019-11-07 DIAGNOSIS — I25.10 CORONARY ARTERY DISEASE INVOLVING NATIVE CORONARY ARTERY OF NATIVE HEART WITHOUT ANGINA PECTORIS: ICD-10-CM

## 2019-11-07 DIAGNOSIS — Z00.00 MEDICARE ANNUAL WELLNESS VISIT, SUBSEQUENT: Primary | ICD-10-CM

## 2019-11-07 DIAGNOSIS — K74.60 CIRRHOSIS, NON-ALCOHOLIC (HCC): ICD-10-CM

## 2019-11-07 DIAGNOSIS — E78.5 HYPERLIPIDEMIA, UNSPECIFIED HYPERLIPIDEMIA TYPE: ICD-10-CM

## 2019-11-07 PROBLEM — N18.30 CKD (CHRONIC KIDNEY DISEASE) STAGE 3, GFR 30-59 ML/MIN (HCC): Chronic | Status: RESOLVED | Noted: 2019-01-24 | Resolved: 2019-11-07

## 2019-11-07 LAB
CHOLEST SERPL-MCNC: 155 MG/DL (ref 0–200)
HDLC SERPL-MCNC: 48 MG/DL (ref 40–60)
LDLC SERPL CALC-MCNC: 86 MG/DL (ref 0–100)
TRIGL SERPL-MCNC: 106 MG/DL (ref 0–150)
VLDLC SERPL-MCNC: 21.2 MG/DL

## 2019-11-07 PROCEDURE — G0439 PPPS, SUBSEQ VISIT: HCPCS | Performed by: INTERNAL MEDICINE

## 2019-11-07 PROCEDURE — 99213 OFFICE O/P EST LOW 20 MIN: CPT | Performed by: INTERNAL MEDICINE

## 2019-11-07 RX ORDER — LISINOPRIL 5 MG/1
5 TABLET ORAL DAILY
Qty: 90 TABLET | Refills: 1 | Status: SHIPPED | OUTPATIENT
Start: 2019-11-07 | End: 2020-08-24

## 2019-11-07 NOTE — PROGRESS NOTES
The ABCs of the Annual Wellness Visit  Subsequent Medicare Wellness Visit    Chief Complaint   Patient presents with   • Medicare Wellness-subsequent   • Hypertension   • Hyperlipidemia       Subjective   History of Present Illness:  Pamela Durham is a 84 y.o. female who presents for a Subsequent Medicare Wellness Visit.    HEALTH RISK ASSESSMENT    Recent Hospitalizations:  Recently treated at the following:  [facility]    Current Medical Providers:  Patient Care Team:  Crista Casillas MD as PCP - General (Internal Medicine)  Domenico Peñaloza MD as PCP - Claims Attributed  Crista Casillas MD as Referring Physician (Internal Medicine)  Leyla Gutiérrez MD as Consulting Physician (Hematology and Oncology)  Desean Earl MD as Consulting Physician (Cardiology)  Javier Peñaloza MD as Consulting Physician (Gastroenterology)  Domenico Garcia MD as Consulting Physician (Hematology and Oncology)    Smoking Status:  Social History     Tobacco Use   Smoking Status Never Smoker   Smokeless Tobacco Never Used       Alcohol Consumption:  Social History     Substance and Sexual Activity   Alcohol Use No       Depression Screen:   PHQ-2/PHQ-9 Depression Screening 11/7/2019   Little interest or pleasure in doing things 0   Feeling down, depressed, or hopeless 0   Trouble falling or staying asleep, or sleeping too much 0   Feeling tired or having little energy 0   Poor appetite or overeating 0   Feeling bad about yourself - or that you are a failure or have let yourself or your family down 0   Trouble concentrating on things, such as reading the newspaper or watching television 0   Moving or speaking so slowly that other people could have noticed. Or the opposite - being so fidgety or restless that you have been moving around a lot more than usual 0   Thoughts that you would be better off dead, or of hurting yourself in some way 0   Total Score 0       Fall Risk Screen:  NEELAMADI Fall Risk Assessment was  completed, and patient is at MODERATE risk for falls. Assessment completed on:11/7/2019    Health Habits and Functional and Cognitive Screening:  Functional & Cognitive Status 11/7/2019   Do you have difficulty preparing food and eating? No   Do you have difficulty bathing yourself, getting dressed or grooming yourself? No   Do you have difficulty using the toilet? No   Do you have difficulty moving around from place to place? No   Do you have trouble with steps or getting out of a bed or a chair? No   Current Diet Well Balanced Diet   Dental Exam Up to date   Eye Exam Up to date   Exercise (times per week) (No Data)   Current Exercise Activities Include -   Do you need help using the phone?  No   Are you deaf or do you have serious difficulty hearing?  No   Do you need help with transportation? No   Do you need help shopping? No   Do you need help preparing meals?  No   Do you need help with housework?  No   Do you need help with laundry? No   Do you need help taking your medications? No   Do you need help managing money? No   Do you ever drive or ride in a car without wearing a seat belt? No   Have you felt unusual stress, anger or loneliness in the last month? No   Who do you live with? Alone   If you need help, do you have trouble finding someone available to you? No   Have you been bothered in the last four weeks by sexual problems? No   Do you have difficulty concentrating, remembering or making decisions? No         Does the patient have evidence of cognitive impairment? No    Asprin use counseling:Does not need ASA (and currently is not on it)    Age-appropriate Screening Schedule:  Refer to the list below for future screening recommendations based on patient's age, sex and/or medical conditions. Orders for these recommended tests are listed in the plan section. The patient has been provided with a written plan.    Health Maintenance   Topic Date Due   • TDAP/TD VACCINES (1 - Tdap) 11/07/1954   • ZOSTER  VACCINE (2 of 3) 11/06/2006   • LIPID PANEL  11/26/2019   • MAMMOGRAM  09/06/2020   • INFLUENZA VACCINE  Completed   • PNEUMOCOCCAL VACCINES (65+ LOW/MEDIUM RISK)  Completed          The following portions of the patient's history were reviewed and updated as appropriate: allergies, current medications, past family history, past medical history, past social history, past surgical history and problem list.    Outpatient Medications Prior to Visit   Medication Sig Dispense Refill   • atorvastatin (LIPITOR) 80 MG tablet Take 80 mg by mouth Daily.     • BIOTIN PO Take  by mouth.     • carvedilol (COREG) 3.125 MG tablet TAKE 1 TABLET BY MOUTH EVERY 12 HOURS 180 tablet 3   • Multiple Vitamin (MULTI VITAMIN DAILY PO) Take  by mouth. Centrum silver     • pantoprazole (PROTONIX) 40 MG EC tablet Take 40 mg by mouth Daily.     • VITAMIN E PO Take  by mouth.     • dexamethasone (DECADRON) 1.5 MG tablet Take 2 tabs TID x 3 days, 1 tab TID x 3 days, 1/2 tab BID x 3 days. 30 tablet 0   • ferrous sulfate 325 (65 FE) MG tablet Take 325 mg by mouth Daily With Breakfast. HOLD PRIOR TO SURGERY     • methylPREDNISolone (MEDROL, BENIGNO,) 4 MG tablet Take as directed on package instructions. 21 each 0     No facility-administered medications prior to visit.        Patient Active Problem List   Diagnosis   • Status post total replacement of left hip   • Lumbar canal stenosis   • Lumbar radiculopathy   • Essential hypertension   • Hyperlipemia   • Elevated fasting glucose   • NSTEMI (non-ST elevated myocardial infarction) (CMS/HCC)   • Iron deficiency anemia   • General weakness   • Lung nodule   • Abnormal CT of the abdomen   • Superficial thrombophlebitis of left upper extremity   • Thrombocytopenia (CMS/HCC)   • Leukopenia   • Coronary artery disease involving native coronary artery of native heart without angina pectoris   • Abnormal CT of the abdomen   • General weakness   • Abnormal CT of the abdomen   • Abnormal CT of the abdomen   •  "General weakness   • Cirrhosis, non-alcoholic (CMS/HCC)   • Arthritis of left hip       Advanced Care Planning:  Patient has an advance directive - a copy has been provided and is visible in patient header    Review of Systems    Compared to one year ago, the patient feels her physical health is the same.  Compared to one year ago, the patient feels her mental health is the same.    Reviewed chart for potential of high risk medication in the elderly: not applicable  Reviewed chart for potential of harmful drug interactions in the elderly:not applicable    Objective         Vitals:    11/07/19 0907   BP: 174/80   Pulse: 76   SpO2: 98%   Weight: 69.4 kg (153 lb)   Height: 160 cm (63\")       Body mass index is 27.1 kg/m².  Discussed the patient's BMI with her. The BMI slightly above average - encouraged prudent diet, regular exercise.    Physical Exam          Assessment/Plan   Medicare Risks and Personalized Health Plan  CMS Preventative Services Quick Reference  Fall Risk  Immunizations Discussed/Encouraged (specific immunizations; adacel Tdap and Shingrix )    The above risks/problems have been discussed with the patient.  Pertinent information has been shared with the patient in the After Visit Summary.  Follow up plans and orders are seen below in the Assessment/Plan Section.    Diagnoses and all orders for this visit:    1. Medicare annual wellness visit, subsequent (Primary)    2. Essential hypertension  -     Lipid Panel; Future  -     Lipid Panel    3. Hyperlipidemia, unspecified hyperlipidemia type    4. Coronary artery disease involving native coronary artery of native heart without angina pectoris    5. Cirrhosis, non-alcoholic (CMS/HCC)    6. Thrombocytopenia (CMS/HCC)    Other orders  -     lisinopril (PRINIVIL,ZESTRIL) 5 MG tablet; Take 1 tablet by mouth Daily.  Dispense: 90 tablet; Refill: 1      Follow Up:  Return in about 4 months (around 3/7/2020) for Follow up, Fasting.     An After Visit Summary " and PPPS were given to the patient.

## 2019-11-07 NOTE — PROGRESS NOTES
Chief Complaint   Patient presents with   • Medicare Wellness-subsequent   • Hypertension   • Hyperlipidemia       Subjective   Pamela Durham is a 84 y.o. female.     Hypertension   This is a chronic problem. The problem is unchanged. Pertinent negatives include no blurred vision, chest pain, headaches, orthopnea, palpitations, peripheral edema, PND or shortness of breath. Current antihypertension treatment includes beta blockers. The current treatment provides moderate improvement. Hypertensive end-organ damage includes CAD/MI.   Hyperlipidemia   This is a chronic problem. The problem is controlled. Recent lipid tests were reviewed and are normal. Pertinent negatives include no chest pain or shortness of breath. Current antihyperlipidemic treatment includes statins, diet change and exercise. The current treatment provides significant improvement of lipids. There are no compliance problems.       Lab Results   Component Value Date    CHOL 119 02/28/2018    CHLPL 100 11/26/2018    TRIG 96 11/26/2018    HDL 40 11/26/2018    LDL 41 11/26/2018     She follows up with cardiology for history of coronary artery disease.  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  Follows up with hematology for thrombocytopenia.  The following portions of the patient's history were reviewed and updated as appropriate: allergies, current medications, past family history, past medical history, past social history, past surgical history and problem list.    Review of Systems   Constitutional: Negative for appetite change.   HENT: Negative for nosebleeds.    Eyes: Negative for blurred vision and double vision.   Respiratory: Negative for cough and shortness of breath.    Cardiovascular: Negative for chest pain, palpitations, orthopnea, leg swelling and PND.         Current Outpatient Medications:   •  atorvastatin (LIPITOR) 80 MG tablet, Take 80 mg by mouth Daily., Disp: , Rfl:   •  BIOTIN PO, Take  by mouth., Disp: , Rfl:  "  •  carvedilol (COREG) 3.125 MG tablet, TAKE 1 TABLET BY MOUTH EVERY 12 HOURS, Disp: 180 tablet, Rfl: 3  •  Multiple Vitamin (MULTI VITAMIN DAILY PO), Take  by mouth. Centrum silver, Disp: , Rfl:   •  pantoprazole (PROTONIX) 40 MG EC tablet, Take 40 mg by mouth Daily., Disp: , Rfl:   •  VITAMIN E PO, Take  by mouth., Disp: , Rfl:   •  lisinopril (PRINIVIL,ZESTRIL) 5 MG tablet, Take 1 tablet by mouth Daily., Disp: 90 tablet, Rfl: 1        Objective     /80   Pulse 76   Ht 160 cm (63\")   Wt 69.4 kg (153 lb)   SpO2 98%   BMI 27.10 kg/m²     Physical Exam   Constitutional: She is oriented to person, place, and time. She appears well-developed and well-nourished. No distress.   Neck: Normal carotid pulses present. Carotid bruit is not present.   Cardiovascular: Regular rhythm, S1 normal and S2 normal. Exam reveals no gallop and no friction rub.   No murmur heard.  Pulses:       Carotid pulses are 2+ on the right side, and 2+ on the left side.  Pulmonary/Chest: Effort normal and breath sounds normal. She has no wheezes. She has no rhonchi. She has no rales. Chest wall is not dull to percussion.   Musculoskeletal: She exhibits no edema.   Neurological: She is alert and oriented to person, place, and time.   Skin: Skin is warm and dry.   Nursing note and vitals reviewed.        Assessment/Plan   Pamela was seen today for medicare wellness-subsequent, hypertension and hyperlipidemia.    Diagnoses and all orders for this visit:    Medicare annual wellness visit, subsequent    Essential hypertension  -     Lipid Panel; Future  -     Lipid Panel    Hyperlipidemia, unspecified hyperlipidemia type    Coronary artery disease involving native coronary artery of native heart without angina pectoris    Cirrhosis, non-alcoholic (CMS/HCC)    Thrombocytopenia (CMS/HCC)    Other orders  -     lisinopril (PRINIVIL,ZESTRIL) 5 MG tablet; Take 1 tablet by mouth Daily.      Encouraged prudent diet, regular exercise, maintenance of " healthy weight, good sleep habits,  avoidance of tobacco products, excessive alcohol.  Systolic pressure elevated today.  Lisinopril added.

## 2019-11-07 NOTE — PATIENT INSTRUCTIONS
Medicare Wellness  Personal Prevention Plan of Service     Date of Office Visit:  2019  Encounter Provider:  Crista Casillas MD  Place of Service:  [facility] INTERNAL MEDICINE  Patient Name: Pamela Durham  :  1935    As part of the Medicare Wellness portion of your visit today, we are providing you with this personalized preventive plan of services (PPPS). This plan is based upon recommendations of the [place] Preventive Services Task Force (USPSTF) and the Advisory Committee on Immunization Practices (ACIP).    This lists the preventive care services that should be considered, and provides dates of when you are due. Items listed as completed are up-to-date and do not require any further intervention.    Health Maintenance   Topic Date Due   • TDAP/TD VACCINES (1 - Tdap) 1955   • ZOSTER VACCINE (2 of 3) 2006   • MEDICARE ANNUAL WELLNESS  10/06/2018   • LIPID PANEL  2019   • MAMMOGRAM  2020   • INFLUENZA VACCINE  Completed   • PNEUMOCOCCAL VACCINES (65+ LOW/MEDIUM RISK)  Completed       Orders Placed This Encounter   Procedures   • Lipid Panel     Standing Status:   Future     Number of Occurrences:   1     Standing Expiration Date:   2020       Return in about 4 months (around 3/7/2020) for Follow up, Fasting.    Check with your pharmacy about the new shingles (zoster) vaccine and a tetanus shot (Tdap).  Ask your pharmacy to send us the dates of your shingles vaccines.

## 2019-11-26 DIAGNOSIS — D69.6 THROMBOCYTOPENIA (HCC): Primary | ICD-10-CM

## 2019-12-05 ENCOUNTER — CLINICAL SUPPORT (OUTPATIENT)
Dept: ONCOLOGY | Facility: HOSPITAL | Age: 84
End: 2019-12-05

## 2019-12-05 ENCOUNTER — LAB (OUTPATIENT)
Dept: OTHER | Facility: HOSPITAL | Age: 84
End: 2019-12-05

## 2019-12-05 VITALS
HEART RATE: 78 BPM | WEIGHT: 149.4 LBS | BODY MASS INDEX: 26.47 KG/M2 | TEMPERATURE: 98.2 F | OXYGEN SATURATION: 94 % | DIASTOLIC BLOOD PRESSURE: 73 MMHG | SYSTOLIC BLOOD PRESSURE: 157 MMHG

## 2019-12-05 DIAGNOSIS — D69.6 THROMBOCYTOPENIA (HCC): ICD-10-CM

## 2019-12-05 LAB
BASOPHILS # BLD AUTO: 0.03 10*3/MM3 (ref 0–0.2)
BASOPHILS NFR BLD AUTO: 0.6 % (ref 0–1.5)
DEPRECATED RDW RBC AUTO: 43.8 FL (ref 37–54)
EOSINOPHIL # BLD AUTO: 0.17 10*3/MM3 (ref 0–0.4)
EOSINOPHIL NFR BLD AUTO: 3.5 % (ref 0.3–6.2)
ERYTHROCYTE [DISTWIDTH] IN BLOOD BY AUTOMATED COUNT: 14 % (ref 12.3–15.4)
HCT VFR BLD AUTO: 43.1 % (ref 34–46.6)
HGB BLD-MCNC: 14.8 G/DL (ref 12–15.9)
IMM GRANULOCYTES # BLD AUTO: 0.02 10*3/MM3 (ref 0–0.05)
IMM GRANULOCYTES NFR BLD AUTO: 0.4 % (ref 0–0.5)
LYMPHOCYTES # BLD AUTO: 1.36 10*3/MM3 (ref 0.7–3.1)
LYMPHOCYTES NFR BLD AUTO: 28.2 % (ref 19.6–45.3)
MCH RBC QN AUTO: 29.2 PG (ref 26.6–33)
MCHC RBC AUTO-ENTMCNC: 34.3 G/DL (ref 31.5–35.7)
MCV RBC AUTO: 85.2 FL (ref 79–97)
MONOCYTES # BLD AUTO: 0.56 10*3/MM3 (ref 0.1–0.9)
MONOCYTES NFR BLD AUTO: 11.6 % (ref 5–12)
NEUTROPHILS # BLD AUTO: 2.69 10*3/MM3 (ref 1.7–7)
NEUTROPHILS NFR BLD AUTO: 55.7 % (ref 42.7–76)
NRBC BLD AUTO-RTO: 0 /100 WBC (ref 0–0.2)
PLATELET # BLD AUTO: 98 10*3/MM3 (ref 140–450)
PMV BLD AUTO: 11.7 FL (ref 6–12)
RBC # BLD AUTO: 5.06 10*6/MM3 (ref 3.77–5.28)
WBC NRBC COR # BLD: 4.83 10*3/MM3 (ref 3.4–10.8)

## 2019-12-05 PROCEDURE — 85025 COMPLETE CBC W/AUTO DIFF WBC: CPT | Performed by: NURSE PRACTITIONER

## 2019-12-05 PROCEDURE — 36415 COLL VENOUS BLD VENIPUNCTURE: CPT

## 2019-12-05 NOTE — NURSING NOTE
Pt is here for lab with RN review.  CBC reviewed with pt, counts are stable for this pt at this time. Pt has no complaints.  Copy of labs given to pt and f/u appt reviewed. Pt is instructed to call the office with any concerns or new symptoms prior to next visit. Pt vu.  Lab Results   Component Value Date    WBC 4.83 12/05/2019    HGB 14.8 12/05/2019    HCT 43.1 12/05/2019    MCV 85.2 12/05/2019    PLT 98 (L) 12/05/2019

## 2019-12-23 RX ORDER — PANTOPRAZOLE SODIUM 40 MG/1
TABLET, DELAYED RELEASE ORAL
Qty: 30 TABLET | Refills: 5 | Status: SHIPPED | OUTPATIENT
Start: 2019-12-23 | End: 2020-11-02

## 2019-12-31 ENCOUNTER — TELEPHONE (OUTPATIENT)
Dept: INTERNAL MEDICINE | Facility: CLINIC | Age: 84
End: 2019-12-31

## 2020-03-19 ENCOUNTER — TELEPHONE (OUTPATIENT)
Dept: CARDIOLOGY | Facility: CLINIC | Age: 85
End: 2020-03-19

## 2020-03-19 NOTE — TELEPHONE ENCOUNTER
S/W pt re: rescheduling due to Coronavirus Precaution. Pt stated her son was going to be calling to get her rescheduled anyway as she doesn't want to come out of her home. Pt stated she wasn't having any symptoms of Angina, SOA, Cough, Fever, Dizziness, or Lightheadedness and no refills needed at this time. Pt stated she feels great. I advised pt to call if any symptoms or need any refills. Pt verbalized appreciation and understanding.     RAVINDER Oreilly

## 2020-03-23 ENCOUNTER — OFFICE VISIT (OUTPATIENT)
Dept: CARDIOLOGY | Facility: CLINIC | Age: 85
End: 2020-03-23

## 2020-03-23 VITALS — BODY MASS INDEX: 25.69 KG/M2 | WEIGHT: 145 LBS | HEIGHT: 63 IN

## 2020-03-23 DIAGNOSIS — I10 ESSENTIAL HYPERTENSION: ICD-10-CM

## 2020-03-23 DIAGNOSIS — I25.10 CORONARY ARTERY DISEASE INVOLVING NATIVE CORONARY ARTERY OF NATIVE HEART WITHOUT ANGINA PECTORIS: Primary | ICD-10-CM

## 2020-03-23 PROCEDURE — 99213 OFFICE O/P EST LOW 20 MIN: CPT | Performed by: INTERNAL MEDICINE

## 2020-03-23 NOTE — PROGRESS NOTES
Date of Office Visit: 20  Encounter Provider: Desean Earl MD  Place of Service: [facility] CARDIOLOGY  Patient Name: Pamela Durham  :1935    Chief Complaint   Patient presents with   • Coronary Artery Disease   Essential hypertension    HPI:  Patient consented to telephone appt. No video    84 year old with a past medical history significant for coronary artery disease.  She was found to have a non-STEMI during a hospitalization.  That same hospitalization she was found to have multiple colonic angiodysplastic lesions that were treated with argon plasma coagulation.  She ultimately underwent cardiac catheterization which showed a  of the circumflex, 40% RCA, and OM1 branch that filled via left to left collaterals, and a 70% LAD lesion.  No intervention was performed secondary to the fact that she was asymptomatic and because she was at high risk of bleeding.  We have managed her medically since.  She was last in our office to see me in 2018 and Amelia last year.    Since the patient's visit with Amelia, she has been doing well. She states that her blood pressure has been better controlled on the lisinopril and atenolol therapy.  She has been active but denies any chest pain or dyspnea on exertion. She has no symptoms consistent with heart failure. She has not been doing much activity over the past few weeks secondary to the weather. She really feels well           Past Medical History:   Diagnosis Date   • Anemia    • Cirrhosis, non-alcoholic (CMS/[facility])    • CKD (chronic kidney disease) stage 3, GFR 30-59 ml/min (CMS/HCC) 2019   • Gallbladder stone without cholecystitis or obstruction     gallstone seen on ultrasound    • GERD (gastroesophageal reflux disease)    • Health care maintenance    • Hyperlipidemia    • Hypertension    • Lumbar canal stenosis    • Lumbar radiculopathy    • MI (myocardial infarction) (CMS/HCC) 2016    NON-STEMI   •  NSTEMI (non-ST elevated myocardial infarction) (CMS/HCC)    • Osteoarthritis    • Thrombophlebitis of superficial veins of upper extremities     LEFT       Past Surgical History:   Procedure Laterality Date   • CARDIAC CATHETERIZATION N/A 3/1/2017    Procedure: Coronary angiography;  Surgeon: Desean Earl MD;  Location: [facility] CATH INVASIVE LOCATION;  Service:    • COLONOSCOPY N/A 12/27/2016    Procedure: COLONOSCOPY INTO CECUM WITH ARGON PLASMA COAGULATION;  Surgeon: Javier Peñaloza MD;  Location: [facility] ENDOSCOPY;  Service:    • ENDOSCOPY N/A 12/27/2016    Procedure: ESOPHAGOGASTRODUODENOSCOPY WITH COLD BIOPSIES;  Surgeon: Javier Peñaloza MD;  Location: [facility] ENDOSCOPY;  Service:    • HIP ARTHROPLASTY Right 04/01/2015   • HIP CLOSED REDUCTION Left 1/25/2019    Procedure: LT. HIP CLOSED REDUCTION;  Surgeon: Domenico Peñaloza MD;  Location: [facility] OR;  Service: Orthopedics   • HYSTERECTOMY  1986   • JOINT REPLACEMENT     • KNEE SURGERY Bilateral     2007 & 2008   • LUNG SURGERY  1965   • TONSILLECTOMY     • TOTAL HIP ARTHROPLASTY Left 1/23/2019    Procedure: LEFT TOTAL HIP ARTHROPLASTY MARICRUZ NAVIGATION;  Surgeon: Domenico Peñaloza MD;  Location: [facility] OR;  Service: Orthopedics       Social History     Socioeconomic History   • Marital status:      Spouse name: Not on file   • Number of children: Not on file   • Years of education: College   • Highest education level: Not on file   Occupational History   • Occupation: Secretarial     Employer: RETIRED   Tobacco Use   • Smoking status: Never Smoker   • Smokeless tobacco: Never Used   Substance and Sexual Activity   • Alcohol use: No   • Drug use: No   • Sexual activity: Defer       Family History   Problem Relation Age of Onset   • Hypertension Other    • Heart disease Other    • Liver cancer Brother    • Ovarian cancer Daughter         diagnosed 2012   • No Known Problems Mother    • Heart disease Father    • Heart attack Father    •  Hypertension Father    • Cancer Maternal Grandmother    • No Known Problems Maternal Grandfather    • No Known Problems Paternal Grandmother    • No Known Problems Paternal Grandfather    • Malig Hyperthermia Neg Hx        Review of Systems   Constitution: Negative for chills, fever and malaise/fatigue.   Cardiovascular: Negative for chest pain, dyspnea on exertion, leg swelling, near-syncope, orthopnea, palpitations, paroxysmal nocturnal dyspnea and syncope.   Respiratory: Negative for cough and shortness of breath.    Musculoskeletal: Negative for joint pain, joint swelling and myalgias.   Gastrointestinal: Negative for abdominal pain, diarrhea, melena, nausea and vomiting.   Genitourinary: Negative for frequency and hematuria.   Neurological: Negative for light-headedness, numbness, paresthesias and seizures.   Allergic/Immunologic: Negative.    All other systems reviewed and are negative.      No Known Allergies      Current Outpatient Medications:   •  atorvastatin (LIPITOR) 80 MG tablet, Take 80 mg by mouth Daily., Disp: , Rfl:   •  BIOTIN PO, Take  by mouth Daily., Disp: , Rfl:   •  Calcium Carbonate-Vit D-Min (CALCIUM 1200 PO), Take  by mouth 2 (Two) Times a Day., Disp: , Rfl:   •  carvedilol (COREG) 3.125 MG tablet, TAKE 1 TABLET BY MOUTH EVERY 12 HOURS, Disp: 180 tablet, Rfl: 3  •  Cholecalciferol (VITAMIN D3) 125 MCG (5000 UT) capsule capsule, Take 5,000 Units by mouth Daily., Disp: , Rfl:   •  docusate sodium (COLACE) 50 MG capsule, Take  by mouth 2 (Two) Times a Day As Needed for Constipation., Disp: , Rfl:   •  lisinopril (PRINIVIL,ZESTRIL) 5 MG tablet, Take 1 tablet by mouth Daily., Disp: 90 tablet, Rfl: 1  •  Multiple Vitamin (MULTI VITAMIN DAILY PO), Take  by mouth Daily. Centrum silver , Disp: , Rfl:   •  pantoprazole (PROTONIX) 40 MG EC tablet, TAKE 1 TABLET BY MOUTH DAILY, Disp: 30 tablet, Rfl: 5  •  VITAMIN E PO, Take  by mouth Daily., Disp: , Rfl:       Objective:     There were no vitals filed  for this visit.  There is no height or weight on file to calculate BMI.    PHYSICAL EXAM:  No exam   Telehealth visit    Procedures      Assessment:      No diagnosis found.  No orders of the defined types were placed in this encounter.         Plan:    84 year old female with a medical history of CAD, chronic anemia, and essential hypertension. Coronary disease was medically managed secondary to anemia due to angiodysplasia, documented at the time. She is doing well and has no angina at this time.    1.  CAD:  LCX along with severely calcified LAD stenosis       -No angina at this time       -Continue ASA and atorvastatin       -No additional ischemic w/u indicated at this time    2.  Essential hypertension       -No changes.     Feels well. See her back in 6 mo    15 min on telephone conference/appt

## 2020-07-07 RX ORDER — ATORVASTATIN CALCIUM 80 MG/1
TABLET, FILM COATED ORAL
Qty: 90 TABLET | Refills: 0 | Status: SHIPPED | OUTPATIENT
Start: 2020-07-07 | End: 2020-12-21

## 2020-08-24 RX ORDER — LISINOPRIL 5 MG/1
5 TABLET ORAL DAILY
Qty: 90 TABLET | Refills: 1 | Status: SHIPPED | OUTPATIENT
Start: 2020-08-24 | End: 2021-02-26

## 2020-11-02 RX ORDER — PANTOPRAZOLE SODIUM 40 MG/1
TABLET, DELAYED RELEASE ORAL
Qty: 30 TABLET | Refills: 5 | Status: SHIPPED | OUTPATIENT
Start: 2020-11-02

## 2020-12-21 DIAGNOSIS — I25.10 CORONARY ARTERY DISEASE INVOLVING NATIVE CORONARY ARTERY OF NATIVE HEART WITHOUT ANGINA PECTORIS: Primary | ICD-10-CM

## 2020-12-21 RX ORDER — ATORVASTATIN CALCIUM 80 MG/1
TABLET, FILM COATED ORAL
Qty: 90 TABLET | Refills: 0 | Status: SHIPPED | OUTPATIENT
Start: 2020-12-21 | End: 2021-02-25

## 2021-01-20 ENCOUNTER — TELEPHONE (OUTPATIENT)
Dept: ONCOLOGY | Facility: CLINIC | Age: 86
End: 2021-01-20

## 2021-01-20 NOTE — TELEPHONE ENCOUNTER
Caller: Jesus    Relationship to patient: son    Best call back number: [phone]    Additional notes: Pt needs labs done for Dr Ealr. Pt would like to schedule a lab appt to have these done at your office before Jan 28th.    Can pt schedule to have these done at your office?    Dr Earl (- [phone]) office will place orders in Epic.    Please give Jesus a call.

## 2021-01-21 ENCOUNTER — TELEPHONE (OUTPATIENT)
Dept: ONCOLOGY | Facility: CLINIC | Age: 86
End: 2021-01-21

## 2021-01-21 NOTE — TELEPHONE ENCOUNTER
Call to patient's son, Jesus, to let him know as Dr. Gutiérrez has not been following the patient and the patient has not been to this office since, June 2019, the labs would need to be done at Dr. Earl's office.  Unable to reach patient, so message left on phone with above details.

## 2021-01-25 ENCOUNTER — TELEPHONE (OUTPATIENT)
Dept: ONCOLOGY | Facility: CLINIC | Age: 86
End: 2021-01-25

## 2021-01-25 NOTE — TELEPHONE ENCOUNTER
Gregg, pt's son, calling.    Patient has consultation with Dr. Earl soon (1/28) Dr. Earl has ordered labs and said she should be able to get these done at [facility] office.  The orders are in Epic.    Can patient come to [facility] office to get these labs?  This would be more convenient for the patient.      [phone]

## 2021-01-25 NOTE — TELEPHONE ENCOUNTER
Call to Mr. Durham, patient's son, to let him know as patient is not being followed in the office, we do not bring someone here for lab draws.  Instructed him to take patient to [facility] outpatient lab for lab draw.  Patient's son stated they wanted to stay away from the hospital re: covid. Explained to Mr. Durham that proper protocol would be followed there as here in the office.  We discussed that possibly the office ordering the labs could call [facility] and possibly get labs done in patient's home.  Understanding verbalized by patient's son.

## 2021-01-25 NOTE — TELEPHONE ENCOUNTER
Returning call to pt's son. Pt's son would like pt to have labs done in our office for a referral for Dr. Earl. Skylar WEI already spoke to pt's son last week and let him know that since Dr. Gutiérrez is no longer following the pt and the pt hasn't been seen since June of 2019 the pt cannot have labs drawn here. Reiterated that message to the pt's son, but he was insistent that the pt have her labs drawn here. Told pt the lab is not available to anyone but current pts multiple times. Pt's son wanted to speak to Dr Gutiérrez's nurse to see if he can set up an appt for her to see the pt so she can have her labs drawn. Message sent to Skylar WEI.

## 2021-02-01 ENCOUNTER — TELEMEDICINE (OUTPATIENT)
Dept: CARDIOLOGY | Facility: CLINIC | Age: 86
End: 2021-02-01

## 2021-02-01 VITALS
HEART RATE: 65 BPM | DIASTOLIC BLOOD PRESSURE: 65 MMHG | BODY MASS INDEX: 26.05 KG/M2 | HEIGHT: 63 IN | SYSTOLIC BLOOD PRESSURE: 147 MMHG | WEIGHT: 147 LBS

## 2021-02-01 DIAGNOSIS — I10 ESSENTIAL HYPERTENSION: ICD-10-CM

## 2021-02-01 DIAGNOSIS — I25.10 CORONARY ARTERY DISEASE INVOLVING NATIVE CORONARY ARTERY OF NATIVE HEART WITHOUT ANGINA PECTORIS: Primary | ICD-10-CM

## 2021-02-01 PROCEDURE — 99442 PR PHYS/QHP TELEPHONE EVALUATION 11-20 MIN: CPT | Performed by: INTERNAL MEDICINE

## 2021-02-01 NOTE — PROGRESS NOTES
Date of Office Visit: 21  Encounter Provider: Desean Earl MD  Place of Service: [facility] CARDIOLOGY  Patient Name: Pamela Durham  :1935    Chief Complaint   Patient presents with   • Follow-up     6 month    Essential hypertension    HPI:  This patient has consented to a telehealth visit via phone. The visit was scheduled as a phone visit to comply with patient safety concerns in accordance with CDC recommendations.  All vitals recorded within this visit are reported by the patient.  I spent 25 minutes in total including but not limited to the 15 minutes spent in direct conversation with this patient.     85 year old with a past medical history significant for coronary artery disease.  She was found to have a non-STEMI during a hospitalization.  That same hospitalization she was found to have multiple colonic angiodysplastic lesions that were treated with argon plasma coagulation.  She ultimately underwent cardiac catheterization which showed a  of the circumflex, 40% RCA, and OM1 branch that filled via left to left collaterals, and a 70% LAD lesion.  No intervention was performed secondary to the fact that she was asymptomatic and because she was at high risk of bleeding.  We have managed her medically since.  She was last in our office to see me in 2018 and Amelia last year.    Since our last visit, she has been doing very well.  She denies any chest pain or dyspnea on exertion.  She has no orthopnea or PND.  She did have an elevated blood pressure on her initial check today, however evaluation the blood pressure is actually normal.       Past Medical History:   Diagnosis Date   • Anemia    • Cirrhosis, non-alcoholic (CMS/HCC)    • CKD (chronic kidney disease) stage 3, GFR 30-59 ml/min (CMS/HCC) 2019   • Gallbladder stone without cholecystitis or obstruction     gallstone seen on ultrasound    • GERD (gastroesophageal reflux disease)    •  Health care maintenance    • Hyperlipidemia    • Hypertension    • Lumbar canal stenosis    • Lumbar radiculopathy    • MI (myocardial infarction) (CMS/[facility]) 12/25/2016    NON-STEMI   • NSTEMI (non-ST elevated myocardial infarction) (CMS/[facility])    • Osteoarthritis    • Thrombophlebitis of superficial veins of upper extremities     LEFT       Past Surgical History:   Procedure Laterality Date   • CARDIAC CATHETERIZATION N/A 3/1/2017    Procedure: Coronary angiography;  Surgeon: Desean Earl MD;  Location: [facility] CATH INVASIVE LOCATION;  Service:    • COLONOSCOPY N/A 12/27/2016    Procedure: COLONOSCOPY INTO CECUM WITH ARGON PLASMA COAGULATION;  Surgeon: Javier Peñaloza MD;  Location: [facility] ENDOSCOPY;  Service:    • ENDOSCOPY N/A 12/27/2016    Procedure: ESOPHAGOGASTRODUODENOSCOPY WITH COLD BIOPSIES;  Surgeon: Javier Peñaloza MD;  Location: [facility] ENDOSCOPY;  Service:    • HIP ARTHROPLASTY Right 04/01/2015   • HIP CLOSED REDUCTION Left 1/25/2019    Procedure: LT. HIP CLOSED REDUCTION;  Surgeon: Domenico Peñaloza MD;  Location: [facility] OR;  Service: Orthopedics   • HYSTERECTOMY  1986   • JOINT REPLACEMENT     • KNEE SURGERY Bilateral     2007 & 2008   • LUNG SURGERY  1965   • TONSILLECTOMY     • TOTAL HIP ARTHROPLASTY Left 1/23/2019    Procedure: LEFT TOTAL HIP ARTHROPLASTY MARICRUZ NAVIGATION;  Surgeon: Domenico Peñaloza MD;  Location: [facility];  Service: Orthopedics       Social History     Socioeconomic History   • Marital status:      Spouse name: Not on file   • Number of children: Not on file   • Years of education: College   • Highest education level: Not on file   Occupational History   • Occupation: Secretarial     Employer: RETIRED   Tobacco Use   • Smoking status: Never Smoker   • Smokeless tobacco: Never Used   • Tobacco comment: occ caffeine use   Substance and Sexual Activity   • Alcohol use: No   • Drug use: No   • Sexual activity: Defer       Family History   Problem Relation Age of  Onset   • Hypertension Other    • Heart disease Other    • Liver cancer Brother    • Ovarian cancer Daughter         diagnosed 2012   • No Known Problems Mother    • Heart disease Father    • Heart attack Father    • Hypertension Father    • Cancer Maternal Grandmother    • No Known Problems Maternal Grandfather    • No Known Problems Paternal Grandmother    • No Known Problems Paternal Grandfather    • Malig Hyperthermia Neg Hx        Review of Systems   Constitution: Negative for chills, fever and malaise/fatigue.   Cardiovascular: Negative for chest pain, dyspnea on exertion, leg swelling, near-syncope, orthopnea, palpitations, paroxysmal nocturnal dyspnea and syncope.   Respiratory: Negative for cough and shortness of breath.    Musculoskeletal: Negative for joint pain, joint swelling and myalgias.   Gastrointestinal: Negative for abdominal pain, diarrhea, melena, nausea and vomiting.   Genitourinary: Negative for frequency and hematuria.   Neurological: Negative for light-headedness, numbness, paresthesias and seizures.   Allergic/Immunologic: Negative.    All other systems reviewed and are negative.      No Known Allergies      Current Outpatient Medications:   •  atorvastatin (LIPITOR) 80 MG tablet, TAKE 1 TABLET BY MOUTH EVERY NIGHT, Disp: 90 tablet, Rfl: 0  •  BIOTIN PO, Take  by mouth Daily., Disp: , Rfl:   •  Calcium Carbonate-Vit D-Min (CALCIUM 1200 PO), Take  by mouth 2 (Two) Times a Day., Disp: , Rfl:   •  carvedilol (COREG) 3.125 MG tablet, TAKE 1 TABLET BY MOUTH EVERY 12 HOURS, Disp: 180 tablet, Rfl: 3  •  Cholecalciferol (VITAMIN D3) 125 MCG (5000 UT) capsule capsule, Take 5,000 Units by mouth Daily., Disp: , Rfl:   •  lisinopril (PRINIVIL,ZESTRIL) 5 MG tablet, TAKE 1 TABLET BY MOUTH DAILY, Disp: 90 tablet, Rfl: 1  •  Multiple Vitamin (MULTI VITAMIN DAILY PO), Take  by mouth Daily. Centrum silver , Disp: , Rfl:   •  VITAMIN E PO, Take  by mouth Daily., Disp: , Rfl:   •  pantoprazole (PROTONIX) 40 MG  "EC tablet, TAKE 1 TABLET BY MOUTH DAILY, Disp: 30 tablet, Rfl: 5      Objective:     Vitals:    02/01/21 1320   BP: 147/65   Pulse: 65   Weight: 66.7 kg (147 lb)   Height: 160 cm (63\")     Body mass index is 26.04 kg/m².    PHYSICAL EXAM:  No exam   Telehealth visit    Procedures      Assessment:      No diagnosis found.  No orders of the defined types were placed in this encounter.       Plan:   85 year old female with a medical history of CAD, chronic anemia, and essential hypertension. Coronary disease was medically managed secondary to anemia due to angiodysplasia, documented at the time. She is doing well and has no angina at this time.  She has no new complaints currently.    1.  CAD:  LCX along with severely calcified LAD stenosis       -No angina at this time       -Continue ASA and atorvastatin.  Continue carvedilol therapy.  No alteration       -No additional ischemic w/u indicated at this time    2.  Essential hypertension: Initial measurement is elevated, however repeat is normal.       -No changes in current regimen.  Continue lisinopril.  No issues with hyperkalemia or            elevated creatinine    3.  Hyperlipidemia: Lipid panel has been reviewed and has been well controlled.  No issues with myalgias  "

## 2021-02-25 RX ORDER — ATORVASTATIN CALCIUM 80 MG/1
80 TABLET, FILM COATED ORAL NIGHTLY
Qty: 30 TABLET | Refills: 0 | Status: SHIPPED | OUTPATIENT
Start: 2021-02-25 | End: 2021-04-23

## 2021-02-25 RX ORDER — LISINOPRIL 5 MG/1
5 TABLET ORAL DAILY
Qty: 90 TABLET | Refills: 1 | OUTPATIENT
Start: 2021-02-25

## 2021-02-26 RX ORDER — LISINOPRIL 5 MG/1
5 TABLET ORAL DAILY
Qty: 90 TABLET | Refills: 1 | Status: SHIPPED | OUTPATIENT
Start: 2021-02-26 | End: 2021-07-18

## 2021-03-23 RX ORDER — CARVEDILOL 3.12 MG/1
TABLET ORAL
Qty: 180 TABLET | Refills: 3 | Status: SHIPPED | OUTPATIENT
Start: 2021-03-23 | End: 2022-05-06

## 2021-04-23 RX ORDER — ATORVASTATIN CALCIUM 80 MG/1
80 TABLET, FILM COATED ORAL NIGHTLY
Qty: 30 TABLET | Refills: 0 | Status: SHIPPED | OUTPATIENT
Start: 2021-04-23 | End: 2021-06-01

## 2021-04-23 NOTE — TELEPHONE ENCOUNTER
Pt requesting a refill on Atorvastatin   LOV with Dr Earl via Telemedicine 2/1/21  Next appt with Dr Earl 5/12/21  Last CBC 12/5/19  Labs ordered 12/21/21 but does not appear that they were drawn

## 2021-05-06 ENCOUNTER — LAB (OUTPATIENT)
Dept: LAB | Facility: HOSPITAL | Age: 86
End: 2021-05-06

## 2021-05-06 DIAGNOSIS — I25.10 CORONARY ARTERY DISEASE INVOLVING NATIVE CORONARY ARTERY OF NATIVE HEART WITHOUT ANGINA PECTORIS: ICD-10-CM

## 2021-05-06 LAB
ALBUMIN SERPL-MCNC: 3.8 G/DL (ref 3.5–5.2)
ALBUMIN/GLOB SERPL: 1.5 G/DL
ALP SERPL-CCNC: 90 U/L (ref 39–117)
ALT SERPL W P-5'-P-CCNC: 23 U/L (ref 1–33)
ANION GAP SERPL CALCULATED.3IONS-SCNC: 8.7 MMOL/L (ref 5–15)
AST SERPL-CCNC: 39 U/L (ref 1–32)
BILIRUB SERPL-MCNC: 1 MG/DL (ref 0–1.2)
BUN SERPL-MCNC: 11 MG/DL (ref 8–23)
BUN/CREAT SERPL: 12.6 (ref 7–25)
CALCIUM SPEC-SCNC: 9.6 MG/DL (ref 8.6–10.5)
CHLORIDE SERPL-SCNC: 106 MMOL/L (ref 98–107)
CHOLEST SERPL-MCNC: 112 MG/DL (ref 0–200)
CO2 SERPL-SCNC: 27.3 MMOL/L (ref 22–29)
CREAT SERPL-MCNC: 0.87 MG/DL (ref 0.57–1)
GFR SERPL CREATININE-BSD FRML MDRD: 62 ML/MIN/1.73
GLOBULIN UR ELPH-MCNC: 2.6 GM/DL
GLUCOSE SERPL-MCNC: 97 MG/DL (ref 65–99)
HDLC SERPL-MCNC: 43 MG/DL (ref 40–60)
LDLC SERPL CALC-MCNC: 50 MG/DL (ref 0–100)
LDLC/HDLC SERPL: 1.14 {RATIO}
POTASSIUM SERPL-SCNC: 4.5 MMOL/L (ref 3.5–5.2)
PROT SERPL-MCNC: 6.4 G/DL (ref 6–8.5)
SODIUM SERPL-SCNC: 142 MMOL/L (ref 136–145)
TRIGL SERPL-MCNC: 99 MG/DL (ref 0–150)
VLDLC SERPL-MCNC: 19 MG/DL (ref 5–40)

## 2021-05-06 PROCEDURE — 80053 COMPREHEN METABOLIC PANEL: CPT

## 2021-05-06 PROCEDURE — 36415 COLL VENOUS BLD VENIPUNCTURE: CPT

## 2021-05-06 PROCEDURE — 80061 LIPID PANEL: CPT

## 2021-05-12 ENCOUNTER — OFFICE VISIT (OUTPATIENT)
Dept: CARDIOLOGY | Facility: CLINIC | Age: 86
End: 2021-05-12

## 2021-05-12 VITALS
BODY MASS INDEX: 27.46 KG/M2 | HEART RATE: 70 BPM | DIASTOLIC BLOOD PRESSURE: 72 MMHG | SYSTOLIC BLOOD PRESSURE: 144 MMHG | WEIGHT: 155 LBS | HEIGHT: 63 IN

## 2021-05-12 DIAGNOSIS — I10 ESSENTIAL HYPERTENSION: ICD-10-CM

## 2021-05-12 DIAGNOSIS — I25.10 CORONARY ARTERY DISEASE INVOLVING NATIVE CORONARY ARTERY OF NATIVE HEART WITHOUT ANGINA PECTORIS: Primary | ICD-10-CM

## 2021-05-12 PROCEDURE — 93000 ELECTROCARDIOGRAM COMPLETE: CPT | Performed by: INTERNAL MEDICINE

## 2021-05-12 PROCEDURE — 99214 OFFICE O/P EST MOD 30 MIN: CPT | Performed by: INTERNAL MEDICINE

## 2021-05-12 NOTE — PROGRESS NOTES
Date of Office Visit: 21  Encounter Provider: Desean Earl MD  Place of Service: [facility] CARDIOLOGY  Patient Name: Pamela Durham  :1935    Chief Complaint   Patient presents with   • Follow-up     3 month   • Coronary Artery Disease   Essential hypertension    HPI:  85 year old with a past medical history significant for coronary artery disease.  She was found to have a non-STEMI during a hospitalization.  That same hospitalization she was found to have multiple colonic angiodysplastic lesions that were treated with argon plasma coagulation.  She ultimately underwent cardiac catheterization which showed a  of the circumflex, 40% RCA, and OM1 branch that filled via left to left collaterals, and a 70% LAD lesion.  No intervention was performed secondary to the fact that she was asymptomatic and because she was at high risk of bleeding.  We have managed her medically since.      Since our last visit she has been doing well.  She denies any chest pain or dyspnea on exertion.   No orthopnea or PND.  She is tolerating this current medical regimen without any difficulties.        Past Medical History:   Diagnosis Date   • Anemia    • Cirrhosis, non-alcoholic (CMS/[facility])    • CKD (chronic kidney disease) stage 3, GFR 30-59 ml/min (CMS/[facility]) 2019   • Gallbladder stone without cholecystitis or obstruction     gallstone seen on ultrasound    • GERD (gastroesophageal reflux disease)    • Health care maintenance    • Hyperlipidemia    • Hypertension    • Lumbar canal stenosis    • Lumbar radiculopathy    • MI (myocardial infarction) (CMS/HCC) 2016    NON-STEMI   • NSTEMI (non-ST elevated myocardial infarction) (CMS/[facility])    • Osteoarthritis    • Thrombophlebitis of superficial veins of upper extremities     LEFT       Past Surgical History:   Procedure Laterality Date   • CARDIAC CATHETERIZATION N/A 3/1/2017    Procedure: Coronary angiography;  Surgeon:  Desean Earl MD;  Location: [facility] CATH INVASIVE LOCATION;  Service:    • COLONOSCOPY N/A 12/27/2016    Procedure: COLONOSCOPY INTO CECUM WITH ARGON PLASMA COAGULATION;  Surgeon: Javier Peñaloza MD;  Location: [facility] ENDOSCOPY;  Service:    • ENDOSCOPY N/A 12/27/2016    Procedure: ESOPHAGOGASTRODUODENOSCOPY WITH COLD BIOPSIES;  Surgeon: Javier Peñaloza MD;  Location: [facility] ENDOSCOPY;  Service:    • HIP ARTHROPLASTY Right 04/01/2015   • HIP CLOSED REDUCTION Left 1/25/2019    Procedure: LT. HIP CLOSED REDUCTION;  Surgeon: Domenico Peñaloza MD;  Location: [facility] OR;  Service: Orthopedics   • HYSTERECTOMY  1986   • JOINT REPLACEMENT     • KNEE SURGERY Bilateral     2007 & 2008   • LUNG SURGERY  1965   • TONSILLECTOMY     • TOTAL HIP ARTHROPLASTY Left 1/23/2019    Procedure: LEFT TOTAL HIP ARTHROPLASTY MARICRUZ NAVIGATION;  Surgeon: Domenico Peñaloza MD;  Location: [facility] OR;  Service: Orthopedics       Social History     Socioeconomic History   • Marital status:      Spouse name: Not on file   • Number of children: Not on file   • Years of education: College   • Highest education level: Not on file   Tobacco Use   • Smoking status: Never Smoker   • Smokeless tobacco: Never Used   • Tobacco comment: occ caffeine use   Substance and Sexual Activity   • Alcohol use: No   • Drug use: No   • Sexual activity: Defer       Family History   Problem Relation Age of Onset   • Hypertension Other    • Heart disease Other    • Liver cancer Brother    • Ovarian cancer Daughter         diagnosed 2012   • No Known Problems Mother    • Heart disease Father    • Heart attack Father    • Hypertension Father    • Cancer Maternal Grandmother    • No Known Problems Maternal Grandfather    • No Known Problems Paternal Grandmother    • No Known Problems Paternal Grandfather    • Malig Hyperthermia Neg Hx        Review of Systems   Constitutional: Negative for chills, fever and malaise/fatigue.   HENT: Negative for  nosebleeds and sore throat.    Eyes: Negative for blurred vision and double vision.   Cardiovascular: Negative for chest pain, claudication, dyspnea on exertion, leg swelling, near-syncope, orthopnea, palpitations, paroxysmal nocturnal dyspnea and syncope.   Respiratory: Negative for cough, shortness of breath and snoring.    Endocrine: Negative for cold intolerance, heat intolerance and polydipsia.   Skin: Negative for itching, poor wound healing and rash.   Musculoskeletal: Negative for joint pain, joint swelling, muscle weakness and myalgias.   Gastrointestinal: Negative for abdominal pain, diarrhea, melena, nausea and vomiting.   Genitourinary: Negative for frequency and hematuria.   Neurological: Negative for light-headedness, loss of balance, numbness, paresthesias, seizures, vertigo and weakness.   Psychiatric/Behavioral: Negative for altered mental status and depression.   Allergic/Immunologic: Negative.    All other systems reviewed and are negative.      No Known Allergies      Current Outpatient Medications:   •  atorvastatin (LIPITOR) 80 MG tablet, TAKE 1 TABLET BY MOUTH EVERY NIGHT, Disp: 30 tablet, Rfl: 0  •  BIOTIN PO, Take  by mouth Daily., Disp: , Rfl:   •  Calcium Carbonate-Vit D-Min (CALCIUM 1200 PO), Take  by mouth 2 (Two) Times a Day., Disp: , Rfl:   •  carvedilol (COREG) 3.125 MG tablet, TAKE 1 TABLET BY MOUTH EVERY 12 HOURS, Disp: 180 tablet, Rfl: 3  •  Cholecalciferol (VITAMIN D3) 125 MCG (5000 UT) capsule capsule, Take 5,000 Units by mouth Daily., Disp: , Rfl:   •  lisinopril (PRINIVIL,ZESTRIL) 5 MG tablet, TAKE 1 TABLET BY MOUTH DAILY, Disp: 90 tablet, Rfl: 1  •  Multiple Vitamin (MULTI VITAMIN DAILY PO), Take  by mouth Daily. Centrum silver , Disp: , Rfl:   •  pantoprazole (PROTONIX) 40 MG EC tablet, TAKE 1 TABLET BY MOUTH DAILY, Disp: 30 tablet, Rfl: 5  •  VITAMIN E PO, Take  by mouth Daily., Disp: , Rfl:       Objective:     Vitals:    05/12/21 1216   BP: 144/72   Pulse: 70   Weight:  "70.3 kg (155 lb)   Height: 160 cm (63\")     Body mass index is 27.46 kg/m².    PHYSICAL EXAM:  Constitutional:       Appearance: Well-developed.   Eyes:      General: No scleral icterus.     Conjunctiva/sclera: Conjunctivae normal.   HENT:      Head: Normocephalic and atraumatic.   Neck:      Thyroid: No thyromegaly.      Vascular: Normal carotid pulses. No carotid bruit, hepatojugular reflux or JVD.      Trachea: No tracheal deviation.   Pulmonary:      Effort: No respiratory distress.      Breath sounds: Normal breath sounds. No decreased breath sounds. No wheezing. No rhonchi. No rales.   Chest:      Chest wall: Not tender to palpatation.   Cardiovascular:      Normal rate. Regular rhythm.      No gallop.   Pulses:     Carotid: 2+ bilaterally.     Radial: 2+ bilaterally.     Femoral: 2+ bilaterally.     Dorsalis pedis: 2+ bilaterally.     Posterior tibial: 2+ bilaterally.  Edema:     Peripheral edema absent.   Abdominal:      General: Bowel sounds are normal. There is no distension.      Palpations: Abdomen is soft.      Tenderness: There is no abdominal tenderness.   Musculoskeletal:         General: No deformity.      Cervical back: Normal range of motion and neck supple. Skin:     Findings: No erythema or rash.   Neurological:      Mental Status: Alert and oriented to person, place, and time.      Sensory: No sensory deficit.   Psychiatric:         Behavior: Behavior normal.           ECG 12 Lead    Date/Time: 5/12/2021 12:32 PM  Performed by: Desean Earl MD  Authorized by: Desean Earl MD   Comparison: compared with previous ECG from 3/22/2019  Similar to previous ECG  Rhythm: sinus rhythm  Rate: normal  Other findings: non-specific ST-T wave changes    Clinical impression: non-specific ECG              Assessment:      No diagnosis found.  No orders of the defined types were placed in this encounter.       Plan:   85 year old female with a medical history of CAD, chronic anemia, and " essential hypertension. Coronary disease was medically managed secondary to anemia due to angiodysplasia, documented at the time. She is doing well and has no angina at this time.  She has no new complaints currently.    1.  CAD:  LCX along with severely calcified LAD stenosis       -No angina at this time       -Continue ASA and atorvastatin.  Continue carvedilol therapy.  No significant resting bradycardia on carvedilol therapy.       -No additional ischemic w/u indicated at this time    2.  Essential hypertension: Acceptable with advanced age       -No changes in current regimen.  Continue lisinopril.  No issues with hyperkalemia or elevated creatinine    3.  Hyperlipidemia: Lipid panel just done this month with LDL of 50.  Transaminases are fine.  Continue current regimen.

## 2021-06-01 RX ORDER — ATORVASTATIN CALCIUM 80 MG/1
80 TABLET, FILM COATED ORAL NIGHTLY
Qty: 90 TABLET | Refills: 3 | Status: SHIPPED | OUTPATIENT
Start: 2021-06-01 | End: 2022-03-07

## 2021-07-18 RX ORDER — LISINOPRIL 5 MG/1
5 TABLET ORAL DAILY
Qty: 90 TABLET | Refills: 1 | Status: SHIPPED | OUTPATIENT
Start: 2021-07-18 | End: 2022-05-31 | Stop reason: SDUPTHER

## 2021-10-28 ENCOUNTER — TELEPHONE (OUTPATIENT)
Dept: INTERNAL MEDICINE | Facility: CLINIC | Age: 86
End: 2021-10-28

## 2021-10-28 NOTE — TELEPHONE ENCOUNTER
Caller: Pamela Durham    Relationship: Self    Best call back number: [phone]    What form or medical record are you requesting: DR. WOLFE'S LETTER OF USP    Who is requesting this form or medical record from you: PATIENT    How would you like to receive the form or medical records (pick-up, mail, fax): MAIL    If mail, what is the address: [id number] [id number]      Timeframe paperwork needed: IN A TIMELY MANNER

## 2021-10-28 NOTE — TELEPHONE ENCOUNTER
Caller: Pamela Durham    Relationship to patient: Self    Best call back number: [phone]    Patient is needing: PLEASE MAIL THE LETTER ABOUT DR AIDEN ROSE.   PATIENT HAS MIS-PLACED HERS AND WOULD LIKE TO SEE THE LIST OF PROVIDERS THAT ARE AVAILABLE.    MAILING ADDRESS:  [id number] [address]

## 2022-03-07 RX ORDER — ATORVASTATIN CALCIUM 80 MG/1
80 TABLET, FILM COATED ORAL NIGHTLY
Qty: 90 TABLET | Refills: 3 | Status: SHIPPED | OUTPATIENT
Start: 2022-03-07

## 2022-05-06 RX ORDER — CARVEDILOL 3.12 MG/1
TABLET ORAL
Qty: 180 TABLET | Refills: 3 | Status: SHIPPED | OUTPATIENT
Start: 2022-05-06

## 2022-05-16 ENCOUNTER — OFFICE VISIT (OUTPATIENT)
Dept: CARDIOLOGY | Facility: CLINIC | Age: 87
End: 2022-05-16

## 2022-05-16 ENCOUNTER — LAB (OUTPATIENT)
Dept: LAB | Facility: HOSPITAL | Age: 87
End: 2022-05-16

## 2022-05-16 VITALS
HEIGHT: 63 IN | BODY MASS INDEX: 26.93 KG/M2 | HEART RATE: 70 BPM | SYSTOLIC BLOOD PRESSURE: 146 MMHG | WEIGHT: 152 LBS | DIASTOLIC BLOOD PRESSURE: 82 MMHG

## 2022-05-16 DIAGNOSIS — Z00.00 HEALTHCARE MAINTENANCE: ICD-10-CM

## 2022-05-16 DIAGNOSIS — I10 ESSENTIAL HYPERTENSION: ICD-10-CM

## 2022-05-16 DIAGNOSIS — R73.9 HYPERGLYCEMIA: ICD-10-CM

## 2022-05-16 DIAGNOSIS — I25.10 CORONARY ARTERY DISEASE INVOLVING NATIVE CORONARY ARTERY OF NATIVE HEART WITHOUT ANGINA PECTORIS: ICD-10-CM

## 2022-05-16 DIAGNOSIS — I25.10 CORONARY ARTERY DISEASE INVOLVING NATIVE CORONARY ARTERY OF NATIVE HEART WITHOUT ANGINA PECTORIS: Primary | ICD-10-CM

## 2022-05-16 LAB
ALBUMIN SERPL-MCNC: 3.9 G/DL (ref 3.5–5.2)
ALBUMIN/GLOB SERPL: 1.7 G/DL
ALP SERPL-CCNC: 132 U/L (ref 39–117)
ALT SERPL W P-5'-P-CCNC: 44 U/L (ref 1–33)
ANION GAP SERPL CALCULATED.3IONS-SCNC: 10 MMOL/L (ref 5–15)
AST SERPL-CCNC: 61 U/L (ref 1–32)
BASOPHILS # BLD AUTO: 0.03 10*3/MM3 (ref 0–0.2)
BASOPHILS NFR BLD AUTO: 1 % (ref 0–1.5)
BILIRUB SERPL-MCNC: 1 MG/DL (ref 0–1.2)
BUN SERPL-MCNC: 10 MG/DL (ref 8–23)
BUN/CREAT SERPL: 11 (ref 7–25)
CALCIUM SPEC-SCNC: 9.8 MG/DL (ref 8.6–10.5)
CHLORIDE SERPL-SCNC: 105 MMOL/L (ref 98–107)
CO2 SERPL-SCNC: 27 MMOL/L (ref 22–29)
CREAT SERPL-MCNC: 0.91 MG/DL (ref 0.57–1)
DEPRECATED RDW RBC AUTO: 48.8 FL (ref 37–54)
EGFRCR SERPLBLD CKD-EPI 2021: 62 ML/MIN/1.73
EOSINOPHIL # BLD AUTO: 0.11 10*3/MM3 (ref 0–0.4)
EOSINOPHIL NFR BLD AUTO: 3.6 % (ref 0.3–6.2)
ERYTHROCYTE [DISTWIDTH] IN BLOOD BY AUTOMATED COUNT: 14.8 % (ref 12.3–15.4)
GLOBULIN UR ELPH-MCNC: 2.3 GM/DL
GLUCOSE SERPL-MCNC: 194 MG/DL (ref 65–99)
HBA1C MFR BLD: 6.7 % (ref 4.8–5.6)
HCT VFR BLD AUTO: 42.4 % (ref 34–46.6)
HGB BLD-MCNC: 13.5 G/DL (ref 12–15.9)
IMM GRANULOCYTES # BLD AUTO: 0.01 10*3/MM3 (ref 0–0.05)
IMM GRANULOCYTES NFR BLD AUTO: 0.3 % (ref 0–0.5)
LYMPHOCYTES # BLD AUTO: 0.67 10*3/MM3 (ref 0.7–3.1)
LYMPHOCYTES NFR BLD AUTO: 22.1 % (ref 19.6–45.3)
MCH RBC QN AUTO: 29 PG (ref 26.6–33)
MCHC RBC AUTO-ENTMCNC: 31.8 G/DL (ref 31.5–35.7)
MCV RBC AUTO: 91.2 FL (ref 79–97)
MONOCYTES # BLD AUTO: 0.32 10*3/MM3 (ref 0.1–0.9)
MONOCYTES NFR BLD AUTO: 10.6 % (ref 5–12)
NEUTROPHILS NFR BLD AUTO: 1.89 10*3/MM3 (ref 1.7–7)
NEUTROPHILS NFR BLD AUTO: 62.4 % (ref 42.7–76)
NRBC BLD AUTO-RTO: 0 /100 WBC (ref 0–0.2)
PLATELET # BLD AUTO: 67 10*3/MM3 (ref 140–450)
PMV BLD AUTO: 12 FL (ref 6–12)
POTASSIUM SERPL-SCNC: 3.9 MMOL/L (ref 3.5–5.2)
PROT SERPL-MCNC: 6.2 G/DL (ref 6–8.5)
RBC # BLD AUTO: 4.65 10*6/MM3 (ref 3.77–5.28)
SODIUM SERPL-SCNC: 142 MMOL/L (ref 136–145)
T-UPTAKE NFR SERPL: 1 TBI (ref 0.8–1.3)
T4 SERPL-MCNC: 6.97 MCG/DL (ref 4.5–11.7)
TSH SERPL DL<=0.05 MIU/L-ACNC: 3.51 UIU/ML (ref 0.27–4.2)
WBC NRBC COR # BLD: 3.03 10*3/MM3 (ref 3.4–10.8)

## 2022-05-16 PROCEDURE — 99214 OFFICE O/P EST MOD 30 MIN: CPT | Performed by: INTERNAL MEDICINE

## 2022-05-16 PROCEDURE — 84479 ASSAY OF THYROID (T3 OR T4): CPT

## 2022-05-16 PROCEDURE — 80053 COMPREHEN METABOLIC PANEL: CPT

## 2022-05-16 PROCEDURE — 83036 HEMOGLOBIN GLYCOSYLATED A1C: CPT

## 2022-05-16 PROCEDURE — 36415 COLL VENOUS BLD VENIPUNCTURE: CPT

## 2022-05-16 PROCEDURE — 93000 ELECTROCARDIOGRAM COMPLETE: CPT | Performed by: INTERNAL MEDICINE

## 2022-05-16 PROCEDURE — 84436 ASSAY OF TOTAL THYROXINE: CPT

## 2022-05-16 PROCEDURE — 85025 COMPLETE CBC W/AUTO DIFF WBC: CPT

## 2022-05-16 PROCEDURE — 84443 ASSAY THYROID STIM HORMONE: CPT

## 2022-05-16 NOTE — PROGRESS NOTES
Date of Office Visit: 22  Encounter Provider: Desean Earl MD  Place of Service: [facility] CARDIOLOGY  Patient Name: Pamela Durham  :1935    Chief complaint:   Essential hypertension  Coronary artery disease with  of the circumflex and 60 to 70% LAD stenosis.  History of GI bleed Nuun Immunity reviewed    HPI:  86 year old with a past medical history significant for coronary artery disease.  She was found to have a non-STEMI during a hospitalization.  That same hospitalization she was found to have multiple colonic angiodysplastic lesions that were treated with argon plasma coagulation.  She ultimately underwent cardiac catheterization which showed a  of the circumflex, 40% RCA, and OM1 branch that filled via left to left collaterals, and a 60-70% LAD lesion.  No intervention was performed secondary to the fact that she was asymptomatic and because she was at high risk of bleeding.  We have managed her medically since.      Since our last visit she has been doing well.  She denies any chest pain or dyspnea on exertion.   No orthopnea or PND.  She is tolerating this current medical regimen without any difficulties.  She has not had lab work since last year.  Pressure is mildly elevated here today at 146/82.    Past Medical History:   Diagnosis Date   • Anemia    • Cirrhosis, non-alcoholic (CMS/[facility])    • CKD (chronic kidney disease) stage 3, GFR 30-59 ml/min (CMS/[facility]) 2019   • Gallbladder stone without cholecystitis or obstruction     gallstone seen on ultrasound    • GERD (gastroesophageal reflux disease)    • Health care maintenance    • Hyperlipidemia    • Hypertension    • Lumbar canal stenosis    • Lumbar radiculopathy    • MI (myocardial infarction) (CMS/HCC) 2016    NON-STEMI   • NSTEMI (non-ST elevated myocardial infarction) (CMS/HCC)    • Osteoarthritis    • Thrombophlebitis of superficial veins of upper extremities     LEFT        Past Surgical History:   Procedure Laterality Date   • CARDIAC CATHETERIZATION N/A 3/1/2017    Procedure: Coronary angiography;  Surgeon: Desean Earl MD;  Location: [facility] CATH INVASIVE LOCATION;  Service:    • COLONOSCOPY N/A 12/27/2016    Procedure: COLONOSCOPY INTO CECUM WITH ARGON PLASMA COAGULATION;  Surgeon: Javier Peñaloza MD;  Location: [facility] ENDOSCOPY;  Service:    • ENDOSCOPY N/A 12/27/2016    Procedure: ESOPHAGOGASTRODUODENOSCOPY WITH COLD BIOPSIES;  Surgeon: Javier Peñaloza MD;  Location: [facility] ENDOSCOPY;  Service:    • HIP ARTHROPLASTY Right 04/01/2015   • HIP CLOSED REDUCTION Left 1/25/2019    Procedure: LT. HIP CLOSED REDUCTION;  Surgeon: Domenico Peñaloza MD;  Location: [facility] OR;  Service: Orthopedics   • HYSTERECTOMY  1986   • JOINT REPLACEMENT     • KNEE SURGERY Bilateral     2007 & 2008   • LUNG SURGERY  1965   • TONSILLECTOMY     • TOTAL HIP ARTHROPLASTY Left 1/23/2019    Procedure: LEFT TOTAL HIP ARTHROPLASTY MARICRUZ NAVIGATION;  Surgeon: Domenico Peñaloza MD;  Location: [facility] OR;  Service: Orthopedics       Social History     Socioeconomic History   • Marital status:    • Years of education: College   Tobacco Use   • Smoking status: Never Smoker   • Smokeless tobacco: Never Used   • Tobacco comment: occ caffeine use   Substance and Sexual Activity   • Alcohol use: No   • Drug use: No   • Sexual activity: Defer       Family History   Problem Relation Age of Onset   • Hypertension Other    • Heart disease Other    • Liver cancer Brother    • Ovarian cancer Daughter         diagnosed 2012   • No Known Problems Mother    • Heart disease Father    • Heart attack Father    • Hypertension Father    • Cancer Maternal Grandmother    • No Known Problems Maternal Grandfather    • No Known Problems Paternal Grandmother    • No Known Problems Paternal Grandfather    • Malig Hyperthermia Neg Hx        Review of Systems   Constitutional: Negative for chills, fever and  malaise/fatigue.   HENT: Negative for nosebleeds and sore throat.    Eyes: Negative for blurred vision and double vision.   Cardiovascular: Negative for chest pain, claudication, dyspnea on exertion, leg swelling, near-syncope, orthopnea, palpitations, paroxysmal nocturnal dyspnea and syncope.   Respiratory: Negative for cough, shortness of breath and snoring.    Endocrine: Negative for cold intolerance, heat intolerance and polydipsia.   Skin: Negative for itching, poor wound healing and rash.   Musculoskeletal: Negative for joint pain, joint swelling, muscle weakness and myalgias.   Gastrointestinal: Negative for abdominal pain, diarrhea, melena, nausea and vomiting.   Genitourinary: Negative for frequency and hematuria.   Neurological: Negative for light-headedness, loss of balance, numbness, paresthesias, seizures, vertigo and weakness.   Psychiatric/Behavioral: Negative for altered mental status and depression.   Allergic/Immunologic: Negative.    All other systems reviewed and are negative.      No Known Allergies      Current Outpatient Medications:   •  atorvastatin (LIPITOR) 80 MG tablet, TAKE 1 TABLET BY MOUTH EVERY NIGHT, Disp: 90 tablet, Rfl: 3  •  BIOTIN PO, Take  by mouth Daily., Disp: , Rfl:   •  Calcium Carbonate-Vit D-Min (CALCIUM 1200 PO), Take  by mouth 2 (Two) Times a Day., Disp: , Rfl:   •  carvedilol (COREG) 3.125 MG tablet, TAKE 1 TABLET BY MOUTH EVERY 12 HOURS, Disp: 180 tablet, Rfl: 3  •  Cholecalciferol (VITAMIN D3) 125 MCG (5000 UT) capsule capsule, Take 5,000 Units by mouth Daily., Disp: , Rfl:   •  lisinopril (PRINIVIL,ZESTRIL) 5 MG tablet, TAKE 1 TABLET BY MOUTH DAILY, Disp: 90 tablet, Rfl: 1  •  Multiple Vitamin (MULTI VITAMIN DAILY PO), Take  by mouth Daily. Centrum silver , Disp: , Rfl:   •  pantoprazole (PROTONIX) 40 MG EC tablet, TAKE 1 TABLET BY MOUTH DAILY, Disp: 30 tablet, Rfl: 5  •  VITAMIN E PO, Take  by mouth Daily., Disp: , Rfl:       Objective:     There were no vitals  filed for this visit.  There is no height or weight on file to calculate BMI.    PHYSICAL EXAM:  Constitutional:       Appearance: Well-developed.   Eyes:      General: No scleral icterus.     Conjunctiva/sclera: Conjunctivae normal.   HENT:      Head: Normocephalic and atraumatic.   Neck:      Thyroid: No thyromegaly.      Vascular: Normal carotid pulses. No carotid bruit, hepatojugular reflux or JVD.      Trachea: No tracheal deviation.   Pulmonary:      Effort: No respiratory distress.      Breath sounds: Normal breath sounds. No decreased breath sounds. No wheezing. No rhonchi. No rales.   Chest:      Chest wall: Not tender to palpatation.   Cardiovascular:      Normal rate. Regular rhythm.      No gallop.   Pulses:     Carotid: 2+ bilaterally.     Radial: 2+ bilaterally.     Femoral: 2+ bilaterally.     Dorsalis pedis: 2+ bilaterally.     Posterior tibial: 2+ bilaterally.  Edema:     Peripheral edema absent.   Abdominal:      General: Bowel sounds are normal. There is no distension.      Palpations: Abdomen is soft.      Tenderness: There is no abdominal tenderness.   Musculoskeletal:         General: No deformity.      Cervical back: Normal range of motion and neck supple. Skin:     Findings: No erythema or rash.   Neurological:      Mental Status: Alert and oriented to person, place, and time.      Sensory: No sensory deficit.   Psychiatric:         Behavior: Behavior normal.           ECG 12 Lead    Date/Time: 5/16/2022 11:16 AM  Performed by: Desean Earl MD  Authorized by: Desean Earl MD   Comparison: compared with previous ECG from 5/12/2021  Similar to previous ECG  Rhythm: sinus rhythm  Ectopy: unifocal PVCs  Rate: normal  QRS axis: normal  Other findings: non-specific ST-T wave changes              Assessment:      No diagnosis found.  No orders of the defined types were placed in this encounter.       Plan:   85 year old female with a medical history of CAD, chronic anemia, and  essential hypertension. Coronary disease was medically managed secondary to anemia due to angiodysplasia, documented at the time. She is doing well and has no angina at this time.  She has no new complaints currently.  Blood pressure is mildly elevated here today, however she states that this has been well controlled at home.    1.  CAD:  LCX along with severely calcified LAD stenosis       -No angina at this time       -Continue ASA and atorvastatin.  Continue carvedilol therapy.  No significant resting bradycardia on carvedilol therapy.       -No additional ischemic w/u indicated at this time    2.  Essential hypertension: Acceptable with advanced age       -No changes in current regimen.  Continue lisinopril.  No issues with hyperkalemia or elevated creatinine.  Repeat lab work has been ordered.    3.  Hyperlipidemia: Labs reviewed from 5/20/2021.  Lipid panel at goal.  No changes to atorvastatin.  No elevation in transaminases.    We will repeat a CMP and BMP today.  Also get a TSH and free T4 secondary to the complaint of fatigue.  I think this is likely just advanced age

## 2022-05-17 DIAGNOSIS — D69.6 THROMBOCYTOPENIA: Primary | ICD-10-CM

## 2022-05-17 NOTE — PROGRESS NOTES
She used to see Dr. Gutiérrez, but hasn't been seen since 2019. Nd she is now a new pt. Would you like to place another referral?    Tonia Earl MD   5/17/2022  2:19 PM EDT      Thyroid looks fine.  No significant anemia.  She does have a thrombocytopenia that is worse.  Please ask if she has a hematologist.

## 2022-05-31 RX ORDER — LISINOPRIL 5 MG/1
5 TABLET ORAL DAILY
Qty: 90 TABLET | Refills: 1 | Status: SHIPPED | OUTPATIENT
Start: 2022-05-31 | End: 2022-11-28

## 2022-06-15 ENCOUNTER — LAB (OUTPATIENT)
Dept: OTHER | Facility: HOSPITAL | Age: 87
End: 2022-06-15

## 2022-06-15 ENCOUNTER — CONSULT (OUTPATIENT)
Dept: ONCOLOGY | Facility: CLINIC | Age: 87
End: 2022-06-15

## 2022-06-15 VITALS
HEIGHT: 62 IN | DIASTOLIC BLOOD PRESSURE: 70 MMHG | SYSTOLIC BLOOD PRESSURE: 139 MMHG | WEIGHT: 157.3 LBS | RESPIRATION RATE: 16 BRPM | TEMPERATURE: 97.3 F | BODY MASS INDEX: 28.95 KG/M2 | OXYGEN SATURATION: 94 % | HEART RATE: 66 BPM

## 2022-06-15 DIAGNOSIS — D69.6 THROMBOCYTOPENIA: Primary | ICD-10-CM

## 2022-06-15 DIAGNOSIS — D50.9 IRON DEFICIENCY ANEMIA, UNSPECIFIED IRON DEFICIENCY ANEMIA TYPE: Primary | ICD-10-CM

## 2022-06-15 DIAGNOSIS — D50.9 IRON DEFICIENCY ANEMIA, UNSPECIFIED IRON DEFICIENCY ANEMIA TYPE: ICD-10-CM

## 2022-06-15 LAB
BASOPHILS # BLD AUTO: 0.03 10*3/MM3 (ref 0–0.2)
BASOPHILS NFR BLD AUTO: 0.8 % (ref 0–1.5)
DEPRECATED RDW RBC AUTO: 50.6 FL (ref 37–54)
EOSINOPHIL # BLD AUTO: 0.13 10*3/MM3 (ref 0–0.4)
EOSINOPHIL NFR BLD AUTO: 3.6 % (ref 0.3–6.2)
ERYTHROCYTE [DISTWIDTH] IN BLOOD BY AUTOMATED COUNT: 15.5 % (ref 12.3–15.4)
FERRITIN SERPL-MCNC: 86 NG/ML (ref 13–150)
HCT VFR BLD AUTO: 39.7 % (ref 34–46.6)
HGB BLD-MCNC: 13 G/DL (ref 12–15.9)
HGB RETIC QN AUTO: 34.1 PG (ref 29.8–36.1)
IMM GRANULOCYTES # BLD AUTO: 0.01 10*3/MM3 (ref 0–0.05)
IMM GRANULOCYTES NFR BLD AUTO: 0.3 % (ref 0–0.5)
IMM RETICS NFR: 8.9 % (ref 3–15.8)
IRON 24H UR-MRATE: 56 MCG/DL (ref 37–145)
IRON SATN MFR SERPL: 16 % (ref 20–50)
LYMPHOCYTES # BLD AUTO: 0.88 10*3/MM3 (ref 0.7–3.1)
LYMPHOCYTES NFR BLD AUTO: 24.2 % (ref 19.6–45.3)
MCH RBC QN AUTO: 29 PG (ref 26.6–33)
MCHC RBC AUTO-ENTMCNC: 32.7 G/DL (ref 31.5–35.7)
MCV RBC AUTO: 88.6 FL (ref 79–97)
MONOCYTES # BLD AUTO: 0.36 10*3/MM3 (ref 0.1–0.9)
MONOCYTES NFR BLD AUTO: 9.9 % (ref 5–12)
NEUTROPHILS NFR BLD AUTO: 2.23 10*3/MM3 (ref 1.7–7)
NEUTROPHILS NFR BLD AUTO: 61.2 % (ref 42.7–76)
NRBC BLD AUTO-RTO: 0 /100 WBC (ref 0–0.2)
PLATELET # BLD AUTO: 87 10*3/MM3 (ref 140–450)
PMV BLD AUTO: 11.9 FL (ref 6–12)
RBC # BLD AUTO: 4.48 10*6/MM3 (ref 3.77–5.28)
RETICS # AUTO: 0.06 10*6/MM3 (ref 0.02–0.13)
RETICS/RBC NFR AUTO: 1.31 % (ref 0.7–1.9)
TIBC SERPL-MCNC: 361 MCG/DL (ref 298–536)
TRANSFERRIN SERPL-MCNC: 242 MG/DL (ref 200–360)
VIT B12 BLD-MCNC: 814 PG/ML (ref 211–946)
WBC NRBC COR # BLD: 3.64 10*3/MM3 (ref 3.4–10.8)

## 2022-06-15 PROCEDURE — 36415 COLL VENOUS BLD VENIPUNCTURE: CPT

## 2022-06-15 PROCEDURE — 82607 VITAMIN B-12: CPT

## 2022-06-15 PROCEDURE — 83540 ASSAY OF IRON: CPT | Performed by: INTERNAL MEDICINE

## 2022-06-15 PROCEDURE — 85025 COMPLETE CBC W/AUTO DIFF WBC: CPT | Performed by: INTERNAL MEDICINE

## 2022-06-15 PROCEDURE — 84466 ASSAY OF TRANSFERRIN: CPT | Performed by: INTERNAL MEDICINE

## 2022-06-15 PROCEDURE — 85046 RETICYTE/HGB CONCENTRATE: CPT | Performed by: INTERNAL MEDICINE

## 2022-06-15 PROCEDURE — 99214 OFFICE O/P EST MOD 30 MIN: CPT | Performed by: INTERNAL MEDICINE

## 2022-06-15 PROCEDURE — 82728 ASSAY OF FERRITIN: CPT | Performed by: INTERNAL MEDICINE

## 2022-06-15 NOTE — PROGRESS NOTES
Subjective     REASON FOR CONSULTATION:  1.  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  Patient has no history of alcohol use.  Provide an opinion on any further workup or treatment                             REQUESTING PHYSICIAN: Dr. Nicholas    RECORDS OBTAINED:  Records of the patients history including those obtained from the referring provider were reviewed and summarized in detail.    HISTORY OF PRESENT ILLNESS:  The patient is a 86 y.o. year old female who is here for an opinion about the above issue.    History of Present Illness patient is a 86-year-old female who had been seen by us back in 2018 and 2019 because of chronic leukopenia and thrombocytopenia.  She has history of cirrhosis of the liver with splenomegaly.  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  She follows up with Dr. Javier Yeboah.  She also had antiscleroderma antibodies positive and rheumatoid factor positive at which time we had asked her to be referred to rheumatology and gastroenterology.  She has been up-to-date with her colonoscopy and EGD.    Back in 2018 she had colonoscopy which showed multiple nonbleeding colonic angiodysplastic lesions requiring argon plasma coagulation.  EGD was negative.  They had suggested to avoid anticoagulant and enter platelet treatment.  And Dr. Martinez asked to hold the antiplatelet agents for a few weeks until she healed up.  Patient currently has no GI bleeding.  She had an MI back in 2016 and she is being referred here again because of thrombocytopenia.  She saw Dr. Nicholas May 2022 who referred here here again for thrombocytopenia.  He still wanted her to continue aspirin though patient states that she was asked to continue to discontinue.    Patient had non-ST RICKY in 2016.  She was found to have multiple colonic angiodysplastic lesions which required argon plasma coagulation.  Since then back in 2019 also she required argon plasma coagulation by Dr. Javire Peñaloza.    Her  "platelets 2 weeks ago at 67 but today it is 87.  I have reviewed the peripheral smear today and it shows evidence of decreased platelets, no immature cells no evidence of metamyelocytes or myelocytes.  She does not have any hypochromia but has mild microcytosis.  She complains of fatigue and we will check B12 and iron studies.  She is stable since 2018 and her blood counts without evidence of any dropped further        ONCOLOGIC HISTORY:  (History from previous dates can be found in the separate document.)   patient is a 81-year-old female who was referred here for evaluation of thrombus cytopenia. She has a history of hypertension and hyperlipidemia.. Her CBC her hemoglobin was 13.0 white count of 3.56 platelet of 73 on May 31, 2017. Looking back even in December 2016 her hemoglobin was 9.2 white count of 7.57\" platelet of 98. She did drop her hemoglobin to 7.9. Today her hemoglobin is back up to 13.8. She has no complaints. She feels reasonably good and is active. She is not on any medications that can affect her platelet count. She is been on Plendil 4 years and we will check if Plendil can cause low platelet count and low white count. Rest of the drugs do not seem to affect her platelet count. She has no active bleeding. Patient underwent EGD and colonoscopy by Dr. Yeboah and apparently underwent EGD which showed chronic gastritis in December 2016. Colonoscopy showed evidence of multiple angiodysplasias in the ascending colon for which he patient underwent coagulation. Since then patient has not had GI bleeding. Patient also had a non-ST elevation myocardial infarction in March 2017. Which patient is on treatment with medications. Patient has some chronic fatigue otherwise feeling reasonably well. She has lost significant amount of weight in the last 6 months.     Chronic pancytopenia, hemoglobin improved now after patient underwent coagulation of angiodysplasia of the ascending colon. However the white count and " the platelet count are still low. I do not believe any of her medications caused her counts to be low. Certainly we can obtain a B12, RBC folate, MIRANDA and rheumatoid factor as well as C-reactive protein and ESR. She'll also obtain a flow cytometry. On examination of the peripheral smear there is decreased platelet counts, there is no evidence of platelet clumps, no evidence of immature cells including blasts. I do not appreciate a splenomegaly on examination. We did discuss about obtaining a bone marrow aspiration and biopsy to rule out underlying myelodysplasia. Patient does not want to go through     Past Medical History:   Diagnosis Date   • Anemia    • Cirrhosis, non-alcoholic ([facility])    • CKD (chronic kidney disease) stage 3, GFR 30-59 ml/min ([facility]) 1/24/2019   • Gallbladder stone without cholecystitis or obstruction     gallstone seen on ultrasound 2007   • GERD (gastroesophageal reflux disease)    • Health care maintenance    • Hyperlipidemia    • Hypertension    • Lumbar canal stenosis    • Lumbar radiculopathy    • MI (myocardial infarction) ([facility]) 12/25/2016    NON-STEMI   • NSTEMI (non-ST elevated myocardial infarction) ([facility])    • Osteoarthritis    • Thrombophlebitis of superficial veins of upper extremities     LEFT        Past Surgical History:   Procedure Laterality Date   • CARDIAC CATHETERIZATION N/A 3/1/2017    Procedure: Coronary angiography;  Surgeon: Desean Earl MD;  Location: [facility] CATH INVASIVE LOCATION;  Service:    • COLONOSCOPY N/A 12/27/2016    Procedure: COLONOSCOPY INTO CECUM WITH ARGON PLASMA COAGULATION;  Surgeon: Javier Peñaloza MD;  Location: [facility] ENDOSCOPY;  Service:    • ENDOSCOPY N/A 12/27/2016    Procedure: ESOPHAGOGASTRODUODENOSCOPY WITH COLD BIOPSIES;  Surgeon: Javier Peñaloza MD;  Location: [facility] ENDOSCOPY;  Service:    • HIP ARTHROPLASTY Right 04/01/2015   • HIP CLOSED REDUCTION Left 1/25/2019    Procedure: LT. HIP CLOSED REDUCTION;  Surgeon: Domenico Peñaloza MD;   Location: [facility] OR;  Service: Orthopedics   • HYSTERECTOMY  1986   • JOINT REPLACEMENT     • KNEE SURGERY Bilateral     2007 & 2008   • LUNG SURGERY  1965   • TONSILLECTOMY     • TOTAL HIP ARTHROPLASTY Left 1/23/2019    Procedure: LEFT TOTAL HIP ARTHROPLASTY MARICRUZ ROSY;  Surgeon: Domenico Peñaloza MD;  Location: [facility];  Service: Orthopedics        Current Outpatient Medications on File Prior to Visit   Medication Sig Dispense Refill   • atorvastatin (LIPITOR) 80 MG tablet TAKE 1 TABLET BY MOUTH EVERY NIGHT 90 tablet 3   • BIOTIN PO Take  by mouth Daily.     • Calcium Carbonate-Vit D-Min (CALCIUM 1200 PO) Take  by mouth 2 (Two) Times a Day.     • carvedilol (COREG) 3.125 MG tablet TAKE 1 TABLET BY MOUTH EVERY 12 HOURS 180 tablet 3   • Cholecalciferol (VITAMIN D3) 125 MCG (5000 UT) capsule capsule Take 5,000 Units by mouth Daily.     • lisinopril (PRINIVIL,ZESTRIL) 5 MG tablet Take 1 tablet by mouth Daily. 90 tablet 1   • Multiple Vitamin (MULTI VITAMIN DAILY PO) Take  by mouth Daily. Centrum silver     • VITAMIN E PO Take  by mouth Daily.     • pantoprazole (PROTONIX) 40 MG EC tablet TAKE 1 TABLET BY MOUTH DAILY 30 tablet 5     No current facility-administered medications on file prior to visit.        ALLERGIES:  No Known Allergies     Social History     Socioeconomic History   • Marital status:    • Years of education: College   Tobacco Use   • Smoking status: Never Smoker   • Smokeless tobacco: Never Used   • Tobacco comment: occ caffeine use   Substance and Sexual Activity   • Alcohol use: No   • Drug use: No   • Sexual activity: Defer        Family History   Problem Relation Age of Onset   • Hypertension Other    • Heart disease Other    • Liver cancer Brother    • Ovarian cancer Daughter         diagnosed 2012   • No Known Problems Mother    • Heart disease Father    • Heart attack Father    • Hypertension Father    • Cancer Maternal Grandmother    • No Known Problems Maternal  "Grandfather    • No Known Problems Paternal Grandmother    • No Known Problems Paternal Grandfather    • Malig Hyperthermia Neg Hx         Review of Systems   Constitutional: Positive for fatigue. Negative for appetite change, chills, diaphoresis, fever and unexpected weight change.   HENT: Negative for hearing loss, sore throat and trouble swallowing.    Respiratory: Negative for cough, chest tightness, shortness of breath and wheezing.    Cardiovascular: Negative for chest pain, palpitations and leg swelling.   Gastrointestinal: Negative for abdominal distention, abdominal pain, constipation, diarrhea, nausea and vomiting.   Genitourinary: Negative for dysuria, frequency, hematuria and urgency.   Musculoskeletal: Negative for joint swelling.        No muscle weakness.   Skin: Negative for rash and wound.   Neurological: Negative for seizures, syncope, speech difficulty, weakness, numbness and headaches.   Hematological: Negative for adenopathy. Does not bruise/bleed easily.   Psychiatric/Behavioral: Negative for behavioral problems, confusion and suicidal ideas.   All other systems reviewed and are negative.       Objective     Vitals:    06/15/22 1436   BP: 139/70   Pulse: 66   Resp: 16   Temp: 97.3 °F (36.3 °C)   TempSrc: Temporal   SpO2: 94%   Weight: 71.4 kg (157 lb 4.8 oz)   Height: 158 cm (62.21\")  Comment: New Ht   PainSc: 0-No pain     Current Status 6/15/2022   ECOG score 0       Physical Exam    PThis patient's ACP documentation is up to date, and there's nothing further left to document.      CONSTITUTIONAL:  Vital signs reviewed.  No distress, looks comfortable.  EYES:  Conjunctivae and lids unremarkable.  PERRLA  EARS,NOSE,MOUTH,THROAT:  Ears and nose appear unremarkable.  Lips, teeth, gums appear unremarkable.  RESPIRATORY:  Normal respiratory effort.  Lungs clear to auscultation bilaterally.  CARDIOVASCULAR:  Normal S1, S2.  No murmurs rubs or gallops.  No significant lower extremity " edema.  GASTROINTESTINAL: Abdomen appears unremarkable.  Nontender.  No hepatomegaly.  No splenomegaly.  LYMPHATIC:  No cervical, supraclavicular, axillary lymphadenopathy.  SKIN:  Warm.  No rashes.       RECENT LABS:  Hematology WBC   Date Value Ref Range Status   06/15/2022 3.64 3.40 - 10.80 10*3/mm3 Final     RBC   Date Value Ref Range Status   06/15/2022 4.48 3.77 - 5.28 10*6/mm3 Final     Hemoglobin   Date Value Ref Range Status   06/15/2022 13.0 12.0 - 15.9 g/dL Final     Hematocrit   Date Value Ref Range Status   06/15/2022 39.7 34.0 - 46.6 % Final     Platelets   Date Value Ref Range Status   06/15/2022 87 (L) 140 - 450 10*3/mm3 Final          Assessment & Plan     .1.   Chronic pancytopenia, hemoglobin improved now after patient underwent coagulation of angiodysplasia of the ascending colon. However the white count and the platelet count are still low. I do not believe any of her medications caused her counts to be low. Looking at all labs patient has cirrhosis of the liver with splenomegaly as the cause for her pancytopenia.  Her labs are all negative except elevated rheumatoid factor and elevated and the scleroderma antibodies.  I'm unsure if there is an autoimmune component for her chronic pancytopenia.  We will refer her to both gastroenterology to evaluate the cause of her cirrhosis as she does not drink alcohol and we will also refer her to rheumatology 2 be sure there is no underlying autoimmune disease as the cause of her low counts.     I have reviewed the CT scan with the patient.     Patient has not alcoholic steatohepatitis.  But stable pancytopenia.    2.  History of MI in 2016 thought to be secondary to GI bleed    3.  Patient had GI bleed in 2016 and received angiodysplasia but a month later in 2019 had to be cauterized for angiodysplasia again     Plan 1    · Reviewed with Dr. Craig note.  · From our point her thrombocytopenia is stable chronically she is running in the same range 87.    · There is no evidence of active bleeding.  · Patient can continue baby aspirin if needed by cardiology if platelets greater than 60  · From our standpoint it is okay to do the baby aspirin if needed by cardiology  Her thrombocytopenia and leukopenia is chronic and labs stable baseline normal  · Will follow patient in 2 months in 4 months with labs and see me in 6 months  · Nurse practitioner will see him in 2 months in 4 months  · Follow-up with me in 6 months    MD Dr.Semdur Dr Neetu Blankenship makk

## 2022-08-10 ENCOUNTER — LAB (OUTPATIENT)
Dept: OTHER | Facility: HOSPITAL | Age: 87
End: 2022-08-10

## 2022-08-10 ENCOUNTER — OFFICE VISIT (OUTPATIENT)
Dept: ONCOLOGY | Facility: CLINIC | Age: 87
End: 2022-08-10

## 2022-08-10 VITALS
OXYGEN SATURATION: 94 % | HEART RATE: 74 BPM | WEIGHT: 157.5 LBS | TEMPERATURE: 97.1 F | DIASTOLIC BLOOD PRESSURE: 74 MMHG | BODY MASS INDEX: 28.98 KG/M2 | SYSTOLIC BLOOD PRESSURE: 173 MMHG | HEIGHT: 62 IN | RESPIRATION RATE: 18 BRPM

## 2022-08-10 DIAGNOSIS — D50.8 OTHER IRON DEFICIENCY ANEMIA: Primary | ICD-10-CM

## 2022-08-10 DIAGNOSIS — D50.9 IRON DEFICIENCY ANEMIA, UNSPECIFIED IRON DEFICIENCY ANEMIA TYPE: Primary | ICD-10-CM

## 2022-08-10 LAB
ALBUMIN SERPL-MCNC: 3.8 G/DL (ref 3.5–5.2)
ALBUMIN/GLOB SERPL: 1.5 G/DL
ALP SERPL-CCNC: 117 U/L (ref 39–117)
ALT SERPL W P-5'-P-CCNC: 25 U/L (ref 1–33)
ANION GAP SERPL CALCULATED.3IONS-SCNC: 8.4 MMOL/L (ref 5–15)
AST SERPL-CCNC: 46 U/L (ref 1–32)
BASOPHILS # BLD AUTO: 0.02 10*3/MM3 (ref 0–0.2)
BASOPHILS NFR BLD AUTO: 0.7 % (ref 0–1.5)
BILIRUB SERPL-MCNC: 0.9 MG/DL (ref 0–1.2)
BUN SERPL-MCNC: 14 MG/DL (ref 8–23)
BUN/CREAT SERPL: 15.6 (ref 7–25)
CALCIUM SPEC-SCNC: 9.8 MG/DL (ref 8.6–10.5)
CHLORIDE SERPL-SCNC: 107 MMOL/L (ref 98–107)
CO2 SERPL-SCNC: 26.6 MMOL/L (ref 22–29)
CREAT SERPL-MCNC: 0.9 MG/DL (ref 0.57–1)
DEPRECATED RDW RBC AUTO: 50.1 FL (ref 37–54)
EGFRCR SERPLBLD CKD-EPI 2021: 62.8 ML/MIN/1.73
EOSINOPHIL # BLD AUTO: 0.08 10*3/MM3 (ref 0–0.4)
EOSINOPHIL NFR BLD AUTO: 2.6 % (ref 0.3–6.2)
ERYTHROCYTE [DISTWIDTH] IN BLOOD BY AUTOMATED COUNT: 15.4 % (ref 12.3–15.4)
FERRITIN SERPL-MCNC: 86.4 NG/ML (ref 13–150)
GLOBULIN UR ELPH-MCNC: 2.6 GM/DL
GLUCOSE SERPL-MCNC: 260 MG/DL (ref 65–99)
HCT VFR BLD AUTO: 39.9 % (ref 34–46.6)
HGB BLD-MCNC: 13.3 G/DL (ref 12–15.9)
IMM GRANULOCYTES # BLD AUTO: 0.01 10*3/MM3 (ref 0–0.05)
IMM GRANULOCYTES NFR BLD AUTO: 0.3 % (ref 0–0.5)
IRON 24H UR-MRATE: 87 MCG/DL (ref 37–145)
IRON SATN MFR SERPL: 24 % (ref 20–50)
LYMPHOCYTES # BLD AUTO: 0.69 10*3/MM3 (ref 0.7–3.1)
LYMPHOCYTES NFR BLD AUTO: 22.8 % (ref 19.6–45.3)
MCH RBC QN AUTO: 29.5 PG (ref 26.6–33)
MCHC RBC AUTO-ENTMCNC: 33.3 G/DL (ref 31.5–35.7)
MCV RBC AUTO: 88.5 FL (ref 79–97)
MONOCYTES # BLD AUTO: 0.3 10*3/MM3 (ref 0.1–0.9)
MONOCYTES NFR BLD AUTO: 9.9 % (ref 5–12)
NEUTROPHILS NFR BLD AUTO: 1.93 10*3/MM3 (ref 1.7–7)
NEUTROPHILS NFR BLD AUTO: 63.7 % (ref 42.7–76)
NRBC BLD AUTO-RTO: 0 /100 WBC (ref 0–0.2)
PLATELET # BLD AUTO: 70 10*3/MM3 (ref 140–450)
PMV BLD AUTO: 12 FL (ref 6–12)
POTASSIUM SERPL-SCNC: 4.2 MMOL/L (ref 3.5–5.2)
PROT SERPL-MCNC: 6.4 G/DL (ref 6–8.5)
RBC # BLD AUTO: 4.51 10*6/MM3 (ref 3.77–5.28)
SODIUM SERPL-SCNC: 142 MMOL/L (ref 136–145)
TIBC SERPL-MCNC: 359 MCG/DL (ref 298–536)
TRANSFERRIN SERPL-MCNC: 241 MG/DL (ref 200–360)
WBC NRBC COR # BLD: 3.03 10*3/MM3 (ref 3.4–10.8)

## 2022-08-10 PROCEDURE — 99213 OFFICE O/P EST LOW 20 MIN: CPT

## 2022-08-10 PROCEDURE — 84466 ASSAY OF TRANSFERRIN: CPT

## 2022-08-10 PROCEDURE — 80053 COMPREHEN METABOLIC PANEL: CPT

## 2022-08-10 PROCEDURE — 83540 ASSAY OF IRON: CPT

## 2022-08-10 PROCEDURE — 85025 COMPLETE CBC W/AUTO DIFF WBC: CPT

## 2022-08-10 PROCEDURE — 82728 ASSAY OF FERRITIN: CPT

## 2022-08-10 PROCEDURE — 36415 COLL VENOUS BLD VENIPUNCTURE: CPT

## 2022-08-10 NOTE — PROGRESS NOTES
Subjective     REASON FOR CONSULTATION:  1.  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  Patient has no history of alcohol use.  Provide an opinion on any further workup or treatment                             REQUESTING PHYSICIAN: Dr. Nicholas    RECORDS OBTAINED:  Records of the patients history including those obtained from the referring provider were reviewed and summarized in detail.    HISTORY OF PRESENT ILLNESS:  The patient is a 86 y.o. year old female who is here for an opinion about the above issue.    History of Present Illness patient is a 86-year-old female who had been seen by us back in 2018 and 2019 because of chronic leukopenia and thrombocytopenia.  She has history of cirrhosis of the liver with splenomegaly.  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  She follows up with Dr. Javier Yeboah.  She also had antiscleroderma antibodies positive and rheumatoid factor positive at which time we had asked her to be referred to rheumatology and gastroenterology.  She has been up-to-date with her colonoscopy and EGD.    Back in 2018 she had colonoscopy which showed multiple nonbleeding colonic angiodysplastic lesions requiring argon plasma coagulation.  EGD was negative.  They had suggested to avoid anticoagulant and enter platelet treatment.  And Dr. Martinez asked to hold the antiplatelet agents for a few weeks until she healed up.  Patient currently has no GI bleeding.  She had an MI back in 2016 and she is being referred here again because of thrombocytopenia.  She saw Dr. Nicholas May 2022 who referred here here again for thrombocytopenia.  He still wanted her to continue aspirin though patient states that she was asked to continue to discontinue.    Patient had non-ST RICKY in 2016.  She was found to have multiple colonic angiodysplastic lesions which required argon plasma coagulation.  Since then back in 2019 also she required argon plasma coagulation by Dr. Javier Peñaloza.    Her  "platelets 2 weeks ago at 67 but today it is 87.  I have reviewed the peripheral smear today and it shows evidence of decreased platelets, no immature cells no evidence of metamyelocytes or myelocytes.  She does not have any hypochromia but has mild microcytosis.  She complains of fatigue and we will check B12 and iron studies.  She is stable since 2018 and her blood counts without evidence of any dropped further    INTERVAL HISTORY:   Patient returns the office today for 2-month follow-up and evaluation of her chronic pancytopenia.  Overall, patient is doing very well.  She denies bleeding.  Patient states she lives alone in a patio home. She denies shortness of breath or worsening fatigue. She denies new concerns today.       ONCOLOGIC HISTORY:  (History from previous dates can be found in the separate document.)   patient is a 81-year-old female who was referred here for evaluation of thrombus cytopenia. She has a history of hypertension and hyperlipidemia.. Her CBC her hemoglobin was 13.0 white count of 3.56 platelet of 73 on May 31, 2017. Looking back even in December 2016 her hemoglobin was 9.2 white count of 7.57\" platelet of 98. She did drop her hemoglobin to 7.9. Today her hemoglobin is back up to 13.8. She has no complaints. She feels reasonably good and is active. She is not on any medications that can affect her platelet count. She is been on Plendil 4 years and we will check if Plendil can cause low platelet count and low white count. Rest of the drugs do not seem to affect her platelet count. She has no active bleeding. Patient underwent EGD and colonoscopy by Dr. Yeboah and apparently underwent EGD which showed chronic gastritis in December 2016. Colonoscopy showed evidence of multiple angiodysplasias in the ascending colon for which he patient underwent coagulation. Since then patient has not had GI bleeding. Patient also had a non-ST elevation myocardial infarction in March 2017. Which patient is on " treatment with medications. Patient has some chronic fatigue otherwise feeling reasonably well. She has lost significant amount of weight in the last 6 months.     Chronic pancytopenia, hemoglobin improved now after patient underwent coagulation of angiodysplasia of the ascending colon. However the white count and the platelet count are still low. I do not believe any of her medications caused her counts to be low. Certainly we can obtain a B12, RBC folate, MIRANDA and rheumatoid factor as well as C-reactive protein and ESR. She'll also obtain a flow cytometry. On examination of the peripheral smear there is decreased platelet counts, there is no evidence of platelet clumps, no evidence of immature cells including blasts. I do not appreciate a splenomegaly on examination. We did discuss about obtaining a bone marrow aspiration and biopsy to rule out underlying myelodysplasia. Patient does not want to go through         Past Medical History:   Diagnosis Date   • Anemia    • Cirrhosis, non-alcoholic (HCC)    • CKD (chronic kidney disease) stage 3, GFR 30-59 ml/min ([facility]) 1/24/2019   • Gallbladder stone without cholecystitis or obstruction     gallstone seen on ultrasound 2007   • GERD (gastroesophageal reflux disease)    • Health care maintenance    • Hyperlipidemia    • Hypertension    • Lumbar canal stenosis    • Lumbar radiculopathy    • MI (myocardial infarction) ([facility]) 12/25/2016    NON-STEMI   • NSTEMI (non-ST elevated myocardial infarction) ([facility])    • Osteoarthritis    • Thrombophlebitis of superficial veins of upper extremities     LEFT        Past Surgical History:   Procedure Laterality Date   • CARDIAC CATHETERIZATION N/A 3/1/2017    Procedure: Coronary angiography;  Surgeon: Desean Earl MD;  Location: [facility] CATH INVASIVE LOCATION;  Service:    • COLONOSCOPY N/A 12/27/2016    Procedure: COLONOSCOPY INTO CECUM WITH ARGON PLASMA COAGULATION;  Surgeon: Javier Peñaloza MD;  Location: [facility] ENDOSCOPY;   Service:    • ENDOSCOPY N/A 12/27/2016    Procedure: ESOPHAGOGASTRODUODENOSCOPY WITH COLD BIOPSIES;  Surgeon: Javier Peñaloza MD;  Location: [facility] ENDOSCOPY;  Service:    • HIP ARTHROPLASTY Right 04/01/2015   • HIP CLOSED REDUCTION Left 1/25/2019    Procedure: LT. HIP CLOSED REDUCTION;  Surgeon: Domenico Peñaloza MD;  Location: [facility] OR;  Service: Orthopedics   • HYSTERECTOMY  1986   • JOINT REPLACEMENT     • KNEE SURGERY Bilateral     2007 & 2008   • LUNG SURGERY  1965   • TONSILLECTOMY     • TOTAL HIP ARTHROPLASTY Left 1/23/2019    Procedure: LEFT TOTAL HIP ARTHROPLASTY MARICRUZ NAVIGATION;  Surgeon: Domenico Peñaloza MD;  Location: [facility] OR;  Service: Orthopedics        Current Outpatient Medications on File Prior to Visit   Medication Sig Dispense Refill   • atorvastatin (LIPITOR) 80 MG tablet TAKE 1 TABLET BY MOUTH EVERY NIGHT 90 tablet 3   • BIOTIN PO Take  by mouth Daily.     • Calcium Carbonate-Vit D-Min (CALCIUM 1200 PO) Take  by mouth 2 (Two) Times a Day.     • carvedilol (COREG) 3.125 MG tablet TAKE 1 TABLET BY MOUTH EVERY 12 HOURS 180 tablet 3   • Cholecalciferol (VITAMIN D3) 125 MCG (5000 UT) capsule capsule Take 5,000 Units by mouth Daily.     • lisinopril (PRINIVIL,ZESTRIL) 5 MG tablet Take 1 tablet by mouth Daily. 90 tablet 1   • Multiple Vitamin (MULTI VITAMIN DAILY PO) Take  by mouth Daily. Centrum silver     • pantoprazole (PROTONIX) 40 MG EC tablet TAKE 1 TABLET BY MOUTH DAILY 30 tablet 5   • VITAMIN E PO Take  by mouth Daily.       No current facility-administered medications on file prior to visit.        ALLERGIES:  No Known Allergies     Social History     Socioeconomic History   • Marital status:    • Years of education: College   Tobacco Use   • Smoking status: Never Smoker   • Smokeless tobacco: Never Used   • Tobacco comment: occ caffeine use   Substance and Sexual Activity   • Alcohol use: No   • Drug use: No   • Sexual activity: Defer        Family History   Problem  "Relation Age of Onset   • Hypertension Other    • Heart disease Other    • Liver cancer Brother    • Ovarian cancer Daughter         diagnosed 2012   • No Known Problems Mother    • Heart disease Father    • Heart attack Father    • Hypertension Father    • Cancer Maternal Grandmother    • No Known Problems Maternal Grandfather    • No Known Problems Paternal Grandmother    • No Known Problems Paternal Grandfather    • Malig Hyperthermia Neg Hx         Review of Systems   Constitutional: Positive for fatigue. Negative for appetite change, chills, diaphoresis, fever and unexpected weight change.   HENT: Negative for hearing loss, sore throat and trouble swallowing.    Respiratory: Negative for cough, chest tightness, shortness of breath and wheezing.    Cardiovascular: Negative for chest pain, palpitations and leg swelling.   Gastrointestinal: Negative for abdominal distention, abdominal pain, constipation, diarrhea, nausea and vomiting.   Genitourinary: Negative for dysuria, frequency, hematuria and urgency.   Musculoskeletal: Negative for joint swelling.        No muscle weakness.   Skin: Negative for rash and wound.   Neurological: Negative for seizures, syncope, speech difficulty, weakness, numbness and headaches.   Hematological: Negative for adenopathy. Does not bruise/bleed easily.   Psychiatric/Behavioral: Negative for behavioral problems, confusion and suicidal ideas.   All other systems reviewed and are negative.       Objective     Vitals:    08/10/22 1433   BP: 173/74   Pulse: 74   Resp: 18   Temp: 97.1 °F (36.2 °C)   TempSrc: Temporal   SpO2: 94%   Weight: 71.4 kg (157 lb 8 oz)   Height: 158 cm (62.21\")   PainSc: 0-No pain     Current Status 8/10/2022   ECOG score 1       Physical Exam    PThis patient's ACP documentation is up to date, and there's nothing further left to document.      CONSTITUTIONAL:  Vital signs reviewed.  No distress, looks comfortable.  EYES:  Conjunctivae and lids unremarkable.  " PERRLA  EARS,NOSE,MOUTH,THROAT:  Ears and nose appear unremarkable.  Lips, teeth, gums appear unremarkable.  RESPIRATORY:  Normal respiratory effort.  Lungs clear to auscultation bilaterally.  CARDIOVASCULAR:  Normal S1, S2.  No murmurs rubs or gallops.  No significant lower extremity edema.  GASTROINTESTINAL: Abdomen appears unremarkable.  Nontender.  No hepatomegaly.  No splenomegaly.  LYMPHATIC:  No cervical, supraclavicular, axillary lymphadenopathy.  SKIN:  Warm.  No rashes.    Physical exam unchanged as listed above, 8/10/2022       RECENT LABS:  Hematology WBC   Date Value Ref Range Status   08/10/2022 3.03 (L) 3.40 - 10.80 10*3/mm3 Final     RBC   Date Value Ref Range Status   08/10/2022 4.51 3.77 - 5.28 10*6/mm3 Final     Hemoglobin   Date Value Ref Range Status   08/10/2022 13.3 12.0 - 15.9 g/dL Final     Hematocrit   Date Value Ref Range Status   08/10/2022 39.9 34.0 - 46.6 % Final     Platelets   Date Value Ref Range Status   08/10/2022 70 (L) 140 - 450 10*3/mm3 Final          Assessment & Plan     .1.   Chronic pancytopenia, hemoglobin improved now after patient underwent coagulation of angiodysplasia of the ascending colon. However the white count and the platelet count are still low. I do not believe any of her medications caused her counts to be low. Looking at all labs patient has cirrhosis of the liver with splenomegaly as the cause for her pancytopenia.  Her labs are all negative except elevated rheumatoid factor and elevated and the scleroderma antibodies.  I'm unsure if there is an autoimmune component for her chronic pancytopenia.  We will refer her to both gastroenterology to evaluate the cause of her cirrhosis as she does not drink alcohol and we will also refer her to rheumatology 2 be sure there is no underlying autoimmune disease as the cause of her low counts.        Patient has not alcoholic steatohepatitis.  But stable pancytopenia.    August 2022, blood counts remain stable    2.   History of MI in 2016 thought to be secondary to GI bleed    3.  Patient had GI bleed in 2016 and received angiodysplasia but a month later in 2019 had to be cauterized for angiodysplasia again     Plan:     · CBC, CMP, ferritin, iron studies review today   · Thrombocytopenia remains stable - platelets 70, 000  · No active signs of bleeding  · Her thrombocytopenia and leukopenia is chronic and labs stable baseline normal  · Will follow patient in 2 months with labs and see MD in 4 months      YUSEF Sousa Dr, makk

## 2022-10-05 ENCOUNTER — CLINICAL SUPPORT (OUTPATIENT)
Dept: ONCOLOGY | Facility: HOSPITAL | Age: 87
End: 2022-10-05

## 2022-10-05 ENCOUNTER — LAB (OUTPATIENT)
Dept: OTHER | Facility: HOSPITAL | Age: 87
End: 2022-10-05

## 2022-10-05 DIAGNOSIS — D50.9 IRON DEFICIENCY ANEMIA, UNSPECIFIED IRON DEFICIENCY ANEMIA TYPE: ICD-10-CM

## 2022-10-05 LAB
ALBUMIN SERPL-MCNC: 3.7 G/DL (ref 3.5–5.2)
ALBUMIN/GLOB SERPL: 1.4 G/DL
ALP SERPL-CCNC: 117 U/L (ref 39–117)
ALT SERPL W P-5'-P-CCNC: 26 U/L (ref 1–33)
ANION GAP SERPL CALCULATED.3IONS-SCNC: 6.6 MMOL/L (ref 5–15)
AST SERPL-CCNC: 45 U/L (ref 1–32)
BASOPHILS # BLD AUTO: 0.02 10*3/MM3 (ref 0–0.2)
BASOPHILS NFR BLD AUTO: 0.6 % (ref 0–1.5)
BILIRUB SERPL-MCNC: 0.8 MG/DL (ref 0–1.2)
BUN SERPL-MCNC: 13 MG/DL (ref 8–23)
BUN/CREAT SERPL: 14.9 (ref 7–25)
CALCIUM SPEC-SCNC: 9.5 MG/DL (ref 8.6–10.5)
CHLORIDE SERPL-SCNC: 106 MMOL/L (ref 98–107)
CO2 SERPL-SCNC: 26.4 MMOL/L (ref 22–29)
CREAT SERPL-MCNC: 0.87 MG/DL (ref 0.57–1)
DEPRECATED RDW RBC AUTO: 51.2 FL (ref 37–54)
EGFRCR SERPLBLD CKD-EPI 2021: 65.4 ML/MIN/1.73
EOSINOPHIL # BLD AUTO: 0.07 10*3/MM3 (ref 0–0.4)
EOSINOPHIL NFR BLD AUTO: 2.2 % (ref 0.3–6.2)
ERYTHROCYTE [DISTWIDTH] IN BLOOD BY AUTOMATED COUNT: 15.7 % (ref 12.3–15.4)
FERRITIN SERPL-MCNC: 92.4 NG/ML (ref 13–150)
GLOBULIN UR ELPH-MCNC: 2.6 GM/DL
GLUCOSE SERPL-MCNC: 171 MG/DL (ref 65–99)
HCT VFR BLD AUTO: 37.4 % (ref 34–46.6)
HGB BLD-MCNC: 12.2 G/DL (ref 12–15.9)
IMM GRANULOCYTES # BLD AUTO: 0.01 10*3/MM3 (ref 0–0.05)
IMM GRANULOCYTES NFR BLD AUTO: 0.3 % (ref 0–0.5)
IRON 24H UR-MRATE: 72 MCG/DL (ref 37–145)
IRON SATN MFR SERPL: 20 % (ref 20–50)
LYMPHOCYTES # BLD AUTO: 0.74 10*3/MM3 (ref 0.7–3.1)
LYMPHOCYTES NFR BLD AUTO: 23 % (ref 19.6–45.3)
MCH RBC QN AUTO: 29.3 PG (ref 26.6–33)
MCHC RBC AUTO-ENTMCNC: 32.6 G/DL (ref 31.5–35.7)
MCV RBC AUTO: 89.7 FL (ref 79–97)
MONOCYTES # BLD AUTO: 0.31 10*3/MM3 (ref 0.1–0.9)
MONOCYTES NFR BLD AUTO: 9.6 % (ref 5–12)
NEUTROPHILS NFR BLD AUTO: 2.07 10*3/MM3 (ref 1.7–7)
NEUTROPHILS NFR BLD AUTO: 64.3 % (ref 42.7–76)
NRBC BLD AUTO-RTO: 0 /100 WBC (ref 0–0.2)
PLATELET # BLD AUTO: 74 10*3/MM3 (ref 140–450)
PMV BLD AUTO: 12.4 FL (ref 6–12)
POTASSIUM SERPL-SCNC: 4.2 MMOL/L (ref 3.5–5.2)
PROT SERPL-MCNC: 6.3 G/DL (ref 6–8.5)
RBC # BLD AUTO: 4.17 10*6/MM3 (ref 3.77–5.28)
SODIUM SERPL-SCNC: 139 MMOL/L (ref 136–145)
TIBC SERPL-MCNC: 359 MCG/DL (ref 298–536)
TRANSFERRIN SERPL-MCNC: 241 MG/DL (ref 200–360)
WBC NRBC COR # BLD: 3.22 10*3/MM3 (ref 3.4–10.8)

## 2022-10-05 PROCEDURE — 80053 COMPREHEN METABOLIC PANEL: CPT | Performed by: NURSE PRACTITIONER

## 2022-10-05 PROCEDURE — G0463 HOSPITAL OUTPT CLINIC VISIT: HCPCS

## 2022-10-05 PROCEDURE — 85025 COMPLETE CBC W/AUTO DIFF WBC: CPT | Performed by: NURSE PRACTITIONER

## 2022-10-05 PROCEDURE — 84466 ASSAY OF TRANSFERRIN: CPT | Performed by: NURSE PRACTITIONER

## 2022-10-05 PROCEDURE — 83540 ASSAY OF IRON: CPT | Performed by: NURSE PRACTITIONER

## 2022-10-05 PROCEDURE — 36415 COLL VENOUS BLD VENIPUNCTURE: CPT

## 2022-10-05 PROCEDURE — 82728 ASSAY OF FERRITIN: CPT | Performed by: NURSE PRACTITIONER

## 2022-10-05 NOTE — NURSING NOTE
Patient is here for lab review with RN.  CBC reviewed with YUSEF Skaggs, counts are stable for this patient at this time and no new orders per Luci. Patient has no complaints. Copy of labs given to patient and follow up appointment reviewed. Will reach out to patient with results of iron panel and CMP. Patient is instructed to call the office with any concerns or new symptoms prior to next visit. Patient verbalized understanding and discharged in stable condition.    Lab Results   Component Value Date    WBC 3.22 (L) 10/05/2022    HGB 12.2 10/05/2022    HCT 37.4 10/05/2022    MCV 89.7 10/05/2022    PLT 74 (L) 10/05/2022       Called pt's son at 1514 with results of CMP and iron labs after reviewed with Luci. Relayed that all labs are stable at this time and reminded him to call the office should he have any questions or concerns prior to next appointment in November. He vu.    Lab Results   Component Value Date    GLUCOSE 171 (H) 10/05/2022    BUN 13 10/05/2022    CREATININE 0.87 10/05/2022    EGFRIFNONA 62 05/06/2021    EGFRIFAFRI 65 11/26/2018    BCR 14.9 10/05/2022    K 4.2 10/05/2022    CO2 26.4 10/05/2022    CALCIUM 9.5 10/05/2022    PROTENTOTREF 6.6 11/26/2018    ALBUMIN 3.70 10/05/2022    LABIL2 1.4 11/26/2018    AST 45 (H) 10/05/2022    ALT 26 10/05/2022     Lab Results   Component Value Date    IRON 72 10/05/2022    TIBC 359 10/05/2022    FERRITIN 92.40 10/05/2022     Iron saturation 20%

## 2022-11-28 RX ORDER — LISINOPRIL 5 MG/1
5 TABLET ORAL DAILY
Qty: 90 TABLET | Refills: 1 | Status: SHIPPED | OUTPATIENT
Start: 2022-11-28

## 2022-11-30 ENCOUNTER — APPOINTMENT (OUTPATIENT)
Dept: OTHER | Facility: HOSPITAL | Age: 87
End: 2022-11-30

## 2022-12-21 ENCOUNTER — OFFICE VISIT (OUTPATIENT)
Dept: ONCOLOGY | Facility: CLINIC | Age: 87
End: 2022-12-21

## 2022-12-21 ENCOUNTER — LAB (OUTPATIENT)
Dept: OTHER | Facility: HOSPITAL | Age: 87
End: 2022-12-21

## 2022-12-21 VITALS
DIASTOLIC BLOOD PRESSURE: 68 MMHG | HEIGHT: 62 IN | RESPIRATION RATE: 16 BRPM | BODY MASS INDEX: 27.82 KG/M2 | WEIGHT: 151.2 LBS | TEMPERATURE: 97.1 F | OXYGEN SATURATION: 96 % | SYSTOLIC BLOOD PRESSURE: 124 MMHG | HEART RATE: 89 BPM

## 2022-12-21 DIAGNOSIS — D50.9 IRON DEFICIENCY ANEMIA, UNSPECIFIED IRON DEFICIENCY ANEMIA TYPE: ICD-10-CM

## 2022-12-21 DIAGNOSIS — D50.9 IRON DEFICIENCY ANEMIA, UNSPECIFIED IRON DEFICIENCY ANEMIA TYPE: Primary | ICD-10-CM

## 2022-12-21 DIAGNOSIS — D61.818 PANCYTOPENIA: ICD-10-CM

## 2022-12-21 LAB
ALBUMIN SERPL-MCNC: 3.7 G/DL (ref 3.5–5.2)
ALBUMIN/GLOB SERPL: 1.2 G/DL
ALP SERPL-CCNC: 100 U/L (ref 39–117)
ALT SERPL W P-5'-P-CCNC: 25 U/L (ref 1–33)
ANION GAP SERPL CALCULATED.3IONS-SCNC: 8.7 MMOL/L (ref 5–15)
AST SERPL-CCNC: 42 U/L (ref 1–32)
BASOPHILS # BLD AUTO: 0.03 10*3/MM3 (ref 0–0.2)
BASOPHILS NFR BLD AUTO: 0.8 % (ref 0–1.5)
BILIRUB SERPL-MCNC: 1.2 MG/DL (ref 0–1.2)
BUN SERPL-MCNC: 20 MG/DL (ref 8–23)
BUN/CREAT SERPL: 21.1 (ref 7–25)
CALCIUM SPEC-SCNC: 10 MG/DL (ref 8.6–10.5)
CHLORIDE SERPL-SCNC: 106 MMOL/L (ref 98–107)
CO2 SERPL-SCNC: 27.3 MMOL/L (ref 22–29)
CREAT SERPL-MCNC: 0.95 MG/DL (ref 0.57–1)
DEPRECATED RDW RBC AUTO: 48.8 FL (ref 37–54)
EGFRCR SERPLBLD CKD-EPI 2021: 58.5 ML/MIN/1.73
EOSINOPHIL # BLD AUTO: 0.1 10*3/MM3 (ref 0–0.4)
EOSINOPHIL NFR BLD AUTO: 2.6 % (ref 0.3–6.2)
ERYTHROCYTE [DISTWIDTH] IN BLOOD BY AUTOMATED COUNT: 14.9 % (ref 12.3–15.4)
FERRITIN SERPL-MCNC: 102.9 NG/ML (ref 13–150)
GLOBULIN UR ELPH-MCNC: 3 GM/DL
GLUCOSE SERPL-MCNC: 120 MG/DL (ref 65–99)
HCT VFR BLD AUTO: 40.4 % (ref 34–46.6)
HGB BLD-MCNC: 13.2 G/DL (ref 12–15.9)
IMM GRANULOCYTES # BLD AUTO: 0.01 10*3/MM3 (ref 0–0.05)
IMM GRANULOCYTES NFR BLD AUTO: 0.3 % (ref 0–0.5)
IRON 24H UR-MRATE: 150 MCG/DL (ref 37–145)
IRON SATN MFR SERPL: 40 % (ref 20–50)
LYMPHOCYTES # BLD AUTO: 0.89 10*3/MM3 (ref 0.7–3.1)
LYMPHOCYTES NFR BLD AUTO: 23.1 % (ref 19.6–45.3)
MCH RBC QN AUTO: 29.3 PG (ref 26.6–33)
MCHC RBC AUTO-ENTMCNC: 32.7 G/DL (ref 31.5–35.7)
MCV RBC AUTO: 89.6 FL (ref 79–97)
MONOCYTES # BLD AUTO: 0.33 10*3/MM3 (ref 0.1–0.9)
MONOCYTES NFR BLD AUTO: 8.5 % (ref 5–12)
NEUTROPHILS NFR BLD AUTO: 2.5 10*3/MM3 (ref 1.7–7)
NEUTROPHILS NFR BLD AUTO: 64.7 % (ref 42.7–76)
NRBC BLD AUTO-RTO: 0 /100 WBC (ref 0–0.2)
PLATELET # BLD AUTO: 85 10*3/MM3 (ref 140–450)
PMV BLD AUTO: 11.9 FL (ref 6–12)
POTASSIUM SERPL-SCNC: 4.2 MMOL/L (ref 3.5–5.2)
PROT SERPL-MCNC: 6.7 G/DL (ref 6–8.5)
RBC # BLD AUTO: 4.51 10*6/MM3 (ref 3.77–5.28)
SODIUM SERPL-SCNC: 142 MMOL/L (ref 136–145)
TIBC SERPL-MCNC: 375 MCG/DL (ref 298–536)
TRANSFERRIN SERPL-MCNC: 252 MG/DL (ref 200–360)
WBC NRBC COR # BLD: 3.86 10*3/MM3 (ref 3.4–10.8)

## 2022-12-21 PROCEDURE — 83540 ASSAY OF IRON: CPT | Performed by: INTERNAL MEDICINE

## 2022-12-21 PROCEDURE — 84466 ASSAY OF TRANSFERRIN: CPT | Performed by: INTERNAL MEDICINE

## 2022-12-21 PROCEDURE — 99213 OFFICE O/P EST LOW 20 MIN: CPT | Performed by: INTERNAL MEDICINE

## 2022-12-21 PROCEDURE — 85025 COMPLETE CBC W/AUTO DIFF WBC: CPT | Performed by: INTERNAL MEDICINE

## 2022-12-21 PROCEDURE — 80053 COMPREHEN METABOLIC PANEL: CPT | Performed by: INTERNAL MEDICINE

## 2022-12-21 PROCEDURE — 82728 ASSAY OF FERRITIN: CPT | Performed by: INTERNAL MEDICINE

## 2022-12-21 PROCEDURE — 36415 COLL VENOUS BLD VENIPUNCTURE: CPT

## 2022-12-23 PROBLEM — D61.818 PANCYTOPENIA: Status: ACTIVE | Noted: 2017-06-19

## 2023-02-22 ENCOUNTER — LAB (OUTPATIENT)
Dept: OTHER | Facility: HOSPITAL | Age: 88
End: 2023-02-22
Payer: MEDICARE

## 2023-02-22 ENCOUNTER — OFFICE VISIT (OUTPATIENT)
Dept: ONCOLOGY | Facility: CLINIC | Age: 88
End: 2023-02-22
Payer: MEDICARE

## 2023-02-22 VITALS
SYSTOLIC BLOOD PRESSURE: 121 MMHG | BODY MASS INDEX: 27.84 KG/M2 | RESPIRATION RATE: 16 BRPM | TEMPERATURE: 98.4 F | HEART RATE: 65 BPM | HEIGHT: 62 IN | OXYGEN SATURATION: 96 % | DIASTOLIC BLOOD PRESSURE: 62 MMHG | WEIGHT: 151.3 LBS

## 2023-02-22 DIAGNOSIS — D50.9 IRON DEFICIENCY ANEMIA, UNSPECIFIED IRON DEFICIENCY ANEMIA TYPE: ICD-10-CM

## 2023-02-22 DIAGNOSIS — D61.818 PANCYTOPENIA: ICD-10-CM

## 2023-02-22 DIAGNOSIS — D69.6 THROMBOCYTOPENIA: Primary | ICD-10-CM

## 2023-02-22 LAB
BASOPHILS # BLD AUTO: 0.02 10*3/MM3 (ref 0–0.2)
BASOPHILS NFR BLD AUTO: 0.6 % (ref 0–1.5)
DEPRECATED RDW RBC AUTO: 51.2 FL (ref 37–54)
EOSINOPHIL # BLD AUTO: 0.12 10*3/MM3 (ref 0–0.4)
EOSINOPHIL NFR BLD AUTO: 3.5 % (ref 0.3–6.2)
ERYTHROCYTE [DISTWIDTH] IN BLOOD BY AUTOMATED COUNT: 15.3 % (ref 12.3–15.4)
FERRITIN SERPL-MCNC: 98.2 NG/ML (ref 13–150)
HCT VFR BLD AUTO: 39.5 % (ref 34–46.6)
HGB BLD-MCNC: 12.8 G/DL (ref 12–15.9)
IMM GRANULOCYTES # BLD AUTO: 0.01 10*3/MM3 (ref 0–0.05)
IMM GRANULOCYTES NFR BLD AUTO: 0.3 % (ref 0–0.5)
IRON 24H UR-MRATE: 95 MCG/DL (ref 37–145)
IRON SATN MFR SERPL: 26 % (ref 20–50)
LYMPHOCYTES # BLD AUTO: 0.91 10*3/MM3 (ref 0.7–3.1)
LYMPHOCYTES NFR BLD AUTO: 26.2 % (ref 19.6–45.3)
MCH RBC QN AUTO: 29.2 PG (ref 26.6–33)
MCHC RBC AUTO-ENTMCNC: 32.4 G/DL (ref 31.5–35.7)
MCV RBC AUTO: 90 FL (ref 79–97)
MONOCYTES # BLD AUTO: 0.37 10*3/MM3 (ref 0.1–0.9)
MONOCYTES NFR BLD AUTO: 10.7 % (ref 5–12)
NEUTROPHILS NFR BLD AUTO: 2.04 10*3/MM3 (ref 1.7–7)
NEUTROPHILS NFR BLD AUTO: 58.7 % (ref 42.7–76)
NRBC BLD AUTO-RTO: 0 /100 WBC (ref 0–0.2)
PLATELET # BLD AUTO: 77 10*3/MM3 (ref 140–450)
PMV BLD AUTO: 11.8 FL (ref 6–12)
RBC # BLD AUTO: 4.39 10*6/MM3 (ref 3.77–5.28)
TIBC SERPL-MCNC: 367 MCG/DL (ref 298–536)
TRANSFERRIN SERPL-MCNC: 246 MG/DL (ref 200–360)
WBC NRBC COR # BLD: 3.47 10*3/MM3 (ref 3.4–10.8)

## 2023-02-22 PROCEDURE — 82728 ASSAY OF FERRITIN: CPT | Performed by: INTERNAL MEDICINE

## 2023-02-22 PROCEDURE — 84466 ASSAY OF TRANSFERRIN: CPT | Performed by: INTERNAL MEDICINE

## 2023-02-22 PROCEDURE — 36415 COLL VENOUS BLD VENIPUNCTURE: CPT

## 2023-02-22 PROCEDURE — 85025 COMPLETE CBC W/AUTO DIFF WBC: CPT | Performed by: INTERNAL MEDICINE

## 2023-02-22 PROCEDURE — 99214 OFFICE O/P EST MOD 30 MIN: CPT | Performed by: NURSE PRACTITIONER

## 2023-02-22 PROCEDURE — 83540 ASSAY OF IRON: CPT | Performed by: INTERNAL MEDICINE

## 2023-02-22 NOTE — PROGRESS NOTES
Subjective     REASON FOR follow-up:    1. 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  Patient has no history of alcohol use.    HISTORY OF PRESENT ILLNESS:   Patient is an 87-year-old female with the above-mentioned history returns today for follow-up and lab review.  She reports that she has been doing well since her last visit here.  She denies any issues with bleeding or excessive bruising.  She has no other new problems or concerns today.      Hematological history:  86-year-old female who had been seen by us back in 2018 and 2019 because of chronic leukopenia and thrombocytopenia.  She has history of cirrhosis of the liver with splenomegaly.  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  She follows up with Dr. Javier Yeboah.  She also had antiscleroderma antibodies positive and rheumatoid factor positive at which time we had asked her to be referred to rheumatology and gastroenterology.  She has been up-to-date with her colonoscopy and EGD.    Back in 2018 she had colonoscopy which showed multiple nonbleeding colonic angiodysplastic lesions requiring argon plasma coagulation.  EGD was negative.  They had suggested to avoid anticoagulant and enter platelet treatment.  And Dr. Martinez asked to hold the antiplatelet agents for a few weeks until she healed up.  Patient currently has no GI bleeding.  She had an MI back in 2016 and she is being referred here again because of thrombocytopenia.  She saw Dr. Nicholas May 2022 who referred here here again for thrombocytopenia.  He still wanted her to continue aspirin though patient states that she was asked to continue to discontinue.    Patient had non-STEMI in 2016.  She was found to have multiple colonic angiodysplastic lesions which required argon plasma coagulation.  Since then back in 2019 also she required argon plasma coagulation by Dr. Javier Peñaloza.    Her platelets 2 weeks ago, 5/16/2022 at 67 but 6/15/2022 it is 87.  I have reviewed  the peripheral smear today and it shows evidence of decreased platelets, no immature cells no evidence of metamyelocytes or myelocytes.  She does not have any hypochromia but has mild microcytosis.  She complains of fatigue and we will check B12 and iron studies.  She is stable since 2018 and her blood counts without evidence of any dropped further.      Past Medical History:   Diagnosis Date   • Anemia    • Cirrhosis, non-alcoholic ([facility])    • CKD (chronic kidney disease) stage 3, GFR 30-59 ml/min ([facility]) 1/24/2019   • Gallbladder stone without cholecystitis or obstruction     gallstone seen on ultrasound 2007   • GERD (gastroesophageal reflux disease)    • Health care maintenance    • Hyperlipidemia    • Hypertension    • Lumbar canal stenosis    • Lumbar radiculopathy    • MI (myocardial infarction) ([facility]) 12/25/2016    NON-STEMI   • NSTEMI (non-ST elevated myocardial infarction) ([facility])    • Osteoarthritis    • Thrombophlebitis of superficial veins of upper extremities     LEFT        Past Surgical History:   Procedure Laterality Date   • CARDIAC CATHETERIZATION N/A 3/1/2017    Procedure: Coronary angiography;  Surgeon: Desean Earl MD;  Location: [facility] CATH INVASIVE LOCATION;  Service:    • COLONOSCOPY N/A 12/27/2016    Procedure: COLONOSCOPY INTO CECUM WITH ARGON PLASMA COAGULATION;  Surgeon: Javier Peñaloza MD;  Location: [facility] ENDOSCOPY;  Service:    • ENDOSCOPY N/A 12/27/2016    Procedure: ESOPHAGOGASTRODUODENOSCOPY WITH COLD BIOPSIES;  Surgeon: Javier Peñaloza MD;  Location: [facility] ENDOSCOPY;  Service:    • HIP ARTHROPLASTY Right 04/01/2015   • HIP CLOSED REDUCTION Left 1/25/2019    Procedure: LT. HIP CLOSED REDUCTION;  Surgeon: Domenico Peñaloza MD;  Location: [facility] OR;  Service: Orthopedics   • HYSTERECTOMY  1986   • JOINT REPLACEMENT     • KNEE SURGERY Bilateral     2007 & 2008   • LUNG SURGERY  1965   • TONSILLECTOMY     • TOTAL HIP ARTHROPLASTY Left 1/23/2019    Procedure: LEFT TOTAL HIP  ARTHROPLASTY MARICRUZ NAVIGATION;  Surgeon: Domenico Peñaloza MD;  Location: [facility];  Service: Orthopedics        Current Outpatient Medications on File Prior to Visit   Medication Sig Dispense Refill   • atorvastatin (LIPITOR) 80 MG tablet TAKE 1 TABLET BY MOUTH EVERY NIGHT 90 tablet 3   • BIOTIN PO Take  by mouth Daily.     • Calcium Carbonate-Vit D-Min (CALCIUM 1200 PO) Take  by mouth 2 (Two) Times a Day.     • carvedilol (COREG) 3.125 MG tablet TAKE 1 TABLET BY MOUTH EVERY 12 HOURS 180 tablet 3   • Cholecalciferol (VITAMIN D3) 125 MCG (5000 UT) capsule capsule Take 5,000 Units by mouth Daily.     • lisinopril (PRINIVIL,ZESTRIL) 5 MG tablet TAKE 1 TABLET BY MOUTH DAILY 90 tablet 1   • Multiple Vitamin (MULTI VITAMIN DAILY PO) Take  by mouth Daily. Centrum silver     • pantoprazole (PROTONIX) 40 MG EC tablet TAKE 1 TABLET BY MOUTH DAILY 30 tablet 5   • VITAMIN E PO Take  by mouth Daily.       No current facility-administered medications on file prior to visit.        ALLERGIES:  No Known Allergies     Social History     Socioeconomic History   • Marital status:    • Years of education: College   Tobacco Use   • Smoking status: Never   • Smokeless tobacco: Never   • Tobacco comments:     occ caffeine use   Substance and Sexual Activity   • Alcohol use: No   • Drug use: No   • Sexual activity: Defer        Family History   Problem Relation Age of Onset   • Hypertension Other    • Heart disease Other    • Liver cancer Brother    • Ovarian cancer Daughter         diagnosed 2012   • No Known Problems Mother    • Heart disease Father    • Heart attack Father    • Hypertension Father    • Cancer Maternal Grandmother    • No Known Problems Maternal Grandfather    • No Known Problems Paternal Grandmother    • No Known Problems Paternal Grandfather    • Malig Hyperthermia Neg Hx         Review of Systems   ROS as per HPI    Objective     Vitals:    02/22/23 1517   BP: 121/62   Pulse: 65   Resp: 16   Temp: 98.4  "°F (36.9 °C)   TempSrc: Temporal   SpO2: 96%   Weight: 68.6 kg (151 lb 4.8 oz)   Height: 158 cm (62.21\")   PainSc: 0-No pain     Current Status 2/22/2023   ECOG score 2       Physical Exam  Vitals reviewed.   Constitutional:       General: She is not in acute distress.     Appearance: Normal appearance. She is well-developed.   HENT:      Head: Normocephalic and atraumatic.   Eyes:      Pupils: Pupils are equal, round, and reactive to light.   Cardiovascular:      Rate and Rhythm: Normal rate and regular rhythm.      Heart sounds: Normal heart sounds. No murmur heard.  Pulmonary:      Effort: Pulmonary effort is normal. No respiratory distress.      Breath sounds: Normal breath sounds. No wheezing, rhonchi or rales.   Abdominal:      General: Bowel sounds are normal. There is no distension.      Palpations: Abdomen is soft.   Musculoskeletal:         General: Normal range of motion.      Cervical back: Normal range of motion.   Skin:     General: Skin is warm and dry.      Findings: No rash.   Neurological:      Mental Status: She is alert and oriented to person, place, and time.         This patient's ACP documentation is up to date, and there's nothing further left to document.      RECENT LABS:  Hematology WBC   Date Value Ref Range Status   02/22/2023 3.47 3.40 - 10.80 10*3/mm3 Final     RBC   Date Value Ref Range Status   02/22/2023 4.39 3.77 - 5.28 10*6/mm3 Final     Hemoglobin   Date Value Ref Range Status   02/22/2023 12.8 12.0 - 15.9 g/dL Final     Hematocrit   Date Value Ref Range Status   02/22/2023 39.5 34.0 - 46.6 % Final     Platelets   Date Value Ref Range Status   02/22/2023 77 (L) 140 - 450 10*3/mm3 Final          Assessment & Plan     1. Chronic pancytopenia, secondary to cirrhosis secondary to GAFFNEY with splenomegaly.  · Hemoglobin improved now after patient underwent coagulation of angiodysplasia of the ascending colon. However the white count and the platelet count are still low. I do not believe " any of her medications caused her counts to be low. Looking at all labs patient has cirrhosis of the liver with splenomegaly as the cause for her pancytopenia.  Her labs are all negative except elevated rheumatoid factor and elevated and the scleroderma antibodies.  I'm unsure if there is an autoimmune component for her chronic pancytopenia.  We will refer her to both gastroenterology to evaluate the cause of her cirrhosis as she does not drink alcohol and we will also refer her to rheumatology 2 be sure there is no underlying autoimmune disease as the cause of her low counts.  · Patient has not alcoholic steatohepatitis.  But stable pancytopenia.  · Platelets stable at 85 12/21/2022.  · 2/22/2023 platelet count is 77,000.  WBC 3.47, ANC 2.04, hemoglobin 12.8.      2.  History of MI in 2016 thought to be secondary to GI bleed    3.  Patient had GI bleed in 2016 and received angiodysplasia but a month later in 2019 had to be cauterized for angiodysplasia again.       Plan:   1. Return in 2 months with repeat CBC, CMP, NP evaluation.  2. Return in 4 months with repeat labs reevaluation by Dr. Gutiérrez.  3. Call/ return sooner should he develop any new concerns or problems.        YUSEF [address] Dr, makk

## 2023-03-14 NOTE — PROGRESS NOTES
Subjective     REASON FOR follow-up:  1.  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  Patient has no history of alcohol use.    HISTORY OF PRESENT ILLNESS:     Patient is 87-year-old female with history of cirrhosis of the liver in the past with primarily and thrombocytopenia and leukopenia who is being followed here on a routine basis.  Patient's platelets are stable at 85.  She is asymptomatic.  Her son is with her today.  He wants us to keep a watch on her counts.  She is asymptomatic        Hematological history:  patient is a 86-year-old female who had been seen by us back in 2018 and 2019 because of chronic leukopenia and thrombocytopenia.  She has history of cirrhosis of the liver with splenomegaly.  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  She follows up with Dr. Javier Yeboah.  She also had antiscleroderma antibodies positive and rheumatoid factor positive at which time we had asked her to be referred to rheumatology and gastroenterology.  She has been up-to-date with her colonoscopy and EGD.    Back in 2018 she had colonoscopy which showed multiple nonbleeding colonic angiodysplastic lesions requiring argon plasma coagulation.  EGD was negative.  They had suggested to avoid anticoagulant and enter platelet treatment.  And Dr. Martinez asked to hold the antiplatelet agents for a few weeks until she healed up.  Patient currently has no GI bleeding.  She had an MI back in 2016 and she is being referred here again because of thrombocytopenia.  She saw Dr. Nicholas May 2022 who referred here here again for thrombocytopenia.  He still wanted her to continue aspirin though patient states that she was asked to continue to discontinue.    Patient had non-ST RICKY in 2016.  She was found to have multiple colonic angiodysplastic lesions which required argon plasma coagulation.  Since then back in 2019 also she required argon plasma coagulation by Dr. Javier Peñaloza.    Her platelets 2 weeks  EKG tracing was faxed to number listed [phone].   "ago at 67 but today it is 87.  I have reviewed the peripheral smear today and it shows evidence of decreased platelets, no immature cells no evidence of metamyelocytes or myelocytes.  She does not have any hypochromia but has mild microcytosis.  She complains of fatigue and we will check B12 and iron studies.  She is stable since 2018 and her blood counts without evidence of any dropped further    INTERVAL HISTORY:   Patient returns the office today for 2-month follow-up and evaluation of her chronic pancytopenia.  Overall, patient is doing very well.  She denies bleeding.  Patient states she lives alone in a patio home. She denies shortness of breath or worsening fatigue. She denies new concerns today.       ONCOLOGIC HISTORY:  (History from previous dates can be found in the separate document.)   patient is a 81-year-old female who was referred here for evaluation of thrombus cytopenia. She has a history of hypertension and hyperlipidemia.. Her CBC her hemoglobin was 13.0 white count of 3.56 platelet of 73 on May 31, 2017. Looking back even in December 2016 her hemoglobin was 9.2 white count of 7.57\" platelet of 98. She did drop her hemoglobin to 7.9. Today her hemoglobin is back up to 13.8. She has no complaints. She feels reasonably good and is active. She is not on any medications that can affect her platelet count. She is been on Plendil 4 years and we will check if Plendil can cause low platelet count and low white count. Rest of the drugs do not seem to affect her platelet count. She has no active bleeding. Patient underwent EGD and colonoscopy by Dr. Yeboah and apparently underwent EGD which showed chronic gastritis in December 2016. Colonoscopy showed evidence of multiple angiodysplasias in the ascending colon for which he patient underwent coagulation. Since then patient has not had GI bleeding. Patient also had a non-ST elevation myocardial infarction in March 2017. Which patient is on treatment with " medications. Patient has some chronic fatigue otherwise feeling reasonably well. She has lost significant amount of weight in the last 6 months.     Chronic pancytopenia, hemoglobin improved now after patient underwent coagulation of angiodysplasia of the ascending colon. However the white count and the platelet count are still low. I do not believe any of her medications caused her counts to be low. Certainly we can obtain a B12, RBC folate, MIRANDA and rheumatoid factor as well as C-reactive protein and ESR. She'll also obtain a flow cytometry. On examination of the peripheral smear there is decreased platelet counts, there is no evidence of platelet clumps, no evidence of immature cells including blasts. I do not appreciate a splenomegaly on examination. We did discuss about obtaining a bone marrow aspiration and biopsy to rule out underlying myelodysplasia. Patient does not want to go through         Past Medical History:   Diagnosis Date   • Anemia    • Cirrhosis, non-alcoholic (HCC)    • CKD (chronic kidney disease) stage 3, GFR 30-59 ml/min ([facility]) 1/24/2019   • Gallbladder stone without cholecystitis or obstruction     gallstone seen on ultrasound 2007   • GERD (gastroesophageal reflux disease)    • Health care maintenance    • Hyperlipidemia    • Hypertension    • Lumbar canal stenosis    • Lumbar radiculopathy    • MI (myocardial infarction) ([facility]) 12/25/2016    NON-STEMI   • NSTEMI (non-ST elevated myocardial infarction) ([facility])    • Osteoarthritis    • Thrombophlebitis of superficial veins of upper extremities     LEFT        Past Surgical History:   Procedure Laterality Date   • CARDIAC CATHETERIZATION N/A 3/1/2017    Procedure: Coronary angiography;  Surgeon: Desean Earl MD;  Location: [facility] CATH INVASIVE LOCATION;  Service:    • COLONOSCOPY N/A 12/27/2016    Procedure: COLONOSCOPY INTO CECUM WITH ARGON PLASMA COAGULATION;  Surgeon: Javier Peñaloza MD;  Location: [facility] ENDOSCOPY;  Service:    •  ENDOSCOPY N/A 12/27/2016    Procedure: ESOPHAGOGASTRODUODENOSCOPY WITH COLD BIOPSIES;  Surgeon: Javier Peñaloza MD;  Location: [facility] ENDOSCOPY;  Service:    • HIP ARTHROPLASTY Right 04/01/2015   • HIP CLOSED REDUCTION Left 1/25/2019    Procedure: LT. HIP CLOSED REDUCTION;  Surgeon: Domenico Peñaloza MD;  Location: [facility] MAIN OR;  Service: Orthopedics   • HYSTERECTOMY  1986   • JOINT REPLACEMENT     • KNEE SURGERY Bilateral     2007 & 2008   • LUNG SURGERY  1965   • TONSILLECTOMY     • TOTAL HIP ARTHROPLASTY Left 1/23/2019    Procedure: LEFT TOTAL HIP ARTHROPLASTY MARICRUZ NAVIGATION;  Surgeon: Domenico Peñaloza MD;  Location: [facility] OR;  Service: Orthopedics        Current Outpatient Medications on File Prior to Visit   Medication Sig Dispense Refill   • atorvastatin (LIPITOR) 80 MG tablet TAKE 1 TABLET BY MOUTH EVERY NIGHT 90 tablet 3   • BIOTIN PO Take  by mouth Daily.     • Calcium Carbonate-Vit D-Min (CALCIUM 1200 PO) Take  by mouth 2 (Two) Times a Day.     • carvedilol (COREG) 3.125 MG tablet TAKE 1 TABLET BY MOUTH EVERY 12 HOURS 180 tablet 3   • Cholecalciferol (VITAMIN D3) 125 MCG (5000 UT) capsule capsule Take 5,000 Units by mouth Daily.     • lisinopril (PRINIVIL,ZESTRIL) 5 MG tablet TAKE 1 TABLET BY MOUTH DAILY 90 tablet 1   • Multiple Vitamin (MULTI VITAMIN DAILY PO) Take  by mouth Daily. Centrum silver     • pantoprazole (PROTONIX) 40 MG EC tablet TAKE 1 TABLET BY MOUTH DAILY 30 tablet 5   • VITAMIN E PO Take  by mouth Daily.       No current facility-administered medications on file prior to visit.        ALLERGIES:  No Known Allergies     Social History     Socioeconomic History   • Marital status:    • Years of education: College   Tobacco Use   • Smoking status: Never   • Smokeless tobacco: Never   • Tobacco comments:     occ caffeine use   Substance and Sexual Activity   • Alcohol use: No   • Drug use: No   • Sexual activity: Defer        Family History   Problem Relation Age of Onset   •  "Hypertension Other    • Heart disease Other    • Liver cancer Brother    • Ovarian cancer Daughter         diagnosed 2012   • No Known Problems Mother    • Heart disease Father    • Heart attack Father    • Hypertension Father    • Cancer Maternal Grandmother    • No Known Problems Maternal Grandfather    • No Known Problems Paternal Grandmother    • No Known Problems Paternal Grandfather    • Malig Hyperthermia Neg Hx         Review of Systems   Constitutional: Positive for fatigue. Negative for appetite change, chills, diaphoresis, fever and unexpected weight change.   HENT: Negative for hearing loss, sore throat and trouble swallowing.    Respiratory: Negative for cough, chest tightness, shortness of breath and wheezing.    Cardiovascular: Negative for chest pain, palpitations and leg swelling.   Gastrointestinal: Negative for abdominal distention, abdominal pain, constipation, diarrhea, nausea and vomiting.   Genitourinary: Negative for dysuria, frequency, hematuria and urgency.   Musculoskeletal: Negative for joint swelling.        No muscle weakness.   Skin: Negative for rash and wound.   Neurological: Negative for seizures, syncope, speech difficulty, weakness, numbness and headaches.   Hematological: Negative for adenopathy. Does not bruise/bleed easily.   Psychiatric/Behavioral: Negative for behavioral problems, confusion and suicidal ideas.   All other systems reviewed and are negative.     I have reviewed and confirmed the accuracy of the ROS as documented by the MA/LPN/RN Leyla Gutiérrez MD      Objective     Vitals:    12/21/22 1627   BP: 124/68   Pulse: 89   Resp: 16   Temp: 97.1 °F (36.2 °C)   TempSrc: Temporal   SpO2: 96%   Weight: 68.6 kg (151 lb 3.2 oz)   Height: 158 cm (62.21\")   PainSc: 0-No pain     Current Status 12/21/2022   ECOG score 1       Physical Exam    PThis patient's ACP documentation is up to date, and there's nothing further left to document.      CONSTITUTIONAL:  Vital signs " reviewed.  No distress, looks comfortable.  EYES:  Conjunctivae and lids unremarkable.  PERRLA  EARS,NOSE,MOUTH,THROAT:  Ears and nose appear unremarkable.  Lips, teeth, gums appear unremarkable.  RESPIRATORY:  Normal respiratory effort.  Lungs clear to auscultation bilaterally.  CARDIOVASCULAR:  Normal S1, S2.  No murmurs rubs or gallops.  No significant lower extremity edema.  GASTROINTESTINAL: Abdomen appears unremarkable.  Nontender.  No hepatomegaly.  No splenomegaly.  LYMPHATIC:  No cervical, supraclavicular, axillary lymphadenopathy.  SKIN:  Warm.  No rashes.    I have reexamined the patient and the results are consistent with the previously documented exam. Leyla Gutiérrez MD          RECENT LABS:  Hematology WBC   Date Value Ref Range Status   12/21/2022 3.86 3.40 - 10.80 10*3/mm3 Final     RBC   Date Value Ref Range Status   12/21/2022 4.51 3.77 - 5.28 10*6/mm3 Final     Hemoglobin   Date Value Ref Range Status   12/21/2022 13.2 12.0 - 15.9 g/dL Final     Hematocrit   Date Value Ref Range Status   12/21/2022 40.4 34.0 - 46.6 % Final     Platelets   Date Value Ref Range Status   12/21/2022 85 (L) 140 - 450 10*3/mm3 Final          Assessment & Plan     .1.   Chronic pancytopenia, secondary to cirrhosis secondary to GAFFNEY with splenomegaly,  ·  hemoglobin improved now after patient underwent coagulation of angiodysplasia of the ascending colon. However the white count and the platelet count are still low. I do not believe any of her medications caused her counts to be low. Looking at all labs patient has cirrhosis of the liver with splenomegaly as the cause for her pancytopenia.  Her labs are all negative except elevated rheumatoid factor and elevated and the scleroderma antibodies.  I'm unsure if there is an autoimmune component for her chronic pancytopenia.  We will refer her to both gastroenterology to evaluate the cause of her cirrhosis as she does not drink alcohol and we will also refer her to  rheumatology 2 be sure there is no underlying autoimmune disease as the cause of her low counts.        · Patient has not alcoholic steatohepatitis.  But stable pancytopenia.  · Platelets stable at 85 today      2.  History of MI in 2016 thought to be secondary to GI bleed    3.  Patient had GI bleed in 2016 and received angiodysplasia but a month later in 2019 had to be cauterized for angiodysplasia again     Plan:   · Currently platelets are stable at 85  · Patient has chronic leukopenia and thrombocytopenia secondary to splenomegaly secondary to Ramos and cirrhosis  · Follow-up in 2 months appointment protraction and labs  · Follow-up in 6 months with me with labs  · MD Dr.Semdur Dr Neetu Blankenship makk

## 2023-04-18 RX ORDER — ATORVASTATIN CALCIUM 80 MG/1
80 TABLET, FILM COATED ORAL NIGHTLY
Qty: 90 TABLET | Refills: 3 | Status: SHIPPED | OUTPATIENT
Start: 2023-04-18

## 2023-05-30 RX ORDER — CARVEDILOL 3.12 MG/1
3.12 TABLET ORAL EVERY 12 HOURS
Qty: 180 TABLET | Refills: 0 | Status: SHIPPED | OUTPATIENT
Start: 2023-05-30

## 2023-11-28 RX ORDER — LISINOPRIL 5 MG/1
5 TABLET ORAL DAILY
Qty: 90 TABLET | Refills: 0 | Status: SHIPPED | OUTPATIENT
Start: 2023-11-28

## 2024-01-01 ENCOUNTER — APPOINTMENT (OUTPATIENT)
Dept: CARDIOLOGY | Facility: HOSPITAL | Age: 89
DRG: 283 | End: 2024-01-01
Payer: MEDICARE

## 2024-01-01 ENCOUNTER — APPOINTMENT (OUTPATIENT)
Dept: CT IMAGING | Facility: HOSPITAL | Age: 89
DRG: 283 | End: 2024-01-01
Payer: MEDICARE

## 2024-01-01 ENCOUNTER — TELEPHONE (OUTPATIENT)
Dept: FAMILY MEDICINE CLINIC | Facility: CLINIC | Age: 89
End: 2024-01-01

## 2024-01-01 ENCOUNTER — HOSPITAL ENCOUNTER (INPATIENT)
Facility: HOSPITAL | Age: 89
LOS: 1 days | DRG: 283 | End: 2024-11-17
Attending: EMERGENCY MEDICINE | Admitting: INTERNAL MEDICINE
Payer: MEDICARE

## 2024-01-01 ENCOUNTER — APPOINTMENT (OUTPATIENT)
Dept: GENERAL RADIOLOGY | Facility: HOSPITAL | Age: 89
DRG: 283 | End: 2024-01-01
Payer: MEDICARE

## 2024-01-01 VITALS
WEIGHT: 136.24 LBS | SYSTOLIC BLOOD PRESSURE: 67 MMHG | RESPIRATION RATE: 20 BRPM | OXYGEN SATURATION: 60 % | TEMPERATURE: 98.4 F | DIASTOLIC BLOOD PRESSURE: 43 MMHG | BODY MASS INDEX: 24.13 KG/M2

## 2024-01-01 DIAGNOSIS — I21.3 ST ELEVATION MYOCARDIAL INFARCTION (STEMI), UNSPECIFIED ARTERY: Primary | ICD-10-CM

## 2024-01-01 DIAGNOSIS — S00.81XA ABRASION OF FACE, INITIAL ENCOUNTER: ICD-10-CM

## 2024-01-01 LAB
ALBUMIN SERPL-MCNC: 2.9 G/DL (ref 3.5–5.2)
ALBUMIN/GLOB SERPL: 1.1 G/DL
ALP SERPL-CCNC: 145 U/L (ref 39–117)
ALT SERPL W P-5'-P-CCNC: 153 U/L (ref 1–33)
ANION GAP SERPL CALCULATED.3IONS-SCNC: 21 MMOL/L (ref 5–15)
ANION GAP SERPL CALCULATED.3IONS-SCNC: 21.8 MMOL/L (ref 5–15)
ANION GAP SERPL CALCULATED.3IONS-SCNC: 26 MMOL/L (ref 5–15)
APTT PPP: 35.6 SECONDS (ref 22.7–35.4)
AST SERPL-CCNC: 681 U/L (ref 1–32)
BASOPHILS # BLD AUTO: 0.02 10*3/MM3 (ref 0–0.2)
BASOPHILS NFR BLD AUTO: 0.1 % (ref 0–1.5)
BILIRUB SERPL-MCNC: 2.3 MG/DL (ref 0–1.2)
BUN SERPL-MCNC: 39 MG/DL (ref 8–23)
BUN SERPL-MCNC: 42 MG/DL (ref 8–23)
BUN SERPL-MCNC: 45 MG/DL (ref 8–23)
BUN/CREAT SERPL: 12.2 (ref 7–25)
BUN/CREAT SERPL: 12.2 (ref 7–25)
BUN/CREAT SERPL: 12.3 (ref 7–25)
CALCIUM SPEC-SCNC: 7.9 MG/DL (ref 8.6–10.5)
CALCIUM SPEC-SCNC: 8.3 MG/DL (ref 8.6–10.5)
CALCIUM SPEC-SCNC: 8.6 MG/DL (ref 8.6–10.5)
CHLORIDE SERPL-SCNC: 106 MMOL/L (ref 98–107)
CHLORIDE SERPL-SCNC: 107 MMOL/L (ref 98–107)
CHLORIDE SERPL-SCNC: 108 MMOL/L (ref 98–107)
CO2 SERPL-SCNC: 12 MMOL/L (ref 22–29)
CO2 SERPL-SCNC: 13 MMOL/L (ref 22–29)
CO2 SERPL-SCNC: 15.2 MMOL/L (ref 22–29)
CREAT SERPL-MCNC: 3.2 MG/DL (ref 0.57–1)
CREAT SERPL-MCNC: 3.44 MG/DL (ref 0.57–1)
CREAT SERPL-MCNC: 3.66 MG/DL (ref 0.57–1)
DEPRECATED RDW RBC AUTO: 52 FL (ref 37–54)
DEPRECATED RDW RBC AUTO: 52.1 FL (ref 37–54)
DEPRECATED RDW RBC AUTO: 53.8 FL (ref 37–54)
EGFRCR SERPLBLD CKD-EPI 2021: 11.4 ML/MIN/1.73
EGFRCR SERPLBLD CKD-EPI 2021: 12.3 ML/MIN/1.73
EGFRCR SERPLBLD CKD-EPI 2021: 13.4 ML/MIN/1.73
EOSINOPHIL # BLD AUTO: 0 10*3/MM3 (ref 0–0.4)
EOSINOPHIL NFR BLD AUTO: 0 % (ref 0.3–6.2)
ERYTHROCYTE [DISTWIDTH] IN BLOOD BY AUTOMATED COUNT: 15.2 % (ref 12.3–15.4)
ERYTHROCYTE [DISTWIDTH] IN BLOOD BY AUTOMATED COUNT: 15.4 % (ref 12.3–15.4)
ERYTHROCYTE [DISTWIDTH] IN BLOOD BY AUTOMATED COUNT: 15.6 % (ref 12.3–15.4)
GLOBULIN UR ELPH-MCNC: 2.6 GM/DL
GLUCOSE BLDC GLUCOMTR-MCNC: 125 MG/DL (ref 70–130)
GLUCOSE SERPL-MCNC: 121 MG/DL (ref 65–99)
GLUCOSE SERPL-MCNC: 132 MG/DL (ref 65–99)
GLUCOSE SERPL-MCNC: 63 MG/DL (ref 65–99)
HCT VFR BLD AUTO: 41.7 % (ref 34–46.6)
HCT VFR BLD AUTO: 46 % (ref 34–46.6)
HCT VFR BLD AUTO: 46.7 % (ref 34–46.6)
HGB BLD-MCNC: 13.8 G/DL (ref 12–15.9)
HGB BLD-MCNC: 14.7 G/DL (ref 12–15.9)
HGB BLD-MCNC: 14.8 G/DL (ref 12–15.9)
HOLD SPECIMEN: NORMAL
HOLD SPECIMEN: NORMAL
IMM GRANULOCYTES # BLD AUTO: 0.07 10*3/MM3 (ref 0–0.05)
IMM GRANULOCYTES NFR BLD AUTO: 0.4 % (ref 0–0.5)
INR PPP: 1.68 (ref 0.9–1.1)
LYMPHOCYTES # BLD AUTO: 0.91 10*3/MM3 (ref 0.7–3.1)
LYMPHOCYTES NFR BLD AUTO: 5.8 % (ref 19.6–45.3)
MAGNESIUM SERPL-MCNC: 2.5 MG/DL (ref 1.6–2.4)
MCH RBC QN AUTO: 29.6 PG (ref 26.6–33)
MCH RBC QN AUTO: 29.7 PG (ref 26.6–33)
MCH RBC QN AUTO: 30.2 PG (ref 26.6–33)
MCHC RBC AUTO-ENTMCNC: 31.7 G/DL (ref 31.5–35.7)
MCHC RBC AUTO-ENTMCNC: 32 G/DL (ref 31.5–35.7)
MCHC RBC AUTO-ENTMCNC: 33.1 G/DL (ref 31.5–35.7)
MCV RBC AUTO: 91.2 FL (ref 79–97)
MCV RBC AUTO: 92.6 FL (ref 79–97)
MCV RBC AUTO: 93.6 FL (ref 79–97)
MONOCYTES # BLD AUTO: 1.42 10*3/MM3 (ref 0.1–0.9)
MONOCYTES NFR BLD AUTO: 9 % (ref 5–12)
NEUTROPHILS NFR BLD AUTO: 13.36 10*3/MM3 (ref 1.7–7)
NEUTROPHILS NFR BLD AUTO: 84.7 % (ref 42.7–76)
NRBC BLD AUTO-RTO: 0 /100 WBC (ref 0–0.2)
NT-PROBNP SERPL-MCNC: 7462 PG/ML (ref 0–1800)
PHOSPHATE SERPL-MCNC: 7.2 MG/DL (ref 2.5–4.5)
PLATELET # BLD AUTO: 124 10*3/MM3 (ref 140–450)
PLATELET # BLD AUTO: 127 10*3/MM3 (ref 140–450)
PLATELET # BLD AUTO: 92 10*3/MM3 (ref 140–450)
PMV BLD AUTO: 11.9 FL (ref 6–12)
PMV BLD AUTO: 12.1 FL (ref 6–12)
PMV BLD AUTO: 12.8 FL (ref 6–12)
POTASSIUM SERPL-SCNC: 5.6 MMOL/L (ref 3.5–5.2)
POTASSIUM SERPL-SCNC: 5.8 MMOL/L (ref 3.5–5.2)
POTASSIUM SERPL-SCNC: 6 MMOL/L (ref 3.5–5.2)
PROT SERPL-MCNC: 5.5 G/DL (ref 6–8.5)
PROTHROMBIN TIME: 20.1 SECONDS (ref 11.7–14.2)
QT INTERVAL: 389 MS
QT INTERVAL: 402 MS
QTC INTERVAL: 438 MS
QTC INTERVAL: 440 MS
RBC # BLD AUTO: 4.57 10*6/MM3 (ref 3.77–5.28)
RBC # BLD AUTO: 4.97 10*6/MM3 (ref 3.77–5.28)
RBC # BLD AUTO: 4.99 10*6/MM3 (ref 3.77–5.28)
SODIUM SERPL-SCNC: 141 MMOL/L (ref 136–145)
SODIUM SERPL-SCNC: 143 MMOL/L (ref 136–145)
SODIUM SERPL-SCNC: 146 MMOL/L (ref 136–145)
TROPONIN T SERPL HS-MCNC: 6795 NG/L
TROPONIN T SERPL HS-MCNC: 8840 NG/L
WBC NRBC COR # BLD AUTO: 14.48 10*3/MM3 (ref 3.4–10.8)
WBC NRBC COR # BLD AUTO: 15.78 10*3/MM3 (ref 3.4–10.8)
WBC NRBC COR # BLD AUTO: 19.98 10*3/MM3 (ref 3.4–10.8)
WHOLE BLOOD HOLD COAG: NORMAL
WHOLE BLOOD HOLD SPECIMEN: NORMAL

## 2024-01-01 PROCEDURE — 84100 ASSAY OF PHOSPHORUS: CPT | Performed by: EMERGENCY MEDICINE

## 2024-01-01 PROCEDURE — 93005 ELECTROCARDIOGRAM TRACING: CPT | Performed by: EMERGENCY MEDICINE

## 2024-01-01 PROCEDURE — B2111ZZ FLUOROSCOPY OF MULTIPLE CORONARY ARTERIES USING LOW OSMOLAR CONTRAST: ICD-10-PCS | Performed by: INTERNAL MEDICINE

## 2024-01-01 PROCEDURE — 99239 HOSP IP/OBS DSCHRG MGMT >30: CPT | Performed by: INTERNAL MEDICINE

## 2024-01-01 PROCEDURE — 85610 PROTHROMBIN TIME: CPT | Performed by: EMERGENCY MEDICINE

## 2024-01-01 PROCEDURE — 80048 BASIC METABOLIC PNL TOTAL CA: CPT | Performed by: INTERNAL MEDICINE

## 2024-01-01 PROCEDURE — 85027 COMPLETE CBC AUTOMATED: CPT | Performed by: INTERNAL MEDICINE

## 2024-01-01 PROCEDURE — 93458 L HRT ARTERY/VENTRICLE ANGIO: CPT | Performed by: INTERNAL MEDICINE

## 2024-01-01 PROCEDURE — 25010000002 HEPARIN (PORCINE) PER 1000 UNITS: Performed by: INTERNAL MEDICINE

## 2024-01-01 PROCEDURE — 25010000002 LIDOCAINE 2% SOLUTION: Performed by: INTERNAL MEDICINE

## 2024-01-01 PROCEDURE — C1894 INTRO/SHEATH, NON-LASER: HCPCS | Performed by: INTERNAL MEDICINE

## 2024-01-01 PROCEDURE — 99223 1ST HOSP IP/OBS HIGH 75: CPT | Performed by: INTERNAL MEDICINE

## 2024-01-01 PROCEDURE — 25010000002 TETANUS-DIPHTH-ACELL PERTUSSIS 5-2.5-18.5 LF-MCG/0.5 SUSPENSION PREFILLED SYRINGE: Performed by: EMERGENCY MEDICINE

## 2024-01-01 PROCEDURE — 84484 ASSAY OF TROPONIN QUANT: CPT | Performed by: INTERNAL MEDICINE

## 2024-01-01 PROCEDURE — 70450 CT HEAD/BRAIN W/O DYE: CPT

## 2024-01-01 PROCEDURE — 83735 ASSAY OF MAGNESIUM: CPT | Performed by: EMERGENCY MEDICINE

## 2024-01-01 PROCEDURE — 82948 REAGENT STRIP/BLOOD GLUCOSE: CPT

## 2024-01-01 PROCEDURE — 84484 ASSAY OF TROPONIN QUANT: CPT | Performed by: EMERGENCY MEDICINE

## 2024-01-01 PROCEDURE — C1769 GUIDE WIRE: HCPCS | Performed by: INTERNAL MEDICINE

## 2024-01-01 PROCEDURE — 25010000002 FENTANYL CITRATE (PF) 50 MCG/ML SOLUTION: Performed by: INTERNAL MEDICINE

## 2024-01-01 PROCEDURE — 85730 THROMBOPLASTIN TIME PARTIAL: CPT | Performed by: EMERGENCY MEDICINE

## 2024-01-01 PROCEDURE — 90471 IMMUNIZATION ADMIN: CPT | Performed by: EMERGENCY MEDICINE

## 2024-01-01 PROCEDURE — 90715 TDAP VACCINE 7 YRS/> IM: CPT | Performed by: EMERGENCY MEDICINE

## 2024-01-01 PROCEDURE — 4A023N7 MEASUREMENT OF CARDIAC SAMPLING AND PRESSURE, LEFT HEART, PERCUTANEOUS APPROACH: ICD-10-PCS | Performed by: INTERNAL MEDICINE

## 2024-01-01 PROCEDURE — 93005 ELECTROCARDIOGRAM TRACING: CPT | Performed by: INTERNAL MEDICINE

## 2024-01-01 PROCEDURE — 85025 COMPLETE CBC W/AUTO DIFF WBC: CPT | Performed by: EMERGENCY MEDICINE

## 2024-01-01 PROCEDURE — 71045 X-RAY EXAM CHEST 1 VIEW: CPT

## 2024-01-01 PROCEDURE — 25510000001 IOPAMIDOL PER 1 ML: Performed by: INTERNAL MEDICINE

## 2024-01-01 PROCEDURE — 93010 ELECTROCARDIOGRAM REPORT: CPT | Performed by: INTERNAL MEDICINE

## 2024-01-01 PROCEDURE — 99291 CRITICAL CARE FIRST HOUR: CPT

## 2024-01-01 PROCEDURE — 25810000003 SODIUM CHLORIDE 0.9 % SOLUTION: Performed by: EMERGENCY MEDICINE

## 2024-01-01 PROCEDURE — 80053 COMPREHEN METABOLIC PANEL: CPT | Performed by: EMERGENCY MEDICINE

## 2024-01-01 PROCEDURE — 70486 CT MAXILLOFACIAL W/O DYE: CPT

## 2024-01-01 PROCEDURE — 83880 ASSAY OF NATRIURETIC PEPTIDE: CPT | Performed by: EMERGENCY MEDICINE

## 2024-01-01 PROCEDURE — 72125 CT NECK SPINE W/O DYE: CPT

## 2024-01-01 RX ORDER — ONDANSETRON 2 MG/ML
4 INJECTION INTRAMUSCULAR; INTRAVENOUS EVERY 6 HOURS PRN
Status: DISCONTINUED | OUTPATIENT
Start: 2024-01-01 | End: 2024-01-01 | Stop reason: HOSPADM

## 2024-01-01 RX ORDER — SODIUM CHLORIDE 0.9 % (FLUSH) 0.9 %
10 SYRINGE (ML) INJECTION AS NEEDED
Status: DISCONTINUED | OUTPATIENT
Start: 2024-01-01 | End: 2024-01-01 | Stop reason: HOSPADM

## 2024-01-01 RX ORDER — HYDROCODONE BITARTRATE AND ACETAMINOPHEN 5; 325 MG/1; MG/1
1 TABLET ORAL EVERY 4 HOURS PRN
Status: DISCONTINUED | OUTPATIENT
Start: 2024-01-01 | End: 2024-01-01 | Stop reason: HOSPADM

## 2024-01-01 RX ORDER — CLOPIDOGREL BISULFATE 75 MG/1
75 TABLET ORAL DAILY
Status: DISCONTINUED | OUTPATIENT
Start: 2024-01-01 | End: 2024-01-01 | Stop reason: HOSPADM

## 2024-01-01 RX ORDER — ACETAMINOPHEN 325 MG/1
650 TABLET ORAL EVERY 4 HOURS PRN
Status: DISCONTINUED | OUTPATIENT
Start: 2024-01-01 | End: 2024-01-01 | Stop reason: HOSPADM

## 2024-01-01 RX ORDER — SODIUM CHLORIDE 9 MG/ML
INJECTION, SOLUTION INTRAVENOUS
Status: COMPLETED | OUTPATIENT
Start: 2024-01-01 | End: 2024-01-01

## 2024-01-01 RX ORDER — LIDOCAINE HYDROCHLORIDE 20 MG/ML
INJECTION, SOLUTION INFILTRATION; PERINEURAL
Status: DISCONTINUED | OUTPATIENT
Start: 2024-01-01 | End: 2024-01-01 | Stop reason: HOSPADM

## 2024-01-01 RX ORDER — HEPARIN SODIUM 1000 [USP'U]/ML
INJECTION, SOLUTION INTRAVENOUS; SUBCUTANEOUS
Status: DISCONTINUED | OUTPATIENT
Start: 2024-01-01 | End: 2024-01-01 | Stop reason: HOSPADM

## 2024-01-01 RX ORDER — FENTANYL CITRATE 50 UG/ML
INJECTION, SOLUTION INTRAMUSCULAR; INTRAVENOUS
Status: DISCONTINUED | OUTPATIENT
Start: 2024-01-01 | End: 2024-01-01 | Stop reason: HOSPADM

## 2024-01-01 RX ORDER — IOPAMIDOL 755 MG/ML
INJECTION, SOLUTION INTRAVASCULAR
Status: DISCONTINUED | OUTPATIENT
Start: 2024-01-01 | End: 2024-01-01 | Stop reason: HOSPADM

## 2024-01-01 RX ORDER — VERAPAMIL HYDROCHLORIDE 2.5 MG/ML
INJECTION, SOLUTION INTRAVENOUS
Status: DISCONTINUED | OUTPATIENT
Start: 2024-01-01 | End: 2024-01-01 | Stop reason: HOSPADM

## 2024-01-01 RX ORDER — ASPIRIN 81 MG/1
81 TABLET ORAL DAILY
Status: DISCONTINUED | OUTPATIENT
Start: 2024-01-01 | End: 2024-01-01 | Stop reason: HOSPADM

## 2024-01-01 RX ORDER — NITROGLYCERIN 0.4 MG/1
0.4 TABLET SUBLINGUAL
Status: DISCONTINUED | OUTPATIENT
Start: 2024-01-01 | End: 2024-01-01 | Stop reason: HOSPADM

## 2024-01-01 RX ORDER — SODIUM CHLORIDE 9 MG/ML
125 INJECTION, SOLUTION INTRAVENOUS CONTINUOUS
Status: DISCONTINUED | OUTPATIENT
Start: 2024-01-01 | End: 2024-01-01 | Stop reason: HOSPADM

## 2024-01-01 RX ORDER — ASPIRIN 325 MG
325 TABLET ORAL ONCE
Status: COMPLETED | OUTPATIENT
Start: 2024-01-01 | End: 2024-01-01

## 2024-01-01 RX ORDER — ONDANSETRON 4 MG/1
4 TABLET, ORALLY DISINTEGRATING ORAL EVERY 6 HOURS PRN
Status: DISCONTINUED | OUTPATIENT
Start: 2024-01-01 | End: 2024-01-01 | Stop reason: HOSPADM

## 2024-01-01 RX ADMIN — TETANUS TOXOID, REDUCED DIPHTHERIA TOXOID AND ACELLULAR PERTUSSIS VACCINE, ADSORBED 0.5 ML: 5; 2.5; 8; 8; 2.5 SUSPENSION INTRAMUSCULAR at 21:59

## 2024-01-01 RX ADMIN — SODIUM CHLORIDE 1000 ML: 9 INJECTION, SOLUTION INTRAVENOUS at 21:53

## 2024-01-01 RX ADMIN — ASPIRIN 325 MG: 325 TABLET ORAL at 21:53

## 2024-01-20 ENCOUNTER — HOSPITAL ENCOUNTER (EMERGENCY)
Facility: HOSPITAL | Age: 89
Discharge: HOME | End: 2024-01-21
Attending: EMERGENCY MEDICINE
Payer: MEDICARE

## 2024-01-20 VITALS
WEIGHT: 145 LBS | HEIGHT: 63 IN | OXYGEN SATURATION: 97 % | TEMPERATURE: 99 F | DIASTOLIC BLOOD PRESSURE: 72 MMHG | SYSTOLIC BLOOD PRESSURE: 152 MMHG | BODY MASS INDEX: 25.69 KG/M2 | RESPIRATION RATE: 15 BRPM | HEART RATE: 79 BPM

## 2024-01-20 DIAGNOSIS — U07.1 COVID-19: Primary | ICD-10-CM

## 2024-01-20 PROCEDURE — 87637 SARSCOV2&INF A&B&RSV AMP PRB: CPT | Performed by: EMERGENCY MEDICINE

## 2024-01-20 PROCEDURE — 99283 EMERGENCY DEPT VISIT LOW MDM: CPT

## 2024-01-21 LAB
FLUAV SUBTYP SPEC NAA+PROBE: NOT DETECTED
FLUBV RNA ISLT QL NAA+PROBE: NOT DETECTED
RSV RNA NPH QL NAA+NON-PROBE: NOT DETECTED
SARS-COV-2 RNA RESP QL NAA+PROBE: DETECTED

## 2024-01-21 PROCEDURE — 99283 EMERGENCY DEPT VISIT LOW MDM: CPT | Performed by: EMERGENCY MEDICINE

## 2024-01-21 RX ORDER — LISINOPRIL 5 MG/1
5 TABLET ORAL DAILY
Qty: 90 TABLET | Refills: 0 | Status: SHIPPED | OUTPATIENT
Start: 2024-01-21 | End: 2024-04-20

## 2024-01-21 RX ORDER — LISINOPRIL 5 MG/1
5 TABLET ORAL DAILY
Qty: 90 TABLET | Refills: 0 | Status: SHIPPED | OUTPATIENT
Start: 2024-01-21 | End: 2024-01-21 | Stop reason: SDUPTHER

## 2024-01-21 RX ORDER — DEXTROMETHORPHAN HYDROBROMIDE AND PROMETHAZINE HYDROCHLORIDE 15; 6.25 MG/5ML; MG/5ML
2.5 SYRUP ORAL EVERY 8 HOURS PRN
Qty: 80 ML | Refills: 0 | Status: SHIPPED | OUTPATIENT
Start: 2024-01-21 | End: 2024-01-27

## 2024-01-21 NOTE — DISCHARGE INSTRUCTIONS
Today you are positive for COVID-19.  Your influenza and RSV are negative.    I did send in a prescription for Paxlovid.  Please hold your Lipitor while you are taking the Paxlovid.    I did send in a refill to your lisinopril.  I also printed out a prescription for your lisinopril.    I sent your prescriptions into the [facility].    Return anytime for recheck should you develop increased shortness of breath or other difficulties    Please read all of the instructions in this handout.  If you receive prescriptions please fill them and take them as directed.  Please call your primary care physician for follow-up appointment in the next 5 to 7 days.  If you do not have a physician you may call the [facility] referral line at [phone].    You may return to the emergency department at any time for any concerns such as worsening symptoms.  If you received a work or school note it will be printed at the back of this packet.

## 2024-01-21 NOTE — FSED PROVIDER NOTE
Subjective   History of Present Illness  Patient is a 88-year-old female.  She presents with a 1 day history of cough, nasal congestion.  No known exposure to COVID or influenza.  Of note she did take a home test prior to arrival tonight and it is positive for COVID.  She is status post original vaccination as well as 1 booster.  She is not aware of any positive COVID in the last 3 years.  No chest pain no shortness of breath.        Review of Systems  Constitutional: No fevers, chills, sweats unless otherwise documented in HPI  Eyes: No recent visual problems, eye discharge, eye pain, redness unless otherwise documented in HPI  HEENT: No ear pain, nasal congestion, sore throat, voice changes unless otherwise documented in HPI  Respiratory: No shortness of breath, cough, pain on breathing, sputum production unless otherwise documented in HPI  Cardiovascular: No chest pain, palpitations, syncope, orthopnea unless otherwise documented in HPI  Gastrointestinal: No nausea, vomiting, diarrhea, constipation unless otherwise documented in HPI  Genitourinary: No hematuria, dysuria, incontinence unless otherwise documented in HPI  Endocrine: Negative for excessive thirst, excessive hunger, excessive urination, heat or cold intolerance unless otherwise documented in HPI  Musculoskeletal: No back pain, neck pain, joint pain, muscle pain, decreased range of motion unless otherwise documented in HPI  Integumentary: No rash, pruritus, abrasion, lesions unless otherwise documented in HPI  Neurologic: No weakness, numbness, frequent headaches, tremors unless otherwise documented in HPI  Psychiatric: No anxiety, depression, mood changes, hallucinations unless otherwise documented in HPI        Past Medical History:   Diagnosis Date    Anemia     Cirrhosis, non-alcoholic     CKD (chronic kidney disease) stage 3, GFR 30-59 ml/min 1/24/2019    Gallbladder stone without cholecystitis or obstruction     gallstone seen on ultrasound 2007     GERD (gastroesophageal reflux disease)     Health care maintenance     Hyperlipidemia     Hypertension     Lumbar canal stenosis     Lumbar radiculopathy     MI (myocardial infarction) 12/25/2016    NON-STEMI    NSTEMI (non-ST elevated myocardial infarction)     Osteoarthritis     Thrombophlebitis of superficial veins of upper extremities     LEFT       No Known Allergies    Past Surgical History:   Procedure Laterality Date    CARDIAC CATHETERIZATION N/A 3/1/2017    Procedure: Coronary angiography;  Surgeon: Desean Earl MD;  Location: [facility] CATH INVASIVE LOCATION;  Service:     COLONOSCOPY N/A 12/27/2016    Procedure: COLONOSCOPY INTO CECUM WITH ARGON PLASMA COAGULATION;  Surgeon: Javier Peñaloza MD;  Location: [facility] ENDOSCOPY;  Service:     ENDOSCOPY N/A 12/27/2016    Procedure: ESOPHAGOGASTRODUODENOSCOPY WITH COLD BIOPSIES;  Surgeon: Javier Peñaloza MD;  Location: [facility] ENDOSCOPY;  Service:     HIP ARTHROPLASTY Right 04/01/2015    HIP CLOSED REDUCTION Left 1/25/2019    Procedure: LT. HIP CLOSED REDUCTION;  Surgeon: Domenico Peñaloza MD;  Location: [facility] OR;  Service: Orthopedics    HYSTERECTOMY  1986    JOINT REPLACEMENT      KNEE SURGERY Bilateral     2007 & 2008    LUNG SURGERY  1965    TONSILLECTOMY      TOTAL HIP ARTHROPLASTY Left 1/23/2019    Procedure: LEFT TOTAL HIP ARTHROPLASTY MARICRUZ NAVIGATION;  Surgeon: Domenico Peñaloza MD;  Location: [facility] OR;  Service: Orthopedics       Family History   Problem Relation Age of Onset    Hypertension Other     Heart disease Other     Liver cancer Brother     Ovarian cancer Daughter         diagnosed 2012    No Known Problems Mother     Heart disease Father     Heart attack Father     Hypertension Father     Cancer Maternal Grandmother     No Known Problems Maternal Grandfather     No Known Problems Paternal Grandmother     No Known Problems Paternal Grandfather     Malig Hyperthermia Neg Hx        Social History     Socioeconomic History     Marital status:     Years of education: College   Tobacco Use    Smoking status: Never    Smokeless tobacco: Never    Tobacco comments:     occ caffeine use   Substance and Sexual Activity    Alcohol use: No    Drug use: No    Sexual activity: Defer           Objective   Physical Exam  Vital signs: Reviewed in nurses notes    General: Awake alert no acute distress    HEENT: Normocephalic atraumatic nasopharynx slightly congested Prideaux pharynx is clear with moist mucous membranes    Neck:   Supple without elevation of JVD    Respiratory:   Clear to auscultation except for mild upper respiratory congestion    Cardiovascular: Regular rate and rhythm.    Abdomen: Soft nondistended nontender    Skin:   Warm and dry.  No rashes noted    Neurological examination: Awake alert oriented.  Speech is intact.  No facial palsy.  Good strength and tone x 4 extremities    Procedures         Creatinine Clearance (Cockcroft-Gault Equation) - MDCalc  Calculated on Jan 21 2024 12:12 AM  43 mL/min -> Creatinine clearance, original Cockcroft-Gault  ED Course                                 Lab Results (last 72 hours)       Procedure Component Value Units Date/Time    COVID-19 and FLU A/B PCR, 1 HR TAT - Swab, Nasopharynx [[id number]]  (Abnormal) Collected: 01/20/24 2357    Specimen: Swab from Nasopharynx Updated: 01/21/24 0023     COVID19 Detected     Influenza A PCR Not Detected     Influenza B PCR Not Detected    Narrative:      Fact sheet for providers: https://www.fda.gov/media/945330/download    Fact sheet for patients: https://www.fda.gov/media/753396/download    Test performed by PCR.  Influenza A and Influenza B negative results should be considered presumptive in samples that have a positive SARS-CoV-2 result.    Competitive inhibition studies showed that SARS-CoV-2 virus, when present at concentrations above 3.6E+04 copies/mL, can inhibit the detection and amplification of influenza A and influenza B virus RNA if  present at or below 1.8E+02 copies/mL or 4.9E+02 copies/mL, respectively, and may lead to false negative influenza virus results. If co-infection with influenza A or influenza B virus is suspected in samples with a positive SARS-CoV-2 result, the sample should be re-tested with another FDA cleared, approved, or authorized influenza test, if influenza virus detection would change clinical management.  Influenza A and Influenza B negative results should be considered presumptive in samples that have a positive SARS-CoV-2 result.    Competitive inhibition studies showed that SARS-CoV-2 virus, when present at concentrations above 3.6E+04 copies/mL, can inhibit the detection and amplification of influenza A and influenza B virus RNA if present at or below 1.8E+02 copies/mL or 4.9E+02 copies/mL, respectively, and may lead to false negative influenza virus results. If co-infection with influenza A or influenza B virus is suspected in samples with a positive SARS-CoV-2 result, the sample should be re-tested with another FDA cleared, approved, or authorized influenza test, if influenza virus detection would change clinical management.    RSV PCR - Swab, Nasopharynx [[id number]]  (Normal) Collected: 01/20/24 6937    Specimen: Swab from Nasopharynx Updated: 01/21/24 0021     RSV, PCR Not Detected             Patient and patient's family voiced desire for Paxlovid.  She does well with 5.  I did review her last creatinine clearance.  I did also review her medication list and have advised to hold her statin while taking the Paxlovid.        DDx: COVID RSV influenza  Medical Decision Making  Amount and/or Complexity of Data Reviewed  Labs: ordered.    Upon discharge patient noted to be very stable.  Appropriate treatment plan and return precautions discussed    Final diagnoses:   COVID-19       ED Disposition  ED Disposition       ED Disposition   Discharge    Condition   Stable    Comment   --               Provider, No  Known  [place] [id number] [phone]    In 1 week           Medication List        New Prescriptions      Nirmatrelvir & Ritonavir (300mg/100mg) 20 x 150 MG & 10 x 100MG tablet therapy pack tablet  Commonly known as: PAXLOVID  Take 3 tablets by mouth 2 (Two) Times a Day for 5 days.     promethazine-dextromethorphan 6.25-15 MG/5ML syrup  Commonly known as: PROMETHAZINE-DM  Take 2.5 mL by mouth Every 8 (Eight) Hours As Needed for Cough for up to 6 days.            Changed      * lisinopril 5 MG tablet  Commonly known as: PRINIVIL,ZESTRIL  TAKE 1 TABLET BY MOUTH DAILY  What changed: Another medication with the same name was added. Make sure you understand how and when to take each.     * lisinopril 5 MG tablet  Commonly known as: PRINIVIL,ZESTRIL  Take 1 tablet by mouth Daily for 90 days.  What changed: You were already taking a medication with the same name, and this prescription was added. Make sure you understand how and when to take each.           * This list has 2 medication(s) that are the same as other medications prescribed for you. Read the directions carefully, and ask your doctor or other care provider to review them with you.                   Where to Get Your Medications        These medications were sent to [facility]/pharmacy #[id number] - [place], [place] - [address] AT [facility] [phone] [facility] [phone]   [address] [facility] [address]      Phone: [phone]   Nirmatrelvir & Ritonavir (300mg/100mg) 20 x 150 MG & 10 x 100MG tablet therapy pack tablet  promethazine-dextromethorphan 6.25-15 MG/5ML syrup       You can get these medications from any pharmacy    Bring a paper prescription for each of these medications  lisinopril 5 MG tablet

## 2024-02-27 NOTE — PROGRESS NOTES
PCP controls cholesterol   Headache    A headache is pain or discomfort felt around the head or neck area. The specific cause of a headache may not be found as there are many types including tension headaches, migraine headaches, and cluster headaches. Watch your condition for any changes. Things you can do to manage your pain include taking over the counter and prescription medications as instructed by your health care provider, lying down in a dark quiet room, limiting stress, getting regular sleep, and refraining from alcohol and tobacco products.    SEEK IMMEDIATE MEDICAL CARE IF YOU HAVE ANY OF THE FOLLOWING SYMPTOMS: fever, vomiting, stiff neck, loss of vision, problems with speech, muscle weakness, loss of balance, trouble walking, passing out, or confusion.

## 2024-04-09 ENCOUNTER — OFFICE VISIT (OUTPATIENT)
Dept: FAMILY MEDICINE CLINIC | Facility: CLINIC | Age: 89
End: 2024-04-09
Payer: MEDICARE

## 2024-04-09 VITALS
HEIGHT: 63 IN | TEMPERATURE: 98.2 F | RESPIRATION RATE: 16 BRPM | HEART RATE: 86 BPM | WEIGHT: 137 LBS | BODY MASS INDEX: 24.27 KG/M2 | SYSTOLIC BLOOD PRESSURE: 110 MMHG | OXYGEN SATURATION: 96 % | DIASTOLIC BLOOD PRESSURE: 62 MMHG

## 2024-04-09 DIAGNOSIS — D22.9 NUMEROUS MOLES: ICD-10-CM

## 2024-04-09 DIAGNOSIS — L60.0 INGROWN TOENAIL: ICD-10-CM

## 2024-04-09 DIAGNOSIS — K74.60 CIRRHOSIS, NON-ALCOHOLIC: ICD-10-CM

## 2024-04-09 DIAGNOSIS — D50.9 IRON DEFICIENCY ANEMIA, UNSPECIFIED IRON DEFICIENCY ANEMIA TYPE: ICD-10-CM

## 2024-04-09 DIAGNOSIS — I10 ESSENTIAL HYPERTENSION: Primary | ICD-10-CM

## 2024-04-09 DIAGNOSIS — Z13.820 ENCOUNTER FOR OSTEOPOROSIS SCREENING IN ASYMPTOMATIC POSTMENOPAUSAL PATIENT: ICD-10-CM

## 2024-04-09 DIAGNOSIS — E78.5 HYPERLIPIDEMIA, UNSPECIFIED HYPERLIPIDEMIA TYPE: ICD-10-CM

## 2024-04-09 DIAGNOSIS — R73.01 ELEVATED FASTING GLUCOSE: ICD-10-CM

## 2024-04-09 DIAGNOSIS — M25.512 ARTHRALGIA OF LEFT SHOULDER REGION: ICD-10-CM

## 2024-04-09 DIAGNOSIS — Z78.0 ENCOUNTER FOR OSTEOPOROSIS SCREENING IN ASYMPTOMATIC POSTMENOPAUSAL PATIENT: ICD-10-CM

## 2024-04-09 PROBLEM — K21.9 GASTRO-ESOPHAGEAL REFLUX DISEASE WITHOUT ESOPHAGITIS: Status: ACTIVE | Noted: 2019-01-28

## 2024-04-09 PROBLEM — R41.841 COGNITIVE COMMUNICATION DEFICIT: Status: ACTIVE | Noted: 2019-01-28

## 2024-04-09 PROCEDURE — 1160F RVW MEDS BY RX/DR IN RCRD: CPT | Performed by: NURSE PRACTITIONER

## 2024-04-09 PROCEDURE — 99213 OFFICE O/P EST LOW 20 MIN: CPT | Performed by: NURSE PRACTITIONER

## 2024-04-09 PROCEDURE — 1159F MED LIST DOCD IN RCRD: CPT | Performed by: NURSE PRACTITIONER

## 2024-04-09 RX ORDER — ATORVASTATIN CALCIUM 80 MG/1
80 TABLET, FILM COATED ORAL NIGHTLY
Qty: 90 TABLET | Refills: 0 | Status: SHIPPED | OUTPATIENT
Start: 2024-04-09 | End: 2024-04-17 | Stop reason: SDUPTHER

## 2024-04-09 NOTE — PROGRESS NOTES
Subjective     Pamela Durham is a 88 y.o.. female.     Patient here today to become established.     Patient with problem list that includes hypertension, hyperlipidemia, non alcoholic cirrhosis, iron def anemia, lung nodule and generalized weakness with cognitive communication deficit.       Patient needing letter for her trash can to be picked up at her back door due to mobility issues, Patient walks with walker.    Patient stating she is eating healthy, drinks some water during day, drinks diet coke through out day and orange juice in am.        The following portions of the patient's history were reviewed and updated as appropriate: allergies, current medications, past family history, past medical history, past social history, past surgical history and problem list.    Past Medical History:   Diagnosis Date    Anemia     Cirrhosis, non-alcoholic     CKD (chronic kidney disease) stage 3, GFR 30-59 ml/min 1/24/2019    Gallbladder stone without cholecystitis or obstruction     gallstone seen on ultrasound 2007    GERD (gastroesophageal reflux disease)     Health care maintenance     Hyperlipidemia     Hypertension     Lumbar canal stenosis     Lumbar radiculopathy     MI (myocardial infarction) 12/25/2016    NON-STEMI    NSTEMI (non-ST elevated myocardial infarction)     Osteoarthritis     Thrombophlebitis of superficial veins of upper extremities     LEFT       Past Surgical History:   Procedure Laterality Date    CARDIAC CATHETERIZATION N/A 3/1/2017    Procedure: Coronary angiography;  Surgeon: Desean Earl MD;  Location: [facility] CATH INVASIVE LOCATION;  Service:     COLONOSCOPY N/A 12/27/2016    Procedure: COLONOSCOPY INTO CECUM WITH ARGON PLASMA COAGULATION;  Surgeon: Javier Peñaloza MD;  Location: [facility] ENDOSCOPY;  Service:     ENDOSCOPY N/A 12/27/2016    Procedure: ESOPHAGOGASTRODUODENOSCOPY WITH COLD BIOPSIES;  Surgeon: Javier Peñaloza MD;  Location: [facility] ENDOSCOPY;  Service:     HIP ARTHROPLASTY  "Right 04/01/2015    HIP CLOSED REDUCTION Left 1/25/2019    Procedure: LT. HIP CLOSED REDUCTION;  Surgeon: Domenico Peñaloza MD;  Location: [facility];  Service: Orthopedics    HYSTERECTOMY  1986    JOINT REPLACEMENT      KNEE SURGERY Bilateral     2007 & 2008    LUNG SURGERY  1965    TONSILLECTOMY      TOTAL HIP ARTHROPLASTY Left 1/23/2019    Procedure: LEFT TOTAL HIP ARTHROPLASTY MARICRUZ NAVIGATION;  Surgeon: Domenico Peñaloza MD;  Location: [facility];  Service: Orthopedics       Review of Systems   Constitutional: Negative.    Respiratory: Negative.  Negative for shortness of breath.    Cardiovascular: Negative.  Negative for chest pain, palpitations and leg swelling.   Genitourinary: Negative.    Musculoskeletal:  Positive for arthralgias (left shoulder, left arm; several months, no falls/injuries).   Neurological:  Negative for numbness.       No Known Allergies    Objective     Vitals:    04/09/24 1425   BP: 110/62   Pulse: 86   Resp: 16   Temp: 98.2 °F (36.8 °C)   SpO2: 96%   Weight: 62.1 kg (137 lb)   Height: 160 cm (62.99\")     Body mass index is 24.27 kg/m².    Physical Exam  Vitals reviewed.   HENT:      Head: Normocephalic.   Eyes:      Pupils: Pupils are equal, round, and reactive to light.   Cardiovascular:      Rate and Rhythm: Normal rate and regular rhythm.   Pulmonary:      Effort: Pulmonary effort is normal.      Breath sounds: Normal breath sounds.   Musculoskeletal:      Left shoulder: No swelling, deformity, effusion, tenderness or bony tenderness. Decreased range of motion. Normal pulse.      Left upper arm: No swelling, edema, deformity, lacerations, tenderness or bony tenderness.      Comments: Uses walker for ambulation   Skin:     General: Skin is warm and dry.      Coloration: Skin is pale.      Comments: Multiple moles to face, neck, braxton. Arms  Ingrown toenails braxton.   Neurological:      Mental Status: She is alert.           Current Outpatient Medications:     atorvastatin " (LIPITOR) 80 MG tablet, Take 1 tablet by mouth Every Night., Disp: 90 tablet, Rfl: 0    BIOTIN PO, Take  by mouth Daily., Disp: , Rfl:     Calcium Carbonate-Vit D-Min (CALCIUM 1200 PO), Take  by mouth 2 (Two) Times a Day., Disp: , Rfl:     carvedilol (COREG) 3.125 MG tablet, Take 1 tablet by mouth Every 12 (Twelve) Hours. Please call the office and make a follow up appointment, Disp: 180 tablet, Rfl: 0    Cholecalciferol (VITAMIN D3) 125 MCG (5000 UT) capsule capsule, Take 1 capsule by mouth Daily., Disp: , Rfl:     lisinopril (PRINIVIL,ZESTRIL) 5 MG tablet, Take 1 tablet by mouth Daily for 90 days., Disp: 90 tablet, Rfl: 0    Multiple Vitamin (MULTI VITAMIN DAILY PO), Take  by mouth Daily. Centrum silver, Disp: , Rfl:     VITAMIN E PO, Take  by mouth Daily., Disp: , Rfl:       Diagnoses and all orders for this visit:    1. Essential hypertension (Primary)  -     CBC & Differential  -     TSH    2. Hyperlipidemia, unspecified hyperlipidemia type  -     atorvastatin (LIPITOR) 80 MG tablet; Take 1 tablet by mouth Every Night.  Dispense: 90 tablet; Refill: 0  -     Lipid Panel With LDL / HDL Ratio    3. Cirrhosis, non-alcoholic  -     Comprehensive Metabolic Panel    4. Elevated fasting glucose  -     Hemoglobin A1c    5. Iron deficiency anemia, unspecified iron deficiency anemia type    6. Ingrown toenail  Comments:  braxton. feet  Orders:  -     Ambulatory Referral to Podiatry    7. Numerous moles  -     Ambulatory Referral to Dermatology    8. Arthralgia of left shoulder region  -     XR Shoulder 2+ View Left; Future    9. Encounter for osteoporosis screening in asymptomatic postmenopausal patient  -     DEXA Bone Density Axial    Other orders  -     Iron Profile  -     Vitamin B12 & Folate  -     Hepatitis C Antibody  -     Gamma GT  -     Ferritin  -     Written Authorization          Patient Instructions   Hypertension: stable, continue lisinopril 5 mg 1 tab daily    Hyperlipidemia: ordering labs for further eval,  continue atorvastatin 80 mg 1 tab daily; recommend Patient to eat a heart healthy low fat low sugar diet, drink plenty of water through out day, decrease soda intake.    Non alcoholic cirrhosis/elevated blood sugar: ordering labs for further eval.     Iron def anemia: ordering labs for further eval. Make sure to eat food through out day that is high in iron.    Ingrown toenails: referred to podiatrist for further eval.    Moles: referring to dermatologist for further eval.    Arthralgia of left shoulder: ordering xray for further eval. Recommend cold compress/ice pack 10-15 minutes at a time several times a day as needed, warm compress/heating pad 10-15 minutes at at time several times a day as needed, and over the counter biofreeze/lidocaine patches as needed (wash off from skin before using heating pad).    Return for recheck in 2 weeks.

## 2024-04-10 LAB
ALBUMIN SERPL-MCNC: 3.5 G/DL (ref 3.7–4.7)
ALBUMIN/GLOB SERPL: 1.3 {RATIO} (ref 1.2–2.2)
ALP SERPL-CCNC: 144 IU/L (ref 44–121)
ALT SERPL-CCNC: 22 IU/L (ref 0–32)
AST SERPL-CCNC: 42 IU/L (ref 0–40)
BASOPHILS # BLD AUTO: 0 X10E3/UL (ref 0–0.2)
BASOPHILS NFR BLD AUTO: 0 %
BILIRUB SERPL-MCNC: 1.2 MG/DL (ref 0–1.2)
BUN SERPL-MCNC: 15 MG/DL (ref 8–27)
BUN/CREAT SERPL: 15 (ref 12–28)
CALCIUM SERPL-MCNC: 9.5 MG/DL (ref 8.7–10.3)
CHLORIDE SERPL-SCNC: 105 MMOL/L (ref 96–106)
CHOLEST SERPL-MCNC: 160 MG/DL (ref 100–199)
CO2 SERPL-SCNC: 22 MMOL/L (ref 20–29)
CREAT SERPL-MCNC: 1.03 MG/DL (ref 0.57–1)
EGFRCR SERPLBLD CKD-EPI 2021: 53 ML/MIN/1.73
EOSINOPHIL # BLD AUTO: 0.1 X10E3/UL (ref 0–0.4)
EOSINOPHIL NFR BLD AUTO: 3 %
ERYTHROCYTE [DISTWIDTH] IN BLOOD BY AUTOMATED COUNT: 15.4 % (ref 11.7–15.4)
GLOBULIN SER CALC-MCNC: 2.7 G/DL (ref 1.5–4.5)
GLUCOSE SERPL-MCNC: 287 MG/DL (ref 70–99)
HBA1C MFR BLD: 6 % (ref 4.8–5.6)
HCT VFR BLD AUTO: 38.4 % (ref 34–46.6)
HDLC SERPL-MCNC: 46 MG/DL
HGB BLD-MCNC: 12.7 G/DL (ref 11.1–15.9)
IMM GRANULOCYTES # BLD AUTO: 0 X10E3/UL (ref 0–0.1)
IMM GRANULOCYTES NFR BLD AUTO: 0 %
LDLC SERPL CALC-MCNC: 96 MG/DL (ref 0–99)
LDLC/HDLC SERPL: 2.1 RATIO (ref 0–3.2)
LYMPHOCYTES # BLD AUTO: 0.8 X10E3/UL (ref 0.7–3.1)
LYMPHOCYTES NFR BLD AUTO: 27 %
MCH RBC QN AUTO: 30.6 PG (ref 26.6–33)
MCHC RBC AUTO-ENTMCNC: 33.1 G/DL (ref 31.5–35.7)
MCV RBC AUTO: 93 FL (ref 79–97)
MONOCYTES # BLD AUTO: 0.3 X10E3/UL (ref 0.1–0.9)
MONOCYTES NFR BLD AUTO: 9 %
MORPHOLOGY BLD-IMP: ABNORMAL
NEUTROPHILS # BLD AUTO: 1.9 X10E3/UL (ref 1.4–7)
NEUTROPHILS NFR BLD AUTO: 61 %
PLATELET # BLD AUTO: 78 X10E3/UL (ref 150–450)
POTASSIUM SERPL-SCNC: 4.5 MMOL/L (ref 3.5–5.2)
PROT SERPL-MCNC: 6.2 G/DL (ref 6–8.5)
RBC # BLD AUTO: 4.15 X10E6/UL (ref 3.77–5.28)
SODIUM SERPL-SCNC: 141 MMOL/L (ref 134–144)
TRIGL SERPL-MCNC: 99 MG/DL (ref 0–149)
TSH SERPL DL<=0.005 MIU/L-ACNC: 3.04 UIU/ML (ref 0.45–4.5)
VLDLC SERPL CALC-MCNC: 18 MG/DL (ref 5–40)
WBC # BLD AUTO: 3 X10E3/UL (ref 3.4–10.8)

## 2024-04-12 ENCOUNTER — PATIENT ROUNDING (BHMG ONLY) (OUTPATIENT)
Dept: FAMILY MEDICINE CLINIC | Facility: CLINIC | Age: 89
End: 2024-04-12

## 2024-04-12 LAB
FERRITIN SERPL-MCNC: 97 NG/ML (ref 15–150)
FOLATE SERPL-MCNC: >20 NG/ML
GGT SERPL-CCNC: 124 IU/L (ref 0–60)
HCV IGG SERPL QL IA: NON REACTIVE
IRON SATN MFR SERPL: 42 % (ref 15–55)
IRON SERPL-MCNC: 117 UG/DL (ref 27–139)
TIBC SERPL-MCNC: 280 UG/DL (ref 250–450)
UIBC SERPL-MCNC: 163 UG/DL (ref 118–369)
VIT B12 SERPL-MCNC: 703 PG/ML (ref 232–1245)
WRITTEN AUTHORIZATION: NORMAL

## 2024-04-15 ENCOUNTER — HOSPITAL ENCOUNTER (OUTPATIENT)
Dept: GENERAL RADIOLOGY | Facility: HOSPITAL | Age: 89
Discharge: HOME | End: 2024-04-15
Admitting: NURSE PRACTITIONER
Payer: MEDICARE

## 2024-04-15 DIAGNOSIS — M25.512 ARTHRALGIA OF LEFT SHOULDER REGION: ICD-10-CM

## 2024-04-15 PROBLEM — D22.9 NUMEROUS MOLES: Status: ACTIVE | Noted: 2024-04-15

## 2024-04-15 PROBLEM — L60.0 INGROWN TOENAIL: Status: ACTIVE | Noted: 2024-04-15

## 2024-04-15 PROCEDURE — 73030 X-RAY EXAM OF SHOULDER: CPT

## 2024-04-15 NOTE — PATIENT INSTRUCTIONS
Hypertension: stable, continue lisinopril 5 mg 1 tab daily    Hyperlipidemia: ordering labs for further eval, continue atorvastatin 80 mg 1 tab daily; recommend Patient to eat a heart healthy low fat low sugar diet, drink plenty of water through out day, decrease soda intake.    Non alcoholic cirrhosis/elevated blood sugar: ordering labs for further eval.     Iron def anemia: ordering labs for further eval. Make sure to eat food through out day that is high in iron.    Ingrown toenails: referred to podiatrist for further eval.    Moles: referring to dermatologist for further eval.    Arthralgia of left shoulder: ordering xray for further eval. Recommend cold compress/ice pack 10-15 minutes at a time several times a day as needed, warm compress/heating pad 10-15 minutes at at time several times a day as needed, and over the counter biofreeze/lidocaine patches as needed (wash off from skin before using heating pad).

## 2024-04-16 ENCOUNTER — PATIENT MESSAGE (OUTPATIENT)
Dept: FAMILY MEDICINE CLINIC | Facility: CLINIC | Age: 89
End: 2024-04-16
Payer: MEDICARE

## 2024-04-17 ENCOUNTER — OFFICE VISIT (OUTPATIENT)
Dept: FAMILY MEDICINE CLINIC | Facility: CLINIC | Age: 89
End: 2024-04-17
Payer: MEDICARE

## 2024-04-17 VITALS
WEIGHT: 137 LBS | SYSTOLIC BLOOD PRESSURE: 128 MMHG | DIASTOLIC BLOOD PRESSURE: 74 MMHG | OXYGEN SATURATION: 96 % | HEIGHT: 63 IN | BODY MASS INDEX: 24.27 KG/M2 | TEMPERATURE: 98.2 F | RESPIRATION RATE: 16 BRPM | HEART RATE: 77 BPM

## 2024-04-17 DIAGNOSIS — E78.5 HYPERLIPIDEMIA, UNSPECIFIED HYPERLIPIDEMIA TYPE: ICD-10-CM

## 2024-04-17 DIAGNOSIS — M19.012 PRIMARY OSTEOARTHRITIS OF LEFT SHOULDER: ICD-10-CM

## 2024-04-17 DIAGNOSIS — R91.1 LUNG NODULE: ICD-10-CM

## 2024-04-17 DIAGNOSIS — Z00.00 MEDICARE ANNUAL WELLNESS VISIT, SUBSEQUENT: Primary | ICD-10-CM

## 2024-04-17 DIAGNOSIS — Z74.09 MOBILITY IMPAIRED: ICD-10-CM

## 2024-04-17 DIAGNOSIS — Z91.81 AT MODERATE RISK FOR FALL: ICD-10-CM

## 2024-04-17 DIAGNOSIS — R53.1 WEAKNESS: ICD-10-CM

## 2024-04-17 RX ORDER — EZETIMIBE 10 MG/1
10 TABLET ORAL DAILY
Qty: 90 TABLET | Refills: 1 | Status: SHIPPED | OUTPATIENT
Start: 2024-04-17

## 2024-04-17 RX ORDER — ATORVASTATIN CALCIUM 80 MG/1
80 TABLET, FILM COATED ORAL NIGHTLY
Qty: 90 TABLET | Refills: 1 | Status: SHIPPED | OUTPATIENT
Start: 2024-04-17

## 2024-04-17 NOTE — PROGRESS NOTES
The ABCs of the Annual Wellness Visit  Subsequent Medicare Wellness Visit    Subjective    Pamela Durham is a 88 y.o. female who presents for a Subsequent Medicare Wellness Visit.    The following portions of the patient's history were reviewed and   updated as appropriate: allergies, current medications, past family history, past medical history, past social history, past surgical history, and problem list.    Compared to one year ago, the patient feels her physical   health is worse. Left arm/shoulder    Compared to one year ago, the patient feels her mental   health is the same.    Recent Hospitalizations:  She was not admitted to the hospital during the last year.       Current Medical Providers:  Patient Care Team:  Jeanne Patel APRN as PCP - General (Family Medicine)  Leyla Gutiérrez MD as Consulting Physician (Hematology and Oncology)  Desean Earl MD as Referring Physician (Cardiology)  Javier Peñaloza MD as Consulting Physician (Gastroenterology)    Outpatient Medications Prior to Visit   Medication Sig Dispense Refill    BIOTIN PO Take  by mouth Daily.      Calcium Carbonate-Vit D-Min (CALCIUM 1200 PO) Take  by mouth 2 (Two) Times a Day.      carvedilol (COREG) 3.125 MG tablet Take 1 tablet by mouth Every 12 (Twelve) Hours. Please call the office and make a follow up appointment 180 tablet 0    Cholecalciferol (VITAMIN D3) 125 MCG (5000 UT) capsule capsule Take 1 capsule by mouth Daily.      lisinopril (PRINIVIL,ZESTRIL) 5 MG tablet Take 1 tablet by mouth Daily for 90 days. 90 tablet 0    Multiple Vitamin (MULTI VITAMIN DAILY PO) Take  by mouth Daily. Centrum silver      VITAMIN E PO Take  by mouth Daily.      atorvastatin (LIPITOR) 80 MG tablet Take 1 tablet by mouth Every Night. 90 tablet 0     No facility-administered medications prior to visit.       No opioid medication identified on active medication list. I have reviewed chart for other potential  high risk medication/s and harmful  "drug interactions in the elderly.        Aspirin is not on active medication list.  Aspirin use is not indicated based on review of current medical condition/s. Risk of harm outweighs potential benefits.  .    Patient Active Problem List   Diagnosis    Status post total replacement of left hip    Lumbar canal stenosis    Lumbar radiculopathy    Essential hypertension    Hyperlipemia    Elevated fasting glucose    NSTEMI (non-ST elevated myocardial infarction)    Iron deficiency anemia    General weakness    Lung nodule    Superficial thrombophlebitis of left upper extremity    Pancytopenia    Leukopenia    Coronary artery disease involving native coronary artery of native heart without angina pectoris    Abnormal CT of the abdomen    Cirrhosis, non-alcoholic    Arthritis of left hip    Cognitive communication deficit    Gastro-esophageal reflux disease without esophagitis    Ingrown toenail    Numerous moles    Arthralgia of left shoulder region     Advance Care Planning   Advance Care Planning     Advance Directive is on file.  ACP discussion was held with the patient during this visit. Patient has an advance directive in EMR which is still valid.      Objective    Vitals:    04/17/24 1506   BP: 128/74   Pulse: 77   Resp: 16   Temp: 98.2 °F (36.8 °C)   SpO2: 96%   Weight: 62.1 kg (137 lb)   Height: 160 cm (62.99\")     Estimated body mass index is 24.27 kg/m² as calculated from the following:    Height as of this encounter: 160 cm (62.99\").    Weight as of this encounter: 62.1 kg (137 lb).    BMI is within normal parameters. No other follow-up for BMI required.      Does the patient have evidence of cognitive impairment? Yes    Recent Results (from the past 336 hour(s))   CBC & Differential    Collection Time: 04/09/24  3:03 PM    Specimen: Blood   Result Value Ref Range    WBC 3.0 (L) 3.4 - 10.8 x10E3/uL    RBC 4.15 3.77 - 5.28 x10E6/uL    Hemoglobin 12.7 11.1 - 15.9 g/dL    Hematocrit 38.4 34.0 - 46.6 %    MCV 93 " 79 - 97 fL    MCH 30.6 26.6 - 33.0 pg    MCHC 33.1 31.5 - 35.7 g/dL    RDW 15.4 11.7 - 15.4 %    Platelets 78 (L) 150 - 450 x10E3/uL    Neutrophil Rel % 61 Not Estab. %    Lymphocyte Rel % 27 Not Estab. %    Monocyte Rel % 9 Not Estab. %    Eosinophil Rel % 3 Not Estab. %    Basophil Rel % 0 Not Estab. %    Neutrophils Absolute 1.9 1.4 - 7.0 x10E3/uL    Lymphocytes Absolute 0.8 0.7 - 3.1 x10E3/uL    Monocytes Absolute 0.3 0.1 - 0.9 x10E3/uL    Eosinophils Absolute 0.1 0.0 - 0.4 x10E3/uL    Basophils Absolute 0.0 0.0 - 0.2 x10E3/uL    Immature Granulocyte Rel % 0 Not Estab. %    Immature Grans Absolute 0.0 0.0 - 0.1 x10E3/uL    Hematology Comments: Note:    Lipid Panel With LDL / HDL Ratio    Collection Time: 04/09/24  3:03 PM    Specimen: Blood   Result Value Ref Range    Total Cholesterol 160 100 - 199 mg/dL    Triglycerides 99 0 - 149 mg/dL    HDL Cholesterol 46 >39 mg/dL    VLDL Cholesterol Harman 18 5 - 40 mg/dL    LDL Chol Calc (NIH) 96 0 - 99 mg/dL    LDL/HDL RATIO 2.1 0.0 - 3.2 ratio   Comprehensive Metabolic Panel    Collection Time: 04/09/24  3:03 PM    Specimen: Blood   Result Value Ref Range    Glucose 287 (H) 70 - 99 mg/dL    BUN 15 8 - 27 mg/dL    Creatinine 1.03 (H) 0.57 - 1.00 mg/dL    EGFR Result 53 (L) >59 mL/min/1.73    BUN/Creatinine Ratio 15 12 - 28    Sodium 141 134 - 144 mmol/L    Potassium 4.5 3.5 - 5.2 mmol/L    Chloride 105 96 - 106 mmol/L    Total CO2 22 20 - 29 mmol/L    Calcium 9.5 8.7 - 10.3 mg/dL    Total Protein 6.2 6.0 - 8.5 g/dL    Albumin 3.5 (L) 3.7 - 4.7 g/dL    Globulin 2.7 1.5 - 4.5 g/dL    A/G Ratio 1.3 1.2 - 2.2    Total Bilirubin 1.2 0.0 - 1.2 mg/dL    Alkaline Phosphatase 144 (H) 44 - 121 IU/L    AST (SGOT) 42 (H) 0 - 40 IU/L    ALT (SGPT) 22 0 - 32 IU/L   Hemoglobin A1c    Collection Time: 04/09/24  3:03 PM    Specimen: Blood   Result Value Ref Range    Hemoglobin A1C 6.0 (H) 4.8 - 5.6 %   TSH    Collection Time: 04/09/24  3:03 PM    Specimen: Blood   Result Value Ref Range     TSH 3.040 0.450 - 4.500 uIU/mL   Iron Profile    Collection Time: 24  3:03 PM   Result Value Ref Range    TIBC 280 250 - 450 ug/dL    UIBC 163 118 - 369 ug/dL    Iron 117 27 - 139 ug/dL    Iron Saturation 42 15 - 55 %   Vitamin B12 & Folate    Collection Time: 24  3:03 PM   Result Value Ref Range    Vitamin B-12 703 232 - 1,245 pg/mL    Folate >20.0 >3.0 ng/mL   Hepatitis C Antibody    Collection Time: 24  3:03 PM   Result Value Ref Range    Hep C Virus Ab Non Reactive Non Reactive   Gamma GT    Collection Time: 24  3:03 PM   Result Value Ref Range     (H) 0 - 60 IU/L   Ferritin    Collection Time: 24  3:03 PM   Result Value Ref Range    Ferritin 97 15 - 150 ng/mL   Written Authorization    Collection Time: 24  3:03 PM   Result Value Ref Range    Written Authorization Comment            HEALTH RISK ASSESSMENT    Smoking Status:  Social History     Tobacco Use   Smoking Status Never   Smokeless Tobacco Never   Tobacco Comments    occ caffeine use     Alcohol Consumption:  Social History     Substance and Sexual Activity   Alcohol Use No     Fall Risk Screen:    NEELAMADI Fall Risk Assessment was completed, and patient is at MODERATE risk for falls. Assessment completed on:2024    Depression Screenin/17/2024     3:14 PM   PHQ-2/PHQ-9 Depression Screening   Little Interest or Pleasure in Doing Things 0-->not at all   Feeling Down, Depressed or Hopeless 0-->not at all   PHQ-9: Brief Depression Severity Measure Score 0       Health Habits and Functional and Cognitive Screenin/17/2024     3:10 PM   Functional & Cognitive Status   Do you have difficulty preparing food and eating? Yes   Do you have difficulty bathing yourself, getting dressed or grooming yourself? No   Do you have difficulty using the toilet? No   Do you have difficulty moving around from place to place? Yes   Do you have trouble with steps or getting out of a bed or a chair? Yes   Current Diet  Unhealthy Diet   Dental Exam Up to date   Eye Exam Up to date   Exercise (times per week) 0 times per week   Current Exercises Include No Regular Exercise   Do you need help using the phone?  No   Are you deaf or do you have serious difficulty hearing?  No   Do you need help to go to places out of walking distance? No   Do you need help shopping? No   Do you need help preparing meals?  No   Do you need help with housework?  Yes   Do you need help with laundry? No   Do you need help taking your medications? No   Do you need help managing money? Yes   Do you ever drive or ride in a car without wearing a seat belt? No   Have you felt unusual stress, anger or loneliness in the last month? No   Who do you live with? Alone   If you need help, do you have trouble finding someone available to you? No   Have you been bothered in the last four weeks by sexual problems? No   Do you have difficulty concentrating, remembering or making decisions? No       Age-appropriate Screening Schedule:  Refer to the list below for future screening recommendations based on patient's age, sex and/or medical conditions. Orders for these recommended tests are listed in the plan section. The patient has been provided with a written plan.    Health Maintenance   Topic Date Due    TDAP/TD VACCINES (1 - Tdap) Never done    Hepatitis B (1 of 3 - Risk 3-dose series) Never done    RSV Vaccine - Adults (1 - 1-dose 60+ series) Never done    ZOSTER VACCINE (2 of 3) 11/06/2006    DXA SCAN  02/28/2020    COVID-19 Vaccine (5 - 2023-24 season) 09/01/2023    INFLUENZA VACCINE  08/01/2024    LIPID PANEL  04/09/2025    ANNUAL WELLNESS VISIT  04/17/2025    Pneumococcal Vaccine 65+  Completed                  CMS Preventative Services Quick Reference  Risk Factors Identified During Encounter  Fall Risk-High or Moderate: Discussed Fall Prevention in the home  Immunizations Discussed/Encouraged: Tdap, Hepatitis B Vaccine/Series, Shingrix, COVID19, and RSV  "(Respiratory Syncytial Virus)  Dental Screening Recommended  Vision Screening Recommended  The above risks/problems have been discussed with the patient.  Pertinent information has been shared with the patient in the After Visit Summary.  An After Visit Summary and PPPS were made available to the patient.    Follow Up:   Next Medicare Wellness visit to be scheduled in 1 year.       Additional E&M Note during same encounter follows:  Patient has multiple medical problems which are significant and separately identifiable that require additional work above and beyond the Medicare Wellness Visit.      Chief Complaint  Medicare Wellness-subsequent    Subjective        HPI  Pamela Durham is also being seen today for hyperlipidemia, leukopenia, thrombocytopenia, non-alcoholic cirrhosis, OA of shoulder, and lung nodule.          Objective   Vital Signs:  /74   Pulse 77   Temp 98.2 °F (36.8 °C)   Resp 16   Ht 160 cm (62.99\")   Wt 62.1 kg (137 lb)   SpO2 96%   BMI 24.27 kg/m²     Physical Exam  Vitals reviewed.   HENT:      Head: Normocephalic.   Eyes:      Pupils: Pupils are equal, round, and reactive to light.   Neck:      Vascular: No carotid bruit.   Cardiovascular:      Rate and Rhythm: Normal rate and regular rhythm.      Pulses: Normal pulses.   Pulmonary:      Effort: Pulmonary effort is normal.      Breath sounds: Normal breath sounds.   Musculoskeletal:         General: Normal range of motion.      Right lower leg: No edema.      Left lower leg: Edema present.   Skin:     General: Skin is warm and dry.   Neurological:      Mental Status: She is alert and oriented to person, place, and time.   Psychiatric:         Behavior: Behavior normal.                       Assessment and Plan   Diagnoses and all orders for this visit:    1. Medicare annual wellness visit, subsequent (Primary)    2. Primary osteoarthritis of left shoulder  -     Ambulatory Referral to Physical Therapy Evaluate and treat    3. " Hyperlipidemia, unspecified hyperlipidemia type  -     ezetimibe (Zetia) 10 MG tablet; Take 1 tablet by mouth Daily.  Dispense: 90 tablet; Refill: 1  -     atorvastatin (LIPITOR) 80 MG tablet; Take 1 tablet by mouth Every Night.  Dispense: 90 tablet; Refill: 1    4. Lung nodule  -     CT Chest With Contrast; Future    5. At moderate risk for fall  -     Ambulatory Referral to Physical Therapy Evaluate and treat    6. Mobility impaired  -     Ambulatory Referral to Physical Therapy Evaluate and treat    7. Weakness  -     Ambulatory Referral to Physical Therapy Evaluate and treat    OA shoulder/mod fall risk/mobility impaired/weakness: referring to physical therapy for eval and tx. Recommend using otc voltren gel for shoulder prn.     Hyperlipidemia: adding ezetimibe 10 mg 1 tab daily, continue atorvastatin 80 mg daily; recommend eating a heart healthy low fat low sugar low sodium diet and to drink plenty of water through out the day.    Lung nodule: ordering ct chest for further eval of lung nodule.          Follow Up   Return for fasting labs in 6 months, recheck in 6 months.  Patient was given instructions and counseling regarding her condition or for health maintenance advice. Please see specific information pulled into the AVS if appropriate.

## 2024-04-18 ENCOUNTER — TELEPHONE (OUTPATIENT)
Dept: FAMILY MEDICINE CLINIC | Facility: CLINIC | Age: 89
End: 2024-04-18

## 2024-04-18 PROBLEM — R93.5 ABNORMAL CT OF THE ABDOMEN: Status: RESOLVED | Noted: 2017-09-08 | Resolved: 2024-04-18

## 2024-04-18 PROBLEM — R53.1 GENERAL WEAKNESS: Status: RESOLVED | Noted: 2017-09-08 | Resolved: 2024-04-18

## 2024-04-18 NOTE — TELEPHONE ENCOUNTER
Caller: Jesus Durham    Relationship: Emergency Contact    Best call back number: [phone]    What is the best time to reach you: ANYTIME    Who are you requesting to speak with (clinical staff, provider,  specific staff member): YUSEF GARZA    What was the call regarding: PATIENT'S SON STATES THAT PATIENT HAS SOME EDEMA GOING ON IN BOTH HER ANKLES AND IS WANTING TO KNOW WHAT YUSEF GARZA WOULD ADVISE. HE STATED THAT THERE IS NO PAIN OR DIFFICULTY WALKING AND NO FREQUENT URINATION. PLEASE ADVISE.

## 2024-04-19 ENCOUNTER — HOSPITAL ENCOUNTER (OUTPATIENT)
Facility: HOSPITAL | Age: 89
Discharge: HOME | End: 2024-04-19
Payer: MEDICARE

## 2024-04-19 NOTE — TELEPHONE ENCOUNTER
ATTEMPTED TO CALL PT'S SON (ON VERBAL) REGARDING SEFERINO'S MESSAGE, ADVISED HER MESSAGE, LEFT AN VOICEMAIL.

## 2024-04-22 NOTE — TELEPHONE ENCOUNTER
Hub staff attempted to follow warm transfer process and was unsuccessful     Caller: Jesus Durham    Relationship to patient: Emergency Contact    Best call back number: [phone]    Patient is needing: CALLING BACK RETURNING A CALL ABOUT A REFERRAL.  PLEASE GIVE HIM A CALL BACK.

## 2024-04-23 NOTE — TELEPHONE ENCOUNTER
SPOKE TO PT'S SON AND GAVE HIM # TO [facility] [place] [facility] # TO CALL. [place]

## 2024-05-15 ENCOUNTER — HOSPITAL ENCOUNTER (OUTPATIENT)
Facility: HOSPITAL | Age: 89
Discharge: HOME | End: 2024-05-15
Payer: MEDICARE

## 2024-05-15 DIAGNOSIS — R91.1 LUNG NODULE: ICD-10-CM

## 2024-05-15 PROCEDURE — 77080 DXA BONE DENSITY AXIAL: CPT

## 2024-05-15 PROCEDURE — 25510000001 IOPAMIDOL 61 % SOLUTION: Performed by: NURSE PRACTITIONER

## 2024-05-15 PROCEDURE — 71260 CT THORAX DX C+: CPT

## 2024-05-15 RX ADMIN — IOPAMIDOL 100 ML: 612 INJECTION, SOLUTION INTRAVENOUS at 15:43

## 2024-05-16 ENCOUNTER — TELEPHONE (OUTPATIENT)
Dept: FAMILY MEDICINE CLINIC | Facility: CLINIC | Age: 89
End: 2024-05-16
Payer: MEDICARE

## 2024-05-16 NOTE — TELEPHONE ENCOUNTER
Attempted to call pt's son, left a voicemail on lisa's instructions regarding the dexa scan results.

## 2024-05-21 RX ORDER — CARVEDILOL 3.12 MG/1
TABLET ORAL
Qty: 180 TABLET | Refills: 0 | Status: SHIPPED | OUTPATIENT
Start: 2024-05-21

## 2024-06-07 ENCOUNTER — OFFICE VISIT (OUTPATIENT)
Dept: CARDIOLOGY | Facility: CLINIC | Age: 89
End: 2024-06-07
Payer: MEDICARE

## 2024-06-07 VITALS — HEIGHT: 63 IN | BODY MASS INDEX: 24.03 KG/M2 | WEIGHT: 135.6 LBS

## 2024-06-07 DIAGNOSIS — E78.5 HYPERLIPIDEMIA, UNSPECIFIED HYPERLIPIDEMIA TYPE: ICD-10-CM

## 2024-06-07 DIAGNOSIS — I25.10 CORONARY ARTERY DISEASE INVOLVING NATIVE CORONARY ARTERY OF NATIVE HEART WITHOUT ANGINA PECTORIS: Primary | ICD-10-CM

## 2024-06-07 PROCEDURE — 99214 OFFICE O/P EST MOD 30 MIN: CPT | Performed by: NURSE PRACTITIONER

## 2024-06-07 PROCEDURE — 93000 ELECTROCARDIOGRAM COMPLETE: CPT | Performed by: NURSE PRACTITIONER

## 2024-06-07 RX ORDER — CARVEDILOL 3.12 MG/1
3.12 TABLET ORAL 2 TIMES DAILY WITH MEALS
Qty: 180 TABLET | Refills: 3 | Status: SHIPPED | OUTPATIENT
Start: 2024-06-07

## 2024-06-07 RX ORDER — ATORVASTATIN CALCIUM 40 MG/1
40 TABLET, FILM COATED ORAL NIGHTLY
Qty: 90 TABLET | Refills: 3 | Status: SHIPPED | OUTPATIENT
Start: 2024-06-07

## 2024-06-07 RX ORDER — LISINOPRIL 5 MG/1
5 TABLET ORAL DAILY
Qty: 90 TABLET | Refills: 3 | Status: SHIPPED | OUTPATIENT
Start: 2024-06-07

## 2024-06-07 NOTE — PROGRESS NOTES
Date of Office Visit: 2024  Encounter Provider: YUSEF Hernadez  Place of Service: [facility] CARDIOLOGY  Patient Name: Pamela Durham  :1935    No chief complaint on file.  : coronary artery disease    HPI: Pamela Durham is a 88 y.o. female who is a patient of  Dr. Earl.  New to me today and presents the office for follow-up and med refills.  The last seen in the office was 2 years ago by Dr. Earl.  She has a history of coronary artery disease with  of circumflex, 40% stenosis of RCA and OM1 branch that filled via left to left collaterals, 60 to 70% LAD lesion.  No intervention was performed secondary to the fact that she was asymptomatic and because she was high risk of bleeding.  She is known to have multiple colonic angiodysplastic lesions that were previously treated with argon plasma coagulation.  Has been medically managed and doing well.    Last office visit , she was not having any issues.  Aspirin and statin were continued as well as carvedilol therapy.  On this office visit, patient has continued her medications however she has not taken her atorvastatin in quite a few months.  He has no complaints of chest pain, pressure, lightheadedness, shortness of air or edema.  Blood pressure is well-controlled.  EKG stable.    Previous testing and notes have been reviewed by me.   Past Medical History:   Diagnosis Date    Anemia     Cirrhosis, non-alcoholic     CKD (chronic kidney disease) stage 3, GFR 30-59 ml/min 2019    Gallbladder stone without cholecystitis or obstruction     gallstone seen on ultrasound     GERD (gastroesophageal reflux disease)     Health care maintenance     Hyperlipidemia     Hypertension     Lumbar canal stenosis     Lumbar radiculopathy     MI (myocardial infarction) 2016    NON-STEMI    NSTEMI (non-ST elevated myocardial infarction)     Osteoarthritis     Thrombophlebitis of superficial veins of upper  extremities     LEFT       Past Surgical History:   Procedure Laterality Date    CARDIAC CATHETERIZATION N/A 3/1/2017    Procedure: Coronary angiography;  Surgeon: Desean Earl MD;  Location: [facility] CATH INVASIVE LOCATION;  Service:     COLONOSCOPY N/A 12/27/2016    Procedure: COLONOSCOPY INTO CECUM WITH ARGON PLASMA COAGULATION;  Surgeon: Javier Peñaloza MD;  Location: [facility] ENDOSCOPY;  Service:     ENDOSCOPY N/A 12/27/2016    Procedure: ESOPHAGOGASTRODUODENOSCOPY WITH COLD BIOPSIES;  Surgeon: Javier Peñaloza MD;  Location: [facility] ENDOSCOPY;  Service:     HIP ARTHROPLASTY Right 04/01/2015    HIP CLOSED REDUCTION Left 1/25/2019    Procedure: LT. HIP CLOSED REDUCTION;  Surgeon: Domenico Peñaloza MD;  Location: [facility] MAIN OR;  Service: Orthopedics    HYSTERECTOMY  1986    JOINT REPLACEMENT      KNEE SURGERY Bilateral     2007 & 2008    LUNG SURGERY  1965    TONSILLECTOMY      TOTAL HIP ARTHROPLASTY Left 1/23/2019    Procedure: LEFT TOTAL HIP ARTHROPLASTY MARICRUZ NAVIGATION;  Surgeon: Domenico Peñaloza MD;  Location: [facility] MAIN OR;  Service: Orthopedics       Social History     Socioeconomic History    Marital status:     Years of education: College   Tobacco Use    Smoking status: Never     Passive exposure: Never    Smokeless tobacco: Never    Tobacco comments:     occ caffeine use   Vaping Use    Vaping status: Never Used   Substance and Sexual Activity    Alcohol use: No    Drug use: No    Sexual activity: Defer       Family History   Problem Relation Age of Onset    Hypertension Other     Heart disease Other     Liver cancer Brother     Ovarian cancer Daughter         diagnosed 2012    No Known Problems Mother     Heart disease Father     Heart attack Father     Hypertension Father     Cancer Maternal Grandmother     No Known Problems Maternal Grandfather     No Known Problems Paternal Grandmother     No Known Problems Paternal Grandfather     Malig Hyperthermia Neg Hx        Review of Systems  "  Constitutional: Negative.   HENT: Negative.     Eyes: Negative.    Cardiovascular: Negative.    Respiratory: Negative.     Endocrine: Negative.    Hematologic/Lymphatic: Negative.    Skin: Negative.    Musculoskeletal: Negative.    Gastrointestinal: Negative.    Genitourinary: Negative.    Neurological: Negative.    Psychiatric/Behavioral: Negative.     Allergic/Immunologic: Negative.        No Known Allergies      Current Outpatient Medications:     atorvastatin (LIPITOR) 40 MG tablet, Take 1 tablet by mouth Every Night., Disp: 90 tablet, Rfl: 3    BIOTIN PO, Take  by mouth Daily., Disp: , Rfl:     Calcium Carbonate-Vit D-Min (CALCIUM 1200 PO), Take  by mouth 2 (Two) Times a Day., Disp: , Rfl:     carvedilol (COREG) 3.125 MG tablet, Take 1 tablet by mouth 2 (Two) Times a Day With Meals., Disp: 180 tablet, Rfl: 3    Cholecalciferol (VITAMIN D3) 125 MCG (5000 UT) capsule capsule, Take 1 capsule by mouth Daily., Disp: , Rfl:     lisinopril (PRINIVIL,ZESTRIL) 5 MG tablet, Take 1 tablet by mouth Daily., Disp: 90 tablet, Rfl: 3    Multiple Vitamin (MULTI VITAMIN DAILY PO), Take  by mouth Daily. Centrum silver, Disp: , Rfl:     VITAMIN E PO, Take  by mouth Daily., Disp: , Rfl:       Objective:     Vitals:    06/07/24 1526   Weight: 61.5 kg (135 lb 9.6 oz)   Height: 160 cm (62.99\")     Body mass index is 24.03 kg/m².    PHYSICAL EXAM:    Constitutional:       Appearance: Healthy appearance. Not in distress.   Neck:      Vascular: No JVR. JVD normal.   Pulmonary:      Effort: Pulmonary effort is normal.      Breath sounds: Normal breath sounds. No wheezing. No rhonchi. No rales.   Chest:      Chest wall: Not tender to palpatation.   Cardiovascular:      PMI at left midclavicular line. Normal rate. Regular rhythm. Normal S1. Normal S2.       Murmurs: There is no murmur.      No gallop.  No click. No rub.   Pulses:     Intact distal pulses.   Edema:     Peripheral edema absent.   Abdominal:      General: Bowel sounds are " normal.      Palpations: Abdomen is soft.      Tenderness: There is no abdominal tenderness.   Musculoskeletal: Normal range of motion.         General: No tenderness. Skin:     General: Skin is warm and dry.   Neurological:      General: No focal deficit present.      Mental Status: Alert and oriented to person, place and time.           ECG 12 Lead    Date/Time: 6/7/2024 4:44 PM  Performed by: Sheri Bob APRN    Authorized by: Sheri Bob APRN  Comparison: compared with previous ECG from 5/16/2022  Similar to previous ECG  Rhythm: sinus rhythm  Rate: normal  BPM: 67  QRS axis: normal            Assessment:       Diagnosis Plan   1. Coronary artery disease involving native coronary artery of native heart without angina pectoris        2. Hyperlipidemia, unspecified hyperlipidemia type  atorvastatin (LIPITOR) 40 MG tablet        No orders of the defined types were placed in this encounter.         Plan:       Coronary artery disease:  left circumflex along with severely calcified LAD stenosis.  No angina.  On medical management with aspirin, carvedilol.  Will restart atorvastatin as patient has not been taking for some months.    2.  Hypertension: Controlled    3.  Hyperlipidemia: Last lipid panel shows LDL in the 90s.  She was not taking atorvastatin for quite some time.  Patient will start taking again.    Ms. Durham follow-up with Dr. Earl in 1 year.  She will call sooner for any questions or concerns.  Patient's son was present with her at this visit.         Your medication list            Accurate as of June 7, 2024  4:43 PM. If you have any questions, ask your nurse or doctor.                CHANGE how you take these medications        Instructions Last Dose Given Next Dose Due   atorvastatin 40 MG tablet  Commonly known as: LIPITOR  What changed:   medication strength  how much to take  Changed by: YUSEF Hernadez      Take 1 tablet by mouth Every Night.       carvedilol 3.125  MG tablet  Commonly known as: COREG  What changed: See the new instructions.  Changed by: YUSEF Hernadez      Take 1 tablet by mouth 2 (Two) Times a Day With Meals.              CONTINUE taking these medications        Instructions Last Dose Given Next Dose Due   BIOTIN PO      Take  by mouth Daily.       CALCIUM 1200 PO      Take  by mouth 2 (Two) Times a Day.       lisinopril 5 MG tablet  Commonly known as: PRINIVIL,ZESTRIL      Take 1 tablet by mouth Daily.       multivitamin tablet tablet  Commonly known as: THERAGRAN      Take  by mouth Daily. Centrum silver       vitamin D3 125 MCG (5000 UT) capsule capsule      Take 1 capsule by mouth Daily.       VITAMIN E PO      Take  by mouth Daily.              STOP taking these medications      ezetimibe 10 MG tablet  Commonly known as: Zetia  Stopped by: YUSEF Hernadez                  Where to Get Your Medications        These medications were [facility] to [id number] - [place], [place] - [address] [place] [facility] - [phone]  - [phone] [place] [address], [facility] [address]      Phone: [phone]   atorvastatin 40 MG tablet  carvedilol 3.125 MG tablet       You can get these medications from any pharmacy    Bring a paper prescription for each of these medications  lisinopril 5 MG tablet           As always, it has been a pleasure to participate in your patient's care.      Sincerely,       YUSEF Cowan

## 2024-08-19 RX ORDER — LISINOPRIL 5 MG/1
5 TABLET ORAL DAILY
Qty: 90 TABLET | Refills: 1 | Status: SHIPPED | OUTPATIENT
Start: 2024-08-19

## 2024-09-25 ENCOUNTER — APPOINTMENT (OUTPATIENT)
Dept: GENERAL RADIOLOGY | Facility: HOSPITAL | Age: 89
End: 2024-09-25
Payer: MEDICARE

## 2024-09-25 ENCOUNTER — HOSPITAL ENCOUNTER (OUTPATIENT)
Facility: HOSPITAL | Age: 89
Discharge: HOME | End: 2024-09-25
Attending: EMERGENCY MEDICINE | Admitting: EMERGENCY MEDICINE
Payer: MEDICARE

## 2024-09-25 VITALS
BODY MASS INDEX: 23.92 KG/M2 | HEIGHT: 63 IN | DIASTOLIC BLOOD PRESSURE: 91 MMHG | WEIGHT: 135 LBS | SYSTOLIC BLOOD PRESSURE: 146 MMHG | RESPIRATION RATE: 16 BRPM | OXYGEN SATURATION: 97 % | HEART RATE: 96 BPM | TEMPERATURE: 97.7 F

## 2024-09-25 DIAGNOSIS — T14.8XXA ABRASION: ICD-10-CM

## 2024-09-25 DIAGNOSIS — S80.02XA CONTUSION OF LEFT KNEE, INITIAL ENCOUNTER: Primary | ICD-10-CM

## 2024-09-25 DIAGNOSIS — M79.675 PAIN OF TOE OF LEFT FOOT: ICD-10-CM

## 2024-09-25 PROCEDURE — 99213 OFFICE O/P EST LOW 20 MIN: CPT | Performed by: PHYSICIAN ASSISTANT

## 2024-09-25 PROCEDURE — G0463 HOSPITAL OUTPT CLINIC VISIT: HCPCS | Performed by: PHYSICIAN ASSISTANT

## 2024-09-25 PROCEDURE — 73562 X-RAY EXAM OF KNEE 3: CPT

## 2024-09-25 PROCEDURE — 73660 X-RAY EXAM OF TOE(S): CPT

## 2024-09-25 NOTE — FSED PROVIDER NOTE
Subjective   History of Present Illness  Patient 78-year-old female that was sweeping the garage and fell prone, landing on her left knee.  She does not have any other injury including head and neck.  Did not injure her hands or upper extremities.  She has no other complaints at arrival.      Review of Systems   Constitutional: Negative.    HENT: Negative.     Eyes: Negative.    Respiratory: Negative.     Cardiovascular: Negative.    Genitourinary: Negative.    Musculoskeletal:  Positive for arthralgias.   Skin:  Positive for wound.   Neurological: Negative.    Psychiatric/Behavioral: Negative.     All other systems reviewed and are negative.      Past Medical History:   Diagnosis Date    Anemia     Cirrhosis, non-alcoholic     CKD (chronic kidney disease) stage 3, GFR 30-59 ml/min 1/24/2019    Gallbladder stone without cholecystitis or obstruction     gallstone seen on ultrasound 2007    GERD (gastroesophageal reflux disease)     Health care maintenance     Hyperlipidemia     Hypertension     Lumbar canal stenosis     Lumbar radiculopathy     MI (myocardial infarction) 12/25/2016    NON-STEMI    NSTEMI (non-ST elevated myocardial infarction)     Osteoarthritis     Thrombophlebitis of superficial veins of upper extremities     LEFT       No Known Allergies    Past Surgical History:   Procedure Laterality Date    CARDIAC CATHETERIZATION N/A 3/1/2017    Procedure: Coronary angiography;  Surgeon: Desean Earl MD;  Location: [facility] CATH INVASIVE LOCATION;  Service:     COLONOSCOPY N/A 12/27/2016    Procedure: COLONOSCOPY INTO CECUM WITH ARGON PLASMA COAGULATION;  Surgeon: Javier Peñaloza MD;  Location: [facility] ENDOSCOPY;  Service:     ENDOSCOPY N/A 12/27/2016    Procedure: ESOPHAGOGASTRODUODENOSCOPY WITH COLD BIOPSIES;  Surgeon: Javier Peñaloza MD;  Location: [facility] ENDOSCOPY;  Service:     HIP ARTHROPLASTY Right 04/01/2015    HIP CLOSED REDUCTION Left 1/25/2019    Procedure: LT. HIP CLOSED REDUCTION;  Surgeon:  Domenico Peñaloza MD;  Location: [facility] OR;  Service: Orthopedics    HYSTERECTOMY  1986    JOINT REPLACEMENT      KNEE SURGERY Bilateral     2007 & 2008    LUNG SURGERY  1965    TONSILLECTOMY      TOTAL HIP ARTHROPLASTY Left 1/23/2019    Procedure: LEFT TOTAL HIP ARTHROPLASTY MARICRUZ NAVIGATION;  Surgeon: Domenico Peñaloza MD;  Location: [facility] OR;  Service: Orthopedics       Family History   Problem Relation Age of Onset    Hypertension Other     Heart disease Other     Liver cancer Brother     Ovarian cancer Daughter         diagnosed 2012    No Known Problems Mother     Heart disease Father     Heart attack Father     Hypertension Father     Cancer Maternal Grandmother     No Known Problems Maternal Grandfather     No Known Problems Paternal Grandmother     No Known Problems Paternal Grandfather     Malig Hyperthermia Neg Hx        Social History     Socioeconomic History    Marital status:     Years of education: College   Tobacco Use    Smoking status: Never     Passive exposure: Never    Smokeless tobacco: Never    Tobacco comments:     occ caffeine use   Vaping Use    Vaping status: Never Used   Substance and Sexual Activity    Alcohol use: No    Drug use: No    Sexual activity: Defer           Objective   Physical Exam  Vitals reviewed.   Constitutional:       Appearance: Normal appearance. She is normal weight.   Eyes:      Conjunctiva/sclera: Conjunctivae normal.   Cardiovascular:      Rate and Rhythm: Normal rate and regular rhythm.      Pulses: Normal pulses.   Abdominal:      Tenderness: There is no right CVA tenderness or left CVA tenderness.   Musculoskeletal:         General: Signs of injury present. No swelling or tenderness. Normal range of motion.      Cervical back: Normal range of motion and neck supple. No tenderness.   Skin:     General: Skin is warm and dry.      Capillary Refill: Capillary refill takes less than 2 seconds.      Comments: Skin abrasion left knee just beneath  the patella, not tender, appears clean, old bruising was very small skin tear left shin about 1 mm in size   Neurological:      Mental Status: She is alert.   Psychiatric:         Mood and Affect: Mood normal.         Behavior: Behavior normal.         Procedures           ED Course                                           Medical Decision Making  Presentation patient's only complaint is that she has abrasions on the left knee.  She tells me they do not hurt.  She uses a walker but had been out in the garage sweeping when she fell forward.  Family was not with her but accompany her.  She has good range of motion and no tenderness.  She does have prosthetic knees.  No pain at tibial plateau.  Neurovascular intact.  Abrasion on the left knee is about 5 cm annular and very superficial.  Nurse cleaned and bandaged it.  Not tender over tibial plateau and low suspicion for tibial plateau fracture.  She is also walking with her walker.    She has some bruising on her shin that looks old and there is about a 1 mm laceration that has some dried blood on it.  Sutures not needed.    When I took off her shoes to check for pulses and range of motion she did not complain of pain but it had pain when the nurse put her shoe back on.  Her left third toe is slightly swollen and she says it hurt when they were putting the shoe back on and so x-ray was ordered which was NAD.  I reexamined her prior to discharge and it is now not tender and she has good range of motion.  She does see a orthopedist about her feet regularly as they are working on some chronic sores that are on her feet.  These do not appear infected and they will go ahead and follow up with their orthopedist.  She does have family doctor for follow-up.  Told her to make sure she is not sweeping and best to use her walker when she is walking.  She is medically cleared at this time with return precautions if she needs anything, medically cleared.    Problems  Addressed:  Abrasion: complicated acute illness or injury  Contusion of left knee, initial encounter: complicated acute illness or injury  Pain of toe of left foot: complicated acute illness or injury    Amount and/or Complexity of Data Reviewed  Radiology: ordered.        Final diagnoses:   Contusion of left knee, initial encounter   Pain of toe of left foot   Abrasion       ED Disposition  ED Disposition       ED Disposition   Discharge    Condition   Stable    Comment   --               Jeanne Patel, APRN  [address] [id number] [phone]            Follow-up with your orthopedist.  In the meantime, return to emergency room if you have any concerns or new symptoms.             Medication List      No changes were made to your prescriptions during this visit.

## 2024-10-10 DIAGNOSIS — E78.5 HYPERLIPIDEMIA, UNSPECIFIED HYPERLIPIDEMIA TYPE: ICD-10-CM

## 2024-10-10 DIAGNOSIS — D50.9 IRON DEFICIENCY ANEMIA, UNSPECIFIED IRON DEFICIENCY ANEMIA TYPE: ICD-10-CM

## 2024-10-10 DIAGNOSIS — D70.9 NEUTROPENIA, UNSPECIFIED TYPE: ICD-10-CM

## 2024-10-10 DIAGNOSIS — R73.01 ELEVATED FASTING GLUCOSE: ICD-10-CM

## 2024-10-10 DIAGNOSIS — D61.818 PANCYTOPENIA: ICD-10-CM

## 2024-10-10 DIAGNOSIS — I10 ESSENTIAL HYPERTENSION: Primary | ICD-10-CM

## 2024-10-12 LAB
ALBUMIN SERPL-MCNC: 3.4 G/DL (ref 3.7–4.7)
ALP SERPL-CCNC: 178 IU/L (ref 44–121)
ALT SERPL-CCNC: 29 IU/L (ref 0–32)
APPEARANCE UR: ABNORMAL
AST SERPL-CCNC: 55 IU/L (ref 0–40)
BACTERIA #/AREA URNS HPF: ABNORMAL /[HPF]
BASOPHILS # BLD AUTO: 0 X10E3/UL (ref 0–0.2)
BASOPHILS NFR BLD AUTO: 1 %
BILIRUB SERPL-MCNC: 1 MG/DL (ref 0–1.2)
BILIRUB UR QL STRIP: NEGATIVE
BUN SERPL-MCNC: 18 MG/DL (ref 8–27)
BUN/CREAT SERPL: 19 (ref 12–28)
CALCIUM SERPL-MCNC: 9.4 MG/DL (ref 8.7–10.3)
CASTS URNS QL MICRO: ABNORMAL /LPF
CHLORIDE SERPL-SCNC: 108 MMOL/L (ref 96–106)
CHOLEST SERPL-MCNC: 143 MG/DL (ref 100–199)
CO2 SERPL-SCNC: 23 MMOL/L (ref 20–29)
COLOR UR: YELLOW
CREAT SERPL-MCNC: 0.97 MG/DL (ref 0.57–1)
EGFRCR SERPLBLD CKD-EPI 2021: 57 ML/MIN/1.73
EOSINOPHIL # BLD AUTO: 0.1 X10E3/UL (ref 0–0.4)
EOSINOPHIL NFR BLD AUTO: 3 %
EPI CELLS #/AREA URNS HPF: ABNORMAL /HPF (ref 0–10)
ERYTHROCYTE [DISTWIDTH] IN BLOOD BY AUTOMATED COUNT: 15.2 % (ref 11.7–15.4)
GLOBULIN SER CALC-MCNC: 2.8 G/DL (ref 1.5–4.5)
GLUCOSE SERPL-MCNC: 125 MG/DL (ref 70–99)
GLUCOSE UR QL STRIP: NEGATIVE
HBA1C MFR BLD: 6.1 % (ref 4.8–5.6)
HCT VFR BLD AUTO: 38.9 % (ref 34–46.6)
HDLC SERPL-MCNC: 53 MG/DL
HGB BLD-MCNC: 12.8 G/DL (ref 11.1–15.9)
HGB UR QL STRIP: ABNORMAL
IMM GRANULOCYTES # BLD AUTO: 0 X10E3/UL (ref 0–0.1)
IMM GRANULOCYTES NFR BLD AUTO: 0 %
IRON SATN MFR SERPL: 25 % (ref 15–55)
IRON SERPL-MCNC: 70 UG/DL (ref 27–139)
KETONES UR QL STRIP: NEGATIVE
LDLC SERPL CALC-MCNC: 75 MG/DL (ref 0–99)
LDLC/HDLC SERPL: 1.4 RATIO (ref 0–3.2)
LEUKOCYTE ESTERASE UR QL STRIP: ABNORMAL
LYMPHOCYTES # BLD AUTO: 1 X10E3/UL (ref 0.7–3.1)
LYMPHOCYTES NFR BLD AUTO: 27 %
MCH RBC QN AUTO: 30.5 PG (ref 26.6–33)
MCHC RBC AUTO-ENTMCNC: 32.9 G/DL (ref 31.5–35.7)
MCV RBC AUTO: 93 FL (ref 79–97)
MICRO URNS: ABNORMAL
MONOCYTES # BLD AUTO: 0.3 X10E3/UL (ref 0.1–0.9)
MONOCYTES NFR BLD AUTO: 8 %
MORPHOLOGY BLD-IMP: ABNORMAL
NEUTROPHILS # BLD AUTO: 2.3 X10E3/UL (ref 1.4–7)
NEUTROPHILS NFR BLD AUTO: 61 %
NITRITE UR QL STRIP: NEGATIVE
PH UR STRIP: 5.5 [PH] (ref 5–7.5)
PLATELET # BLD AUTO: 70 X10E3/UL (ref 150–450)
POTASSIUM SERPL-SCNC: 4.3 MMOL/L (ref 3.5–5.2)
PROT SERPL-MCNC: 6.2 G/DL (ref 6–8.5)
PROT UR QL STRIP: ABNORMAL
RBC # BLD AUTO: 4.19 X10E6/UL (ref 3.77–5.28)
RBC #/AREA URNS HPF: ABNORMAL /HPF (ref 0–2)
SODIUM SERPL-SCNC: 143 MMOL/L (ref 134–144)
SP GR UR STRIP: 1.02 (ref 1–1.03)
TIBC SERPL-MCNC: 284 UG/DL (ref 250–450)
TRIGL SERPL-MCNC: 74 MG/DL (ref 0–149)
UIBC SERPL-MCNC: 214 UG/DL (ref 118–369)
UROBILINOGEN UR STRIP-MCNC: 0.2 MG/DL (ref 0.2–1)
VLDLC SERPL CALC-MCNC: 15 MG/DL (ref 5–40)
WBC # BLD AUTO: 3.7 X10E3/UL (ref 3.4–10.8)
WBC #/AREA URNS HPF: >30 /HPF (ref 0–5)

## 2024-10-14 ENCOUNTER — TELEPHONE (OUTPATIENT)
Dept: FAMILY MEDICINE CLINIC | Facility: CLINIC | Age: 89
End: 2024-10-14
Payer: MEDICARE

## 2024-10-17 NOTE — TELEPHONE ENCOUNTER
Caller: Jesus Durham    Relationship: Emergency Contact    Best call back number: [phone]    PLEASE GIVE PATIENT A CALLBACK REGARDING HER APPOINTMENT TOMORROW AT 10 AM.   ORIGINALLY SET FR 3 PM,. AND PATIENT CANNOT MAKE THE 10 AM SLOT.  PLEASE ADVISE

## 2024-10-24 ENCOUNTER — TELEMEDICINE (OUTPATIENT)
Dept: FAMILY MEDICINE CLINIC | Facility: CLINIC | Age: 89
End: 2024-10-24
Payer: MEDICARE

## 2024-10-24 DIAGNOSIS — N30.90 CYSTITIS: ICD-10-CM

## 2024-10-24 DIAGNOSIS — D50.9 IRON DEFICIENCY ANEMIA, UNSPECIFIED IRON DEFICIENCY ANEMIA TYPE: ICD-10-CM

## 2024-10-24 DIAGNOSIS — D69.6 THROMBOCYTOPENIA: ICD-10-CM

## 2024-10-24 DIAGNOSIS — K74.60 CIRRHOSIS, NON-ALCOHOLIC: ICD-10-CM

## 2024-10-24 DIAGNOSIS — J06.9 ACUTE URI: ICD-10-CM

## 2024-10-24 DIAGNOSIS — E78.2 MIXED HYPERLIPIDEMIA: ICD-10-CM

## 2024-10-24 DIAGNOSIS — I10 ESSENTIAL HYPERTENSION: Primary | ICD-10-CM

## 2024-10-24 DIAGNOSIS — R73.03 PRE-DIABETES: ICD-10-CM

## 2024-10-24 DIAGNOSIS — N18.31 STAGE 3A CHRONIC KIDNEY DISEASE: Chronic | ICD-10-CM

## 2024-10-24 PROCEDURE — 99214 OFFICE O/P EST MOD 30 MIN: CPT | Performed by: NURSE PRACTITIONER

## 2024-10-24 PROCEDURE — 1126F AMNT PAIN NOTED NONE PRSNT: CPT | Performed by: NURSE PRACTITIONER

## 2024-10-24 PROCEDURE — 1159F MED LIST DOCD IN RCRD: CPT | Performed by: NURSE PRACTITIONER

## 2024-10-24 PROCEDURE — 1160F RVW MEDS BY RX/DR IN RCRD: CPT | Performed by: NURSE PRACTITIONER

## 2024-10-24 RX ORDER — AMOXICILLIN AND CLAVULANATE POTASSIUM 500; 125 MG/1; MG/1
1 TABLET, FILM COATED ORAL 2 TIMES DAILY
Qty: 14 TABLET | Refills: 0 | Status: SHIPPED | OUTPATIENT
Start: 2024-10-24 | End: 2024-10-31

## 2024-10-24 NOTE — PATIENT INSTRUCTIONS
Hypertension: continue carvedilol 3.125 mg 1 tab twice a day, lisinopril 5 mg 1 tab daily    Hyperlipidemia: stable, continue atorvastatin 40 mg 1 tab daily    Pre diabetes: discussed in detail, HgA1c 6.1; discussed diet in detail, recommend eating a heart healthy low sugar/low carb diet, recommend increasing activity to 3 times a week    CKD: stable, continue to monitor    Cirrhosis, non-alcholic: liver enzymes slightly abnormal, stable for Patient, continue to monitor    Thrombocytopenia: low, normal for Patient, discussed risks, monitor    Iron def. Anemia: need to increase protien/iron in diet    Cystitis: starting low dose Augmentin bid x 7 days    URI: Drink plenty of fluids-water preferably, eat a heart healthy diet, and get plenty of sleep

## 2024-11-16 PROBLEM — I21.9 MYOCARDIAL INFARCTION LESS THAN 4 WEEKS AGO: Status: ACTIVE | Noted: 2024-01-01

## 2024-11-17 NOTE — NURSING NOTE
Time of death 737    No breath sounds auscultated  No palpable pulses  No audible heart tones  Asystole in two leads  Verified by 2 RN's    Ivelisse Cooper RN

## 2024-11-17 NOTE — PLAN OF CARE
Goal Outcome Evaluation:  Plan of Care Reviewed With: patient, child        Progress: improving  Outcome Evaluation: Pt alert to self and location, FOWLER, afebrile, NS at 125 ml/hr bp much improve has not made urine will bladder scan. right radial site CDI

## 2024-11-17 NOTE — DISCHARGE SUMMARY
[facility] Discharge Summary      Patient Name: Pamela Durham  Age/Sex: 89 y.o. female  : 1935  MRN: [id number]    Encounter Provider: Drake Padgett MD  Referring Provider: No ref. provider found  Place of Service: [facility] CARDIOLOGY  Patient Care Team:  Jeanne Patel APRN as PCP - General (Family Medicine)  Leyla Gutiérrez MD as Consulting Physician (Hematology and Oncology)  Desean Earl MD as Referring Physician (Cardiology)  Javier Peñaloza MD as Consulting Physician (Gastroenterology)         Date of Discharge:  2024     Date of Admit: 2024      Discharge Diagnosis:   Myocardial infarction less than 4 weeks ago        Hospital Course:   Ms. Durham is a 89 y.o. woman with past medical history notable for artery disease with chronic total occlusion and significant distal left main and ostial LAD stenoses which has been medically managed, hypertension, mixed hyperlipidemia, and recent falls who was found down this evening EKG findings concerning for anterior myocardial infarction STEMI.  We did take her to the lab where we found again multivessel complex calcified disease however arteries were open there was no occluded vessel consistent with an acute STEMI but definitely had multivessel disease  and could have had a artery which had occluded and recannulized.  Nonetheless she had BAKARI-3 flow in all her vessels.  We had decided to take a medical approach given the complex nature of her disease which was heavily calcified and would likely require atherectomy and furthermore and more importantly her decompensated state upon arrival.  Labs were pending when we took her to the lab however I had a high suspicion that she would be in acute renal failure due to rhabdomyolysis given being that she was on the floor for an extended period of time this was the case.  Post cath patient was doing reasonable we are aggressively  hydrating her.  She was in acute renal failure.  Last night after the cath I did talk with the son given that the patient although was awake was not in great decisional making capacity.  He was a power of  and we discussed her wishes she does have a living will on file stating that she would not want to be on prolonged life supporting measures if she was not going to make a good recovery.  We did discuss with her current situation recent MI renal failure liver failure that she would be at high risk for her heart stopping.  He did state that he would not want her to have CPR which I supported and agree with.  We were talking about potentially needing dialysis but she was also thinking that she would not want to do more support.  About 12 hours after arrival patient had actually just gotten an echocardiogram when they had left the room patient's heart rate slowed down and eventually stopped.  Patient had a PEA arrest evolving into asystole.  She was not in any pain or uncomfortable patient's son was actually in the room when this happened.  She was pronounced dead at 7:37        Procedures Performed:  Procedure(s):  Left Heart Cath  Coronary angiography         Consults:  Consults       No orders found for last 30 day(s).            Pertinent Test Results:    Results from last 7 days   Lab Units 11/17/24  0647 11/17/24 0226 11/16/24  2222   SODIUM mmol/L 146* 143 141   POTASSIUM mmol/L 6.0* 5.6* 5.8*   CHLORIDE mmol/L 108* 106 107   CO2 mmol/L 12.0* 15.2* 13.0*   BUN mg/dL 45* 42* 39*   CREATININE mg/dL 3.66* 3.44* 3.20*   GLUCOSE mg/dL 63* 121* 132*   CALCIUM mg/dL 8.3* 7.9* 8.6       Results from last 7 days   Lab Units 11/17/24  0647 11/16/24  2222   HSTROP T ng/L 8,840* 6,795*     Results from last 7 days   Lab Units 11/17/24  0647 11/17/24 0226 11/16/24 2125   WBC 10*3/mm3 19.98* 14.48* 15.78*   HEMOGLOBIN g/dL 14.8 13.8 14.7   HEMATOCRIT % 46.7* 41.7 46.0   PLATELETS 10*3/mm3 124* 92* 127*     Results  from last 7 days   Lab Units 24   INR  1.68*   APTT seconds 35.6*     Results from last 7 days   Lab Units 24   MAGNESIUM mg/dL 2.5*         Results from last 7 days   Lab Units 24   PROBNP pg/mL 7,462.0*               Discharge Medications     Your medication list        ASK your doctor about these medications        Instructions Last Dose Given Next Dose Due   atorvastatin 40 MG tablet  Commonly known as: LIPITOR      Take 1 tablet by mouth Every Night.       BIOTIN PO      Take  by mouth Daily.       CALCIUM 1200 PO      Take  by mouth 2 (Two) Times a Day.       carvedilol 3.125 MG tablet  Commonly known as: COREG      Take 1 tablet by mouth 2 (Two) Times a Day With Meals.       lisinopril 5 MG tablet  Commonly known as: PRINIVIL,ZESTRIL      TAKE 1 TABLET BY MOUTH DAILY       multivitamin tablet tablet  Commonly known as: THERAGRAN      Take  by mouth Daily. Centrum silver       vitamin D3 125 MCG (5000 UT) capsule capsule      Take 1 capsule by mouth Daily.       VITAMIN E PO      Take  by mouth Daily.                  Discharge Diet:   Dietary Orders (From admission, onward)       Start     Ordered    24  Diet: Renal; Low Sodium (2-3g), Low Potassium, Low Phosphorus; Fluid Consistency: Thin (IDDSI 0)  Diet Effective Now        References:    Diet Order [facility]   Question Answer Comment   Diets: Renal    Renal Diet: Low Sodium (2-3g)    Renal Diet: Low Potassium    Renal Diet: Low Phosphorus    Fluid Consistency: Thin (IDDSI 0)        248                        Discharge disposition:  home    Discharge condition:       Follow-up Appointments  Future Appointments   Date Time Provider Department Center   2025  3:20 PM Desean Earl MD [id number] None      Follow-up Information       [facility] CARD REHAB .    Specialty: Cardiac Rehabilitation  Contact information:  [address] [facility]  [address]  [phone]                             Test Results Pending at Discharge  Additional Instructions for the Follow-ups that You Need to Schedule       Ambulatory Referral to Cardiac Rehab   As directed                 Time:   I spent  35  minutes on this discharge activity which included: face-to-face encounter with the patient, reviewing the data in the system, coordination of the care with the nursing staff as well as consultants, documentation, and entering orders.          Drake Padgett MD  [facility]  11/17/24  08:24 EST

## 2024-11-17 NOTE — ED PROVIDER NOTES
EMERGENCY DEPARTMENT ENCOUNTER  Room Number:  Pool/CATH  PCP: Jeanne Patel APRN  Independent Historians: Patient and EMS  Date of encounter:  11/16/2024  Patient Care Team:  Jeanne Patel APRN as PCP - General (Family Medicine)  Leyla Gutiérrez MD as Consulting Physician (Hematology and Oncology)  Desean Earl MD as Referring Physician (Cardiology)  Javier Peñaloza MD as Consulting Physician (Gastroenterology)     HPI:  Chief Complaint: had concerns including Altered Mental Status.     A complete HPI/ROS/PMH/PSH/SH/FH are unobtainable due to: Altered Mental Status and Poor historian    Chronic or social conditions impacting patient care (Social Determinants of Health): None      Context: Pamela Durham is a 89 y.o. female with a medical history of hypertension, hyperlipidemia, NSTEMI, GERD, CKD who presents to the ED c/o acute being found down.  Per verbal report from EMS, patient was found down in her home.  They cannot give circumstances for why she fell or how long she was down.  Patient had ST elevations on prehospital EKG Cath Lab was activated.  Patient does not take any blood thinners.  Patient herself denies headache, nausea, vomiting, chest pain or difficulty breathing.  She has no complaints.  She cannot get the circumstances of her fall.    Review of prior external notes (non-ED) -and- Review of prior external test results outside of this encounter:  Cardiac cath from 3/1/2017 reviewed.  Patient had 40% distal stenosis in the left main, 70% proximal stenosis in the LAD, 40% ostial stenosis in the RCA, normal EF    PAST MEDICAL HISTORY  Active Ambulatory Problems     Diagnosis Date Noted    Status post total replacement of left hip 04/08/2016    Lumbar canal stenosis 04/08/2016    Lumbar radiculopathy 07/28/2016    Essential hypertension 07/28/2016    Hyperlipemia 07/28/2016    Elevated fasting glucose 07/28/2016    NSTEMI (non-ST elevated myocardial infarction) 12/24/2016    Iron  deficiency anemia 12/25/2016    General weakness 12/25/2016    Lung nodule 12/26/2016    Superficial thrombophlebitis of left upper extremity 12/29/2016    Pancytopenia 06/19/2017    Leukopenia 06/19/2017    Coronary artery disease involving native coronary artery of native heart without angina pectoris 07/24/2017    Abnormal CT of the abdomen 09/08/2017    Cirrhosis, non-alcoholic 12/27/2018    Arthritis of left hip 01/09/2019    CKD (chronic kidney disease) stage 3, GFR 30-59 ml/min 01/24/2019    Cognitive communication deficit 01/28/2019    Gastro-esophageal reflux disease without esophagitis 01/28/2019    Ingrown toenail 04/15/2024    Numerous moles 04/15/2024    Arthralgia of left shoulder region 04/15/2024    Thrombocytopenia 10/24/2024    Pre-diabetes 10/24/2024     Resolved Ambulatory Problems     Diagnosis Date Noted    Abnormal CT of the abdomen 12/26/2016    Abnormal CT of the abdomen 09/08/2017    General weakness 09/08/2017    Abnormal CT of the abdomen 09/08/2017    General weakness 09/08/2017     Past Medical History:   Diagnosis Date    Anemia     Gallbladder stone without cholecystitis or obstruction     GERD (gastroesophageal reflux disease)     Health care maintenance     Hyperlipidemia     Hypertension     MI (myocardial infarction) 12/25/2016    Osteoarthritis     Thrombophlebitis of superficial veins of upper extremities        PAST SURGICAL HISTORY  Past Surgical History:   Procedure Laterality Date    CARDIAC CATHETERIZATION N/A 3/1/2017    Procedure: Coronary angiography;  Surgeon: Desean Earl MD;  Location: [facility] CATH INVASIVE LOCATION;  Service:     COLONOSCOPY N/A 12/27/2016    Procedure: COLONOSCOPY INTO CECUM WITH ARGON PLASMA COAGULATION;  Surgeon: Javier Peñaloza MD;  Location: [facility] ENDOSCOPY;  Service:     ENDOSCOPY N/A 12/27/2016    Procedure: ESOPHAGOGASTRODUODENOSCOPY WITH COLD BIOPSIES;  Surgeon: Javier Peñaloza MD;  Location: [facility] ENDOSCOPY;  Service:     HIP  ARTHROPLASTY Right 04/01/2015    HIP CLOSED REDUCTION Left 1/25/2019    Procedure: LT. HIP CLOSED REDUCTION;  Surgeon: Domenico Peñaloza MD;  Location: [facility] OR;  Service: Orthopedics    HYSTERECTOMY  1986    JOINT REPLACEMENT      KNEE SURGERY Bilateral     2007 & 2008    LUNG SURGERY  1965    TONSILLECTOMY      TOTAL HIP ARTHROPLASTY Left 1/23/2019    Procedure: LEFT TOTAL HIP ARTHROPLASTY MARICRUZ NAVIGATION;  Surgeon: Domenico Peñaloza MD;  Location: [facility] OR;  Service: Orthopedics       FAMILY HISTORY  Family History   Problem Relation Age of Onset    Hypertension Other     Heart disease Other     Liver cancer Brother     Ovarian cancer Daughter         diagnosed 2012    No Known Problems Mother     Heart disease Father     Heart attack Father     Hypertension Father     Cancer Maternal Grandmother     No Known Problems Maternal Grandfather     No Known Problems Paternal Grandmother     No Known Problems Paternal Grandfather     Malig Hyperthermia Neg Hx        SOCIAL HISTORY  Social History     Socioeconomic History    Marital status:     Years of education: College   Tobacco Use    Smoking status: Never     Passive exposure: Never    Smokeless tobacco: Never    Tobacco comments:     occ caffeine use   Vaping Use    Vaping status: Never Used   Substance and Sexual Activity    Alcohol use: No    Drug use: No    Sexual activity: Defer       ALLERGIES  Patient has no known allergies.    REVIEW OF SYSTEMS  Review of Systems  Included in HPI  All systems reviewed and negative except for those discussed in HPI.    PHYSICAL EXAM    I have reviewed the triage vital signs and nursing notes.    ED Triage Vitals [11/16/24 2120]   Temp Heart Rate Resp BP SpO2   -- 72 16 92/56 93 %      Temp src Heart Rate Source Patient Position BP Location FiO2 (%)   -- Monitor Lying Right arm --       Physical Exam  Constitutional:       General: She is not in acute distress.     Appearance: Normal appearance. She is  ill-appearing. She is not toxic-appearing.   HENT:      Head: Normocephalic.      Comments: Scattered abrasions to the left face and cheek     Nose: Nose normal.      Mouth/Throat:      Mouth: Mucous membranes are moist.      Pharynx: Oropharynx is clear.   Eyes:      Extraocular Movements: Extraocular movements intact.      Conjunctiva/sclera: Conjunctivae normal.      Pupils: Pupils are equal, round, and reactive to light.   Cardiovascular:      Rate and Rhythm: Normal rate and regular rhythm.      Pulses: Normal pulses.      Heart sounds: Normal heart sounds. No murmur heard.     No friction rub. No gallop.   Pulmonary:      Effort: Pulmonary effort is normal. No respiratory distress.      Breath sounds: Normal breath sounds. No stridor. No wheezing, rhonchi or rales.   Abdominal:      General: Abdomen is flat. There is no distension.      Palpations: Abdomen is soft.      Tenderness: There is no abdominal tenderness. There is no guarding or rebound.   Musculoskeletal:      Cervical back: Normal range of motion and neck supple.   Skin:     General: Skin is warm and dry.   Neurological:      General: No focal deficit present.      Mental Status: She is alert and oriented to person, place, and time. Mental status is at baseline.   Psychiatric:         Mood and Affect: Mood normal.         Behavior: Behavior normal.         Thought Content: Thought content normal.         Judgment: Judgment normal.         LAB RESULTS  Recent Results (from the past 24 hours)   ECG 12 Lead ED Triage Standing Order; Chest Pain    Collection Time: 11/16/24  9:20 PM   Result Value Ref Range    QT Interval 389 ms    QTC Interval 438 ms   Green Top (Gel)    Collection Time: 11/16/24  9:25 PM   Result Value Ref Range    Extra Tube Hold for add-ons.    Lavender Top    Collection Time: 11/16/24  9:25 PM   Result Value Ref Range    Extra Tube hold for add-on    Gold Top - SST    Collection Time: 11/16/24  9:25 PM   Result Value Ref Range     Extra Tube Hold for add-ons.    Light Blue Top    Collection Time: 11/16/24  9:25 PM   Result Value Ref Range    Extra Tube Hold for add-ons.    CBC Auto Differential    Collection Time: 11/16/24  9:25 PM    Specimen: Arm, Right; Blood   Result Value Ref Range    WBC 15.78 (H) 3.40 - 10.80 10*3/mm3    RBC 4.97 3.77 - 5.28 10*6/mm3    Hemoglobin 14.7 12.0 - 15.9 g/dL    Hematocrit 46.0 34.0 - 46.6 %    MCV 92.6 79.0 - 97.0 fL    MCH 29.6 26.6 - 33.0 pg    MCHC 32.0 31.5 - 35.7 g/dL    RDW 15.6 (H) 12.3 - 15.4 %    RDW-SD 53.8 37.0 - 54.0 fl    MPV 11.9 6.0 - 12.0 fL    Platelets 127 (L) 140 - 450 10*3/mm3    Neutrophil % 84.7 (H) 42.7 - 76.0 %    Lymphocyte % 5.8 (L) 19.6 - 45.3 %    Monocyte % 9.0 5.0 - 12.0 %    Eosinophil % 0.0 (L) 0.3 - 6.2 %    Basophil % 0.1 0.0 - 1.5 %    Immature Grans % 0.4 0.0 - 0.5 %    Neutrophils, Absolute 13.36 (H) 1.70 - 7.00 10*3/mm3    Lymphocytes, Absolute 0.91 0.70 - 3.10 10*3/mm3    Monocytes, Absolute 1.42 (H) 0.10 - 0.90 10*3/mm3    Eosinophils, Absolute 0.00 0.00 - 0.40 10*3/mm3    Basophils, Absolute 0.02 0.00 - 0.20 10*3/mm3    Immature Grans, Absolute 0.07 (H) 0.00 - 0.05 10*3/mm3    nRBC 0.0 0.0 - 0.2 /100 WBC   aPTT    Collection Time: 11/16/24  9:25 PM    Specimen: Arm, Right; Blood   Result Value Ref Range    PTT 35.6 (H) 22.7 - 35.4 seconds   Protime-INR    Collection Time: 11/16/24  9:25 PM    Specimen: Arm, Right; Blood   Result Value Ref Range    Protime 20.1 (H) 11.7 - 14.2 Seconds    INR 1.68 (H) 0.90 - 1.10   Phosphorus    Collection Time: 11/16/24  9:25 PM    Specimen: Arm, Right; Blood   Result Value Ref Range    Phosphorus 7.2 (H) 2.5 - 4.5 mg/dL   Magnesium    Collection Time: 11/16/24  9:25 PM    Specimen: Arm, Right; Blood   Result Value Ref Range    Magnesium 2.5 (H) 1.6 - 2.4 mg/dL   BNP    Collection Time: 11/16/24  9:25 PM    Specimen: Arm, Right; Blood   Result Value Ref Range    proBNP 7,462.0 (H) 0.0 - 1,800.0 pg/mL       RADIOLOGY  CT Facial Bones  Without Contrast    Result Date: 11/16/2024  Patient: MANN FOX  Time Out: 22:00 Exam(s): CT FACIAL Without Contrast EXAM:   CT Maxillofacial Without Intravenous Contrast CLINICAL HISTORY:    Reason for exam: fall. TECHNIQUE:   Axial computed tomography images of the face without intravenous contrast.  CTDI is 18.33 mGy and DLP is 347.6 mGy-cm.  This CT exam was performed according to the principle of ALARA (As Low As Reasonably Achievable) by using one or more of the following dose reduction techniques: automated exposure control, adjustment of the mA and or kV according to patient size, and or use of iterative reconstruction technique.  Moderate artifact from motion and dental metal. COMPARISON:   No relevant prior studies available. FINDINGS:   Bones joints:  No nasal bone, orbital wall or facial fracture.   Soft tissues: Mild left frontal scalp hematoma.   Orbits:  Unremarkable.   Sinuses:  Unremarkable.  No air-fluid levels. IMPRESSION:     1.  No fracture or acute bony abnormality.    Electronically signed by Izabela Gupta M.D. on 11-16-24 at 2200    CT Cervical Spine Without Contrast    Result Date: 11/16/2024  Patient: MANN FOX  Time Out: 21:59 Exam(s): CT C SPINE EXAM:   CT Cervical Spine Without Intravenous Contrast CLINICAL HISTORY:    Reason for exam: fall. TECHNIQUE:   Axial computed tomography images of the cervical spine without intravenous contrast.  CTDI is 14.91 mGy and DLP is 258.7 mGy-cm.  This CT exam was performed according to the principle of ALARA (As Low As Reasonably Achievable) by using one or more of the following dose reduction techniques: automated exposure control, adjustment of the mA and or kV according to patient size, and or use of iterative reconstruction technique. COMPARISON:   None. FINDINGS:   Vertebrae:   No acute fracture.   Discs spinal canal neural foramina:  Severe degenerative disc disease and moderate facet hypertrophy.   Soft tissues:  Unremarkable.  IMPRESSION:     1.  Severe degenerative disc disease. 2.  No fracture or acute bony abnormality.    Electronically signed by Izabela Gutpa M.D. on 11-16-24 at 2159    XR Chest 1 View    Result Date: 11/16/2024  AP CHEST  HISTORY: Chest pain  COMPARISON: 12/29/2016  FINDINGS: The lung fields appear clear. Heart size is within normal limits. No appreciable pneumothorax. No acute appearing bony abnormality      No acute findings    This report was finalized on 11/16/2024 9:58 PM by Dr. Saad Watson M.D on Workstation: [id number]      CT Head Without Contrast    Result Date: 11/16/2024  Patient: MANN FOX  Time Out: 21:54 Exam(s): CT HEAD Without Contrast EXAM:   CT Head Without Intravenous Contrast CLINICAL HISTORY:    Reason for exam: fall. TECHNIQUE:   Axial computed tomography images of the head brain without intravenous contrast.  CTDI is 54.92 mGy and DLP is 939 mGy-cm.  This CT exam was performed according to the principle of ALARA (As Low As Reasonably Achievable) by using one or more of the following dose reduction techniques: automated exposure control, adjustment of the mA and or kV according to patient size, and or use of iterative reconstruction technique. COMPARISON:   None. FINDINGS:   Brain: No mass effect or acute infarct. No acute hemorrhage.  Mild atrophy and moderate, chronic white matter disease.   Ventricles:   No hydrocephalus or midline shift.   Bones joints:   No skull fracture.   Soft tissues: Minimal left frontal scalp hematoma.   Visualized Sinuses: Clear.   Mastoid air cells:   No mastoid effusion. IMPRESSION:     1.  Mild to moderate age-related findings. 2.  No skull fracture, bleed, or acute intracranial abnormality.    Electronically signed by Izabela Gupta M.D. on 11-16-24 at 2154     MEDICATIONS GIVEN IN ER  Medications   sodium chloride 0.9 % flush 10 mL ( Intravenous MAR Hold 11/16/24 2215)   sodium chloride 0.9 % bolus 1,000 mL (1,000 mL Intravenous New Bag  11/16/24 2153)   fentaNYL citrate (PF) (SUBLIMAZE) injection (25 mcg Intravenous Given 11/16/24 2216)   lidocaine (XYLOCAINE) 2% injection (5 mL Infiltration Given 11/16/24 2219)   nitroGLYCERIN 200 mcg/mL 30 mL syringe (200 mcg Intra-arterial Given 11/16/24 2220)   verapamil (ISOPTIN) injection (500 mcg Intra-arterial Given 11/16/24 2220)   heparin (porcine) injection (3,000 Units Intra-arterial Given 11/16/24 2220)   aspirin tablet 325 mg (325 mg Oral Given 11/16/24 2153)   Tetanus-Diphth-Acell Pertussis (BOOSTRIX) injection 0.5 mL (0.5 mL Intramuscular Given 11/16/24 2159)       ORDERS PLACED DURING THIS VISIT:  Orders Placed This Encounter   Procedures    XR Chest 1 View    CT Head Without Contrast    CT Cervical Spine Without Contrast    CT Facial Bones Without Contrast    [facility] Draw    Comprehensive Metabolic Panel    High Sensitivity Troponin T    CBC Auto Differential    aPTT    Protime-INR    Urinalysis With Culture If Indicated - Urine, Catheter    Phosphorus    Magnesium    BNP    NPO Diet NPO Type: Strict NPO    Undress & Gown    Continuous Pulse Oximetry    Oxygen Therapy- Nasal Cannula; Titrate 1-6 LPM Per SpO2; 90 - 95%    ECG 12 Lead ED Triage Standing Order; Chest Pain    ECG 12 Lead ED Triage Standing Order; Chest Pain    Insert Peripheral IV    CBC & Differential    Green Top (Gel)    Lavender Top    Gold Top - SST    Light Blue Top       OUTPATIENT MEDICATION MANAGEMENT:  Current Facility-Administered Medications Ordered in Epic   Medication Dose Route Frequency Provider Last Rate Last Admin    fentaNYL citrate (PF) (SUBLIMAZE) injection    Code / Trauma / Sedation Medication Drake Padgett MD   25 mcg at 11/16/24 2216    heparin (porcine) injection    Code / Trauma / Sedation Medication Drake Padgett MD   3,000 Units at 11/16/24 2220    lidocaine (XYLOCAINE) 2% injection    Code / Trauma / Sedation Medication Drake Padgett MD   5 mL at 11/16/24 2219    nitroGLYCERIN 200 mcg/mL 30  mL syringe    Code / Trauma / Sedation Medication Drake Padgett MD   200 mcg at 11/16/24 2220    sodium chloride 0.9 % bolus 1,000 mL  1,000 mL Intravenous Once Sawyer Ritter MD 1,000 mL/hr at 11/16/24 2153 1,000 mL at 11/16/24 2153    [Transfer Hold] sodium chloride 0.9 % flush 10 mL  10 mL Intravenous PRN Sawyer Ritter MD        verapamil (ISOPTIN) injection    Code / Trauma / Sedation Medication Drake Padgett MD   500 mcg at 11/16/24 2220     No current Epic-ordered outpatient medications on file.       PROCEDURES  Procedures    PROGRESS, DATA ANALYSIS, CONSULTS, AND MEDICAL DECISION MAKING  All labs have been independently interpreted by me.  All radiology studies have been reviewed by me. All EKG's have been independently viewed and interpreted by me.  Discussion below represents my analysis of pertinent findings related to patient's condition, differential diagnosis, treatment plan and final disposition.    Differential diagnosis includes but is not limited to STEMI, intracranial hemorrhage, electrolyte derangement, dehydration.    Clinical Scores:                   ED Course as of 11/16/24 2224   Sat Nov 16, 2024 2124 I spoke with Dr. Padgett with interventional cardiology.  I have sent the EKG to him for review.  Patient willing to CT scanner for CT head and C-spine. [AB]   2129 I spoke with Dr. Padgett again after reviewing the EKG.  He will evaluate the patient after CT imaging complete. [AB]   2140 EKG interpreted by myself  Time: 2120  Rate: 76  Rhythm: NSR  ST elevations in V2 through V4 with no significant in V3, no reciprocal depressions, normal QT interval [AB]   2147 CT Head Without Contrast  My independent interpretation of the imaging study is no intracranial hemorrhage [AB]   2147 XR Chest 1 View  My independent interpretation of the imaging study is no lobar infiltrate or pneumothorax [AB]   2159 Patient was evaluated by Dr. Padgett at bedside who will take the patient to  the cath lab. He does not request Pulmonary consultation at this time.  [AB]      ED Course User Index  [AB] Sawyer Ritter MD       CRITICAL CARE PERFORMED BY ED PHYSICIAN    Critical care provider statement:    Critical care time (minutes): 30.   Critical care time was exclusive of:  Separately billable procedures and treating other patients   Critical care was necessary to treat or prevent imminent or life-threatening deterioration of the following conditions:  Cardiac Failure   Critical care was time spent personally by me on the following activities:  Development of treatment plan with patient or surrogate, discussions with consultants, evaluation of patient's response to treatment, examination of patient, obtaining history from patient or surrogate, ordering and performing treatments and interventions, ordering and review of laboratory studies, ordering and review of radiographic studies, pulse oximetry, re-evaluation of patient's condition and review of old charts. Critical Care indicators:      See progress notes for further documentation.        AS OF 22:24 EST VITALS:    BP - 92/56  HR - 72  TEMP -    O2 SATS - 93%    COMPLEXITY OF CARE  The patient requires admission.      DIAGNOSIS  Final diagnoses:   ST elevation myocardial infarction (STEMI), unspecified artery   Abrasion of face, initial encounter         DISPOSITION  ED Disposition       ED Disposition   Decision to Admit    Condition   --    Comment   --                Please note that portions of this document were completed with a voice recognition program.    Note Disclaimer: At [facility], we believe that sharing information builds trust and better relationships. You are receiving this note because you recently visited [facility]. It is possible you will see health information before a provider has talked with you about it. This kind of information can be easy to misunderstand. To help you fully understand what it means for your  health, we urge you to discuss this note with your provider.         Sawyer Ritter MD  11/16/24 2193
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
## 2024-11-18 NOTE — CASE MANAGEMENT/SOCIAL WORK
Case Management Discharge Note      Final Note: Pt  2024 at 0727. ERIBERTO Rodriguez/CCP         Selected Continued Care - Discharged on 2024 Admission date: 2024 - Discharge disposition:  in Medical Facility     Final Discharge Disposition Code: 20 -

## 2025-01-13 NOTE — ANESTHESIA PROCEDURE NOTES
Health Maintenance       Pneumococcal Vaccine 0-64 (1 of 2 - PCV)  Never done    DTaP/Tdap/Td Vaccine (1 - Tdap)  Never done    Hepatitis B Vaccine (1 of 3 - 19+ 3-dose series)  Never done    Cervical Cancer Screening (View Topic Details)  Never done    Breast Cancer Screening (Every 2 Years)  Never done    Colorectal Cancer Screen (View Topic Details)  Never done    Lung Cancer Screening (Yearly)  Never done    Shingles Vaccine (1 of 2)  Never done    Traditional Medicare- Medicare Wellness Visit (Yearly)  Overdue since 7/1/2024    Influenza Vaccine (1)  Never done    COVID-19 Vaccine (1 - 2024-25 season)  Never done           Following review of the above:  Patient is not proceeding with: Cervical Cancer Screening, Colorectal Cancer Screening, Lung Cancer Screening, Mammogram, COVID-19, Dtap/Tdap/Td, Hep B, Influenza, Pneumococcal, and Shingles    Note: Refer to final orders and clinician documentation.          1.) Do you have an Advance directive, living will, or power of  for health care document that contains your wishes for end of life care?: No     2.) Would you like additional information on advance directives?: Yes     4.) During the past 4 weeks, what was the hardest physical activity you could do for at least 2 minutes?: Very Light     5.) Do you do moderate to strenuous exercise (brisk walk) for about 20 minutes for 3 or more days per week?: No, I usually do not exercise this much     6 a.) How many servings of Fruits and Vegetables do you have each day ( 1 serving = 1 piece of fruit, 1/2 cup fruits or vegetables): 1 per day     6 b.) How many servings of High Fiber / Whole Grain Foods to you have each day ( 1 serving = 1 cup cold cereal, 1/2 cup cooked cereal, 1 slice bread): 1 per day     6 d.) How many servings of Sugar Sweetened Beverages do you have each day ( 1 serving = 1 can or 12 oz cup of sode or juice): 2 per day     11a.) Bladder Control problems (urine leaking): Often     11b.)  Airway    Indications and Patient Condition  Mask difficulty assessment: 2 - vent by mask + OA or adjuvant +/- NMBA               Bowel control problems: Sometimes     12.) Do you need help with any of the following activities?   (check all that apply): Bathing, Dressing and grooming, Feeding Yourself, Using the Toilet, Getting in and out of bed or chairs     13.) Do you need help with any of the following activities?: Get to places outside of walking distance (can't drive alone, or take a bus/taxi alone), Go shopping for groceries or clothes, Do your housework or laundry, Prepare a meal     15.) How confident are you that you can control and manage most of your health problems?: Somewhat confident           Review Flowsheet  More data exists         1/13/2025   PHQ 2/9 Score   Adult PHQ 2 Score 4   Adult PHQ 2 Interpretation Further screening needed   Little interest or pleasure in activity? Nearly every day   Feeling down, depressed or hopeless? Several days   Adult PHQ 9 Score 8   Adult PHQ 9 Interpretation Mild Depression   Trouble falling or staying asleep or sleeping all the time? More than half the days   Feeling tired or having little energy? Several days   Poor appetite or overeating? Not at all   Feeling bad about yourself or that you are a failure or have let yourself or family down? Not at all   Trouble concentrating on things such as reading the newspaper or watching TV? Not at all   Moving or speaking slowly that other people have noticed or the opposite - being so fidgety or restless that you have been moving around a lot more than usual? Not at all   Thoughts that you would be better off dead or of hurting yourself in some way? Several days      Details                   Advance care planning documents on file - yes

## (undated) DEVICE — DGW .035 FC J3MM 260CM TEF: Brand: EMERALD

## (undated) DEVICE — CATH DIAG IMPULSE FL3.5 5F 100CM

## (undated) DEVICE — NDL SPINE 18G 31/2IN PNK

## (undated) DEVICE — KT MANIFLD CARDIAC

## (undated) DEVICE — PK HIP TOTL 40

## (undated) DEVICE — SUT VICRYL 1 CT1 27IN  JJ40G

## (undated) DEVICE — VITAL SIGNS™ ADULT ANESTHESIA BREATHING CIRCUIT: Brand: VITAL SIGNS™

## (undated) DEVICE — DRSNG TELFA PAD NONADH STR 1S 3X8IN

## (undated) DEVICE — APPL CHLORAPREP W/TINT 26ML ORNG

## (undated) DEVICE — SPNG GZ WOVN 4X4IN 12PLY 10/BX STRL

## (undated) DEVICE — GLV SURG TRIUMPH CLASSIC PF LTX 9 STRL

## (undated) DEVICE — IMMOB KN 3PNL DLX CANVS 22IN BLU

## (undated) DEVICE — DRSNG SURESITE WNDW 4X4.5

## (undated) DEVICE — GLV SURG SENSICARE POLYISPRN W/ALOE PF LF 9 GRN STRL

## (undated) DEVICE — SUT VIC 3/0 PS2 27IN J427H

## (undated) DEVICE — ENCORE® LATEX ORTHO SIZE 8.5, STERILE LATEX POWDER-FREE SURGICAL GLOVE: Brand: ENCORE

## (undated) DEVICE — DRAPE,REIN 53X77,STERILE: Brand: MEDLINE

## (undated) DEVICE — RADIFOCUS OPTITORQUE ANGIOGRAPHIC CATHETER: Brand: OPTITORQUE

## (undated) DEVICE — SYR LUERLOK 30CC

## (undated) DEVICE — CATH DIAG IMPULSE FR5 5F 100CM

## (undated) DEVICE — PK CATH CARD 40

## (undated) DEVICE — DEV INDEFLATOR P/N 580289

## (undated) DEVICE — SOL BETADINE 4OZ

## (undated) DEVICE — TR BAND RADIAL ARTERY COMPRESSION DEVICE: Brand: TR BAND

## (undated) DEVICE — INSTRUMENT BATTERY

## (undated) DEVICE — Device: Brand: PROWATER

## (undated) DEVICE — CATH DIAG IMPULSE PIG 5F 100CM

## (undated) DEVICE — SPNG LAP 18X18IN LF STRL PK/5

## (undated) DEVICE — ORTHOLOCK(R) EX-PIN 4 MM X 150 MM

## (undated) DEVICE — CATH F5 INF PIG145 110CM 6SH: Brand: INFINITI

## (undated) DEVICE — HEARTRAIL III GUIDING CATHETER: Brand: HEARTRAIL

## (undated) DEVICE — COVER,MAYO STAND,STERILE: Brand: MEDLINE

## (undated) DEVICE — PILLW ABD SM

## (undated) DEVICE — GLIDESHEATH BASIC HYDROPHILIC COATED INTRODUCER SHEATH: Brand: GLIDESHEATH

## (undated) DEVICE — GLIDESHEATH SLENDER STAINLESS STEEL KIT: Brand: GLIDESHEATH SLENDER

## (undated) DEVICE — GW EMR FIX EXCHG J STD .035 3MM 260CM

## (undated) DEVICE — HANDPIECE SET WITH COAXIAL HIGH FLOW TIP AND SUCTION TUBE: Brand: INTERPULSE

## (undated) DEVICE — PREMIUM WET SKIN PREP TRAY: Brand: MEDLINE INDUSTRIES, INC.

## (undated) DEVICE — ADHS SKIN DERMABOND TOP ADVANCED